# Patient Record
Sex: MALE | Race: WHITE | NOT HISPANIC OR LATINO | Employment: OTHER | URBAN - METROPOLITAN AREA
[De-identification: names, ages, dates, MRNs, and addresses within clinical notes are randomized per-mention and may not be internally consistent; named-entity substitution may affect disease eponyms.]

---

## 2018-04-21 ENCOUNTER — APPOINTMENT (OUTPATIENT)
Dept: CT IMAGING | Facility: HOSPITAL | Age: 43
End: 2018-04-21

## 2018-04-21 ENCOUNTER — HOSPITAL ENCOUNTER (EMERGENCY)
Facility: HOSPITAL | Age: 43
Discharge: HOME | End: 2018-04-21
Attending: EMERGENCY MEDICINE | Admitting: EMERGENCY MEDICINE

## 2018-04-21 VITALS
BODY MASS INDEX: 42.95 KG/M2 | HEIGHT: 69 IN | SYSTOLIC BLOOD PRESSURE: 134 MMHG | HEART RATE: 93 BPM | WEIGHT: 290 LBS | DIASTOLIC BLOOD PRESSURE: 86 MMHG | OXYGEN SATURATION: 92 % | TEMPERATURE: 98.2 F | RESPIRATION RATE: 14 BRPM

## 2018-04-21 DIAGNOSIS — N23 RENAL COLIC ON RIGHT SIDE: Primary | ICD-10-CM

## 2018-04-21 DIAGNOSIS — N20.1 URETEROLITHIASIS: ICD-10-CM

## 2018-04-21 LAB
ALBUMIN SERPL-MCNC: 4.2 G/DL (ref 3.2–4.8)
ALBUMIN/GLOB SERPL: 1.3 G/DL (ref 1.5–2.5)
ALP SERPL-CCNC: 80 U/L (ref 25–100)
ALT SERPL W P-5'-P-CCNC: 58 U/L (ref 7–40)
ANION GAP SERPL CALCULATED.3IONS-SCNC: 7 MMOL/L (ref 3–11)
AST SERPL-CCNC: 37 U/L (ref 0–33)
BACTERIA UR QL AUTO: ABNORMAL /HPF
BASOPHILS # BLD AUTO: 0.05 10*3/MM3 (ref 0–0.2)
BASOPHILS NFR BLD AUTO: 0.4 % (ref 0–1)
BILIRUB SERPL-MCNC: 0.5 MG/DL (ref 0.3–1.2)
BILIRUB UR QL STRIP: NEGATIVE
BUN BLD-MCNC: 11 MG/DL (ref 9–23)
BUN/CREAT SERPL: 12.2 (ref 7–25)
CALCIUM SPEC-SCNC: 9.2 MG/DL (ref 8.7–10.4)
CHLORIDE SERPL-SCNC: 103 MMOL/L (ref 99–109)
CLARITY UR: ABNORMAL
CO2 SERPL-SCNC: 29 MMOL/L (ref 20–31)
COLOR UR: YELLOW
CREAT BLD-MCNC: 0.9 MG/DL (ref 0.6–1.3)
DEPRECATED RDW RBC AUTO: 42.9 FL (ref 37–54)
EOSINOPHIL # BLD AUTO: 0.24 10*3/MM3 (ref 0–0.3)
EOSINOPHIL NFR BLD AUTO: 1.8 % (ref 0–3)
ERYTHROCYTE [DISTWIDTH] IN BLOOD BY AUTOMATED COUNT: 13.9 % (ref 11.3–14.5)
GFR SERPL CREATININE-BSD FRML MDRD: 91 ML/MIN/1.73
GLOBULIN UR ELPH-MCNC: 3.3 GM/DL
GLUCOSE BLD-MCNC: 123 MG/DL (ref 70–100)
GLUCOSE UR STRIP-MCNC: NEGATIVE MG/DL
HCT VFR BLD AUTO: 43.8 % (ref 38.9–50.9)
HGB BLD-MCNC: 14.2 G/DL (ref 13.1–17.5)
HGB UR QL STRIP.AUTO: ABNORMAL
HOLD SPECIMEN: NORMAL
HOLD SPECIMEN: NORMAL
HYALINE CASTS UR QL AUTO: ABNORMAL /LPF
IMM GRANULOCYTES # BLD: 0.04 10*3/MM3 (ref 0–0.03)
IMM GRANULOCYTES NFR BLD: 0.3 % (ref 0–0.6)
KETONES UR QL STRIP: NEGATIVE
LEUKOCYTE ESTERASE UR QL STRIP.AUTO: NEGATIVE
LIPASE SERPL-CCNC: 35 U/L (ref 6–51)
LYMPHOCYTES # BLD AUTO: 2.3 10*3/MM3 (ref 0.6–4.8)
LYMPHOCYTES NFR BLD AUTO: 17.5 % (ref 24–44)
MCH RBC QN AUTO: 27.7 PG (ref 27–31)
MCHC RBC AUTO-ENTMCNC: 32.4 G/DL (ref 32–36)
MCV RBC AUTO: 85.5 FL (ref 80–99)
MONOCYTES # BLD AUTO: 0.59 10*3/MM3 (ref 0–1)
MONOCYTES NFR BLD AUTO: 4.5 % (ref 0–12)
MUCOUS THREADS URNS QL MICRO: ABNORMAL /HPF
NEUTROPHILS # BLD AUTO: 9.93 10*3/MM3 (ref 1.5–8.3)
NEUTROPHILS NFR BLD AUTO: 75.8 % (ref 41–71)
NITRITE UR QL STRIP: NEGATIVE
PH UR STRIP.AUTO: <=5 [PH] (ref 5–8)
PLATELET # BLD AUTO: 273 10*3/MM3 (ref 150–450)
PMV BLD AUTO: 9.3 FL (ref 6–12)
POTASSIUM BLD-SCNC: 4 MMOL/L (ref 3.5–5.5)
PROT SERPL-MCNC: 7.5 G/DL (ref 5.7–8.2)
PROT UR QL STRIP: NEGATIVE
RBC # BLD AUTO: 5.12 10*6/MM3 (ref 4.2–5.76)
RBC # UR: ABNORMAL /HPF
REF LAB TEST METHOD: ABNORMAL
SODIUM BLD-SCNC: 139 MMOL/L (ref 132–146)
SP GR UR STRIP: 1.02 (ref 1–1.03)
SQUAMOUS #/AREA URNS HPF: ABNORMAL /HPF
UROBILINOGEN UR QL STRIP: ABNORMAL
WBC NRBC COR # BLD: 13.11 10*3/MM3 (ref 3.5–10.8)
WBC UR QL AUTO: ABNORMAL /HPF
WHOLE BLOOD HOLD SPECIMEN: NORMAL
WHOLE BLOOD HOLD SPECIMEN: NORMAL

## 2018-04-21 PROCEDURE — 80053 COMPREHEN METABOLIC PANEL: CPT | Performed by: EMERGENCY MEDICINE

## 2018-04-21 PROCEDURE — 36415 COLL VENOUS BLD VENIPUNCTURE: CPT

## 2018-04-21 PROCEDURE — 96374 THER/PROPH/DIAG INJ IV PUSH: CPT

## 2018-04-21 PROCEDURE — 74176 CT ABD & PELVIS W/O CONTRAST: CPT

## 2018-04-21 PROCEDURE — 99284 EMERGENCY DEPT VISIT MOD MDM: CPT

## 2018-04-21 PROCEDURE — 81001 URINALYSIS AUTO W/SCOPE: CPT | Performed by: EMERGENCY MEDICINE

## 2018-04-21 PROCEDURE — 25010000002 KETOROLAC TROMETHAMINE PER 15 MG: Performed by: EMERGENCY MEDICINE

## 2018-04-21 PROCEDURE — 85025 COMPLETE CBC W/AUTO DIFF WBC: CPT | Performed by: EMERGENCY MEDICINE

## 2018-04-21 PROCEDURE — 83690 ASSAY OF LIPASE: CPT | Performed by: EMERGENCY MEDICINE

## 2018-04-21 RX ORDER — KETOROLAC TROMETHAMINE 10 MG/1
10 TABLET, FILM COATED ORAL EVERY 6 HOURS PRN
Qty: 16 TABLET | Refills: 0 | OUTPATIENT
Start: 2018-04-21 | End: 2019-11-03

## 2018-04-21 RX ORDER — LISINOPRIL 20 MG/1
10 TABLET ORAL DAILY
COMMUNITY
End: 2019-03-23 | Stop reason: SDUPTHER

## 2018-04-21 RX ORDER — SODIUM CHLORIDE 0.9 % (FLUSH) 0.9 %
10 SYRINGE (ML) INJECTION AS NEEDED
Status: DISCONTINUED | OUTPATIENT
Start: 2018-04-21 | End: 2018-04-21 | Stop reason: HOSPADM

## 2018-04-21 RX ORDER — KETOROLAC TROMETHAMINE 15 MG/ML
15 INJECTION, SOLUTION INTRAMUSCULAR; INTRAVENOUS ONCE
Status: COMPLETED | OUTPATIENT
Start: 2018-04-21 | End: 2018-04-21

## 2018-04-21 RX ORDER — TAMSULOSIN HYDROCHLORIDE 0.4 MG/1
1 CAPSULE ORAL NIGHTLY
Qty: 15 CAPSULE | Refills: 0 | Status: SHIPPED | OUTPATIENT
Start: 2018-04-21 | End: 2019-03-23 | Stop reason: SDUPTHER

## 2018-04-21 RX ORDER — OXYCODONE AND ACETAMINOPHEN 7.5; 325 MG/1; MG/1
1 TABLET ORAL EVERY 4 HOURS PRN
Qty: 12 TABLET | Refills: 0 | OUTPATIENT
Start: 2018-04-21 | End: 2019-11-03

## 2018-04-21 RX ORDER — OMEPRAZOLE 20 MG/1
20 CAPSULE, DELAYED RELEASE ORAL DAILY
COMMUNITY
End: 2019-04-11 | Stop reason: SDUPTHER

## 2018-04-21 RX ADMIN — KETOROLAC TROMETHAMINE 15 MG: 15 INJECTION, SOLUTION INTRAMUSCULAR; INTRAVENOUS at 14:06

## 2018-04-21 RX ADMIN — SODIUM CHLORIDE 1000 ML: 9 INJECTION, SOLUTION INTRAVENOUS at 14:07

## 2018-04-21 NOTE — ED PROVIDER NOTES
Subjective   Mr. Sumanth Alfaro is a 43 y.o. male who presents to the ED by EMS with c/o abdominal pain onset approximately 45 minutes ago. Pt reports at initial onset he had sudden R flank pain that quickly radiates to suprapubic region. He states he has hx of kidney stones and experienced similar sx at last kidney stone episode, however current sx are more severe. He also complains of nausea but denies vomiting, testicular pain, penile pain, and any other acute sx at this time. He was given 4 mg of Zofran en route, which did not provide relief. Pt has surgical hx of Cholecystomy.                   History provided by:  Patient  Abdominal Pain   Pain location:  R flank  Pain radiates to:  Suprapubic region  Pain severity:  Severe (9/10)  Onset quality:  Sudden  Duration:  1 hour  Timing:  Constant  Progression:  Unchanged  Chronicity:  New  Relieved by:  Nothing  Worsened by:  Nothing  Ineffective treatments: Zofran.  Associated symptoms: nausea    Associated symptoms: no vomiting        Review of Systems   Gastrointestinal: Positive for abdominal pain (suprapubic region) and nausea. Negative for vomiting.   Genitourinary: Positive for flank pain (R flank). Negative for penile pain and testicular pain.   All other systems reviewed and are negative.      Past Medical History:   Diagnosis Date   • GERD (gastroesophageal reflux disease)    • Hypertension    • Kidney stone        Allergies   Allergen Reactions   • Sulfa Antibiotics Rash       Past Surgical History:   Procedure Laterality Date   • CARPAL TUNNEL RELEASE     • CHOLECYSTECTOMY         History reviewed. No pertinent family history.    Social History     Social History   • Marital status:      Social History Main Topics   • Smoking status: Never Smoker   • Alcohol use No   • Drug use: No   • Sexual activity: Defer     Other Topics Concern   • Not on file         Objective   Physical Exam   Constitutional: He is oriented to person, place, and time.  He appears well-developed and well-nourished. He appears distressed.   Pt appears in moderate distress secondary to abd pain.     No cva tenderness      HENT:   Head: Normocephalic and atraumatic.   Right Ear: External ear normal.   Left Ear: External ear normal.   Eyes: Conjunctivae are normal.   Neck: Normal range of motion.   Cardiovascular: Normal rate, regular rhythm and normal heart sounds.  Exam reveals no gallop and no friction rub.    No murmur heard.  Pulmonary/Chest: Effort normal and breath sounds normal. He has no wheezes. He has no rhonchi. He has no rales.   Abdominal: Soft. Bowel sounds are normal. There is no tenderness. There is no CVA tenderness.   Protuberantly adipose.    Musculoskeletal: Normal range of motion.   Neurological: He is alert and oriented to person, place, and time.   Skin: Skin is warm and dry. He is not diaphoretic.   Psychiatric: He has a normal mood and affect. His behavior is normal. Judgment and thought content normal.   Nursing note and vitals reviewed.      Procedures         ED Course  ED Course   Comment By Time   Patient much improved after Toradol and IV fluids.  Awaiting CT scan. Jose Fraire MD 04/21 4388       No results found for this or any previous visit (from the past 24 hour(s)).  Note: In addition to lab results from this visit, the labs listed above may include labs taken at another facility or during a different encounter within the last 24 hours. Please correlate lab times with ED admission and discharge times for further clarification of the services performed during this visit.    CT Abdomen Pelvis Without Contrast   Final Result   1. There is some stranding identified surrounding the right ureter which   is mildly dilated. Question of a 1 to 2 mm right distal ureteral stone.   Mild dilatation identified of the right renal collecting system with no   perinephric stranding.   2. Ventral abdominal wall hernias are identified: one is  umbilical and   the  other is periumbilical with both containing mesenteric fat.       DICTATED:     04/21/2018   EDITED/ls :     04/21/2018            This report was finalized on 4/21/2018 6:38 PM by Dr. Nani Mario MD.            Vitals:    04/21/18 1400 04/21/18 1438 04/21/18 1443 04/21/18 1653   BP:   138/80 134/86   BP Location:       Patient Position:       Pulse:    93   Resp:    14   Temp: 97.9 °F (36.6 °C)   98.2 °F (36.8 °C)   TempSrc: Oral   Oral   SpO2:  98% 92%    Weight:       Height:         Medications   sodium chloride 0.9 % bolus 1,000 mL (0 mL Intravenous Stopped 4/21/18 1654)   ketorolac (TORADOL) injection 15 mg (15 mg Intravenous Given 4/21/18 1406)     ECG/EMG Results (last 24 hours)     ** No results found for the last 24 hours. **                      [facility]    Final diagnoses:   Renal colic on right side   Ureterolithiasis       Documentation assistance provided by santos Joshi.  Information recorded by the scribe was done at my direction and has been verified and validated by me.     Finesse Joshi  04/21/18 5161       Jose Fraire MD  04/23/18 2407

## 2018-04-21 NOTE — DISCHARGE INSTRUCTIONS
If unable to fill the prescription for Toradol, please utilize ibuprofen 3-4 over-the-counter tablets every 8 hours as needed for discomfort.    For worsened pain you may use the oxycodone.  No driving within 12 hours of taking oxycodone.    Push fluids.  Strain all urine.    If the stone does not pass I suggest following up with the urologist.  You may follow-up with one per Dr. Carl's preference or with our on-call urologist, Dr. Kamara.    Return if worse or if fevers develop.    If you become quite lightheaded in the upright position please discontinue Flomax.    Please review the medications you are supposed to be taking according to prior physician recommendations. I have not changed your home medications during this visit. If your discharge instructions indicate that I have changed your home medications, this is not the case, and you should disregard. If you have any questions about the medication you should be taking at home, please call your physician.

## 2019-01-31 ENCOUNTER — OFFICE (OUTPATIENT)
Dept: URBAN - METROPOLITAN AREA PATHOLOGY 4 | Facility: PATHOLOGY | Age: 44
End: 2019-01-31

## 2019-01-31 ENCOUNTER — AMBULATORY SURGICAL CENTER (OUTPATIENT)
Dept: URBAN - METROPOLITAN AREA SURGERY 10 | Facility: SURGERY | Age: 44
End: 2019-01-31

## 2019-01-31 DIAGNOSIS — K64.1 SECOND DEGREE HEMORRHOIDS: ICD-10-CM

## 2019-01-31 DIAGNOSIS — D12.2 BENIGN NEOPLASM OF ASCENDING COLON: ICD-10-CM

## 2019-01-31 DIAGNOSIS — R10.30 LOWER ABDOMINAL PAIN, UNSPECIFIED: ICD-10-CM

## 2019-01-31 DIAGNOSIS — K62.5 HEMORRHAGE OF ANUS AND RECTUM: ICD-10-CM

## 2019-01-31 PROCEDURE — 45385 COLONOSCOPY W/LESION REMOVAL: CPT | Performed by: INTERNAL MEDICINE

## 2019-01-31 PROCEDURE — 45398 COLONOSCOPY W/BAND LIGATION: CPT | Performed by: INTERNAL MEDICINE

## 2019-01-31 PROCEDURE — 88305 TISSUE EXAM BY PATHOLOGIST: CPT | Performed by: INTERNAL MEDICINE

## 2019-03-07 RX ORDER — VENLAFAXINE HYDROCHLORIDE 75 MG/1
CAPSULE, EXTENDED RELEASE ORAL
Qty: 90 CAPSULE | Refills: 3 | Status: SHIPPED | OUTPATIENT
Start: 2019-03-07 | End: 2020-03-05

## 2019-03-25 RX ORDER — LISINOPRIL 20 MG/1
TABLET ORAL
Qty: 90 TABLET | Refills: 1 | Status: SHIPPED | OUTPATIENT
Start: 2019-03-25 | End: 2019-09-24 | Stop reason: SDUPTHER

## 2019-03-25 RX ORDER — TAMSULOSIN HYDROCHLORIDE 0.4 MG/1
CAPSULE ORAL
Qty: 90 CAPSULE | Refills: 1 | Status: SHIPPED | OUTPATIENT
Start: 2019-03-25 | End: 2019-09-24 | Stop reason: SDUPTHER

## 2019-04-12 RX ORDER — OMEPRAZOLE 20 MG/1
CAPSULE, DELAYED RELEASE ORAL
Qty: 90 CAPSULE | Refills: 1 | Status: SHIPPED | OUTPATIENT
Start: 2019-04-12 | End: 2019-10-14 | Stop reason: SDUPTHER

## 2019-04-12 NOTE — TELEPHONE ENCOUNTER
IN [facility] WE HAVE THIS MEDICATION OMEPRAZOLE 20 1 QD INACTIVE AS OF 10/26/18  AND HAVE [id number] MG 1 BID   LISTED IN PT'S ACTIVE MEDS ON 11/30/18    IN [facility] OMEPRAZOLE  WAS PRESCRIBED BY YANNI VENTURA ON 4/21/18        PLEASE ADVISE

## 2019-09-24 RX ORDER — LISINOPRIL 20 MG/1
TABLET ORAL
Qty: 30 TABLET | Refills: 1 | Status: SHIPPED | OUTPATIENT
Start: 2019-09-24 | End: 2019-11-22 | Stop reason: SDUPTHER

## 2019-09-24 RX ORDER — TAMSULOSIN HYDROCHLORIDE 0.4 MG/1
CAPSULE ORAL
Qty: 30 CAPSULE | Refills: 1 | Status: SHIPPED | OUTPATIENT
Start: 2019-09-24 | End: 2019-11-22 | Stop reason: SDUPTHER

## 2019-09-26 ENCOUNTER — TELEPHONE (OUTPATIENT)
Dept: INTERNAL MEDICINE | Facility: CLINIC | Age: 44
End: 2019-09-26

## 2019-09-26 NOTE — TELEPHONE ENCOUNTER
GLORIA FROM [facility]  CALLED STATING SHE NEEDS LAST OV NOTE AND LAST EKG IF WE HAVE IT FAXED TO HER AT  [phone]

## 2019-10-14 RX ORDER — OMEPRAZOLE 20 MG/1
CAPSULE, DELAYED RELEASE ORAL
Qty: 90 CAPSULE | Refills: 0 | Status: SHIPPED | OUTPATIENT
Start: 2019-10-14 | End: 2019-11-22 | Stop reason: SDUPTHER

## 2019-10-25 ENCOUNTER — TELEPHONE (OUTPATIENT)
Dept: FAMILY MEDICINE CLINIC | Facility: CLINIC | Age: 44
End: 2019-10-25

## 2019-10-25 RX ORDER — TAMSULOSIN HYDROCHLORIDE 0.4 MG/1
1 CAPSULE ORAL DAILY
Qty: 30 CAPSULE | Refills: 1 | Status: CANCELLED | OUTPATIENT
Start: 2019-10-25

## 2019-10-25 RX ORDER — LISINOPRIL 20 MG/1
20 TABLET ORAL DAILY
Qty: 30 TABLET | Refills: 1 | Status: CANCELLED | OUTPATIENT
Start: 2019-10-25

## 2019-11-03 ENCOUNTER — APPOINTMENT (OUTPATIENT)
Dept: CT IMAGING | Facility: HOSPITAL | Age: 44
End: 2019-11-03

## 2019-11-03 ENCOUNTER — HOSPITAL ENCOUNTER (EMERGENCY)
Facility: HOSPITAL | Age: 44
Discharge: HOME | End: 2019-11-03
Attending: EMERGENCY MEDICINE | Admitting: EMERGENCY MEDICINE

## 2019-11-03 VITALS
HEART RATE: 75 BPM | WEIGHT: 280 LBS | RESPIRATION RATE: 18 BRPM | SYSTOLIC BLOOD PRESSURE: 120 MMHG | BODY MASS INDEX: 41.47 KG/M2 | TEMPERATURE: 98.5 F | HEIGHT: 69 IN | OXYGEN SATURATION: 88 % | DIASTOLIC BLOOD PRESSURE: 84 MMHG

## 2019-11-03 DIAGNOSIS — N20.1 RIGHT URETERAL STONE: Primary | ICD-10-CM

## 2019-11-03 DIAGNOSIS — R10.9 ACUTE ABDOMINAL PAIN: ICD-10-CM

## 2019-11-03 DIAGNOSIS — N20.0 LEFT RENAL STONE: ICD-10-CM

## 2019-11-03 LAB
ALBUMIN SERPL-MCNC: 3.7 G/DL (ref 3.5–5.2)
ALBUMIN/GLOB SERPL: 1 G/DL
ALP SERPL-CCNC: 68 U/L (ref 39–117)
ALT SERPL W P-5'-P-CCNC: 48 U/L (ref 1–41)
ANION GAP SERPL CALCULATED.3IONS-SCNC: 9 MMOL/L (ref 5–15)
AST SERPL-CCNC: 32 U/L (ref 1–40)
BASOPHILS # BLD AUTO: 0.09 10*3/MM3 (ref 0–0.2)
BASOPHILS NFR BLD AUTO: 1 % (ref 0–1.5)
BILIRUB SERPL-MCNC: 0.4 MG/DL (ref 0.2–1.2)
BILIRUB UR QL STRIP: NEGATIVE
BUN BLD-MCNC: 11 MG/DL (ref 6–20)
BUN/CREAT SERPL: 11.1 (ref 7–25)
CALCIUM SPEC-SCNC: 9.2 MG/DL (ref 8.6–10.5)
CHLORIDE SERPL-SCNC: 102 MMOL/L (ref 98–107)
CLARITY UR: CLEAR
CO2 SERPL-SCNC: 28 MMOL/L (ref 22–29)
COLOR UR: YELLOW
CREAT BLD-MCNC: 0.99 MG/DL (ref 0.76–1.27)
DEPRECATED RDW RBC AUTO: 44.7 FL (ref 37–54)
EOSINOPHIL # BLD AUTO: 0.32 10*3/MM3 (ref 0–0.4)
EOSINOPHIL NFR BLD AUTO: 3.5 % (ref 0.3–6.2)
ERYTHROCYTE [DISTWIDTH] IN BLOOD BY AUTOMATED COUNT: 14 % (ref 12.3–15.4)
GFR SERPL CREATININE-BSD FRML MDRD: 81 ML/MIN/1.73
GLOBULIN UR ELPH-MCNC: 3.6 GM/DL
GLUCOSE BLD-MCNC: 96 MG/DL (ref 65–99)
GLUCOSE UR STRIP-MCNC: NEGATIVE MG/DL
HCT VFR BLD AUTO: 42.4 % (ref 37.5–51)
HGB BLD-MCNC: 13.3 G/DL (ref 13–17.7)
HGB UR QL STRIP.AUTO: NEGATIVE
HOLD SPECIMEN: NORMAL
HOLD SPECIMEN: NORMAL
IMM GRANULOCYTES # BLD AUTO: 0.03 10*3/MM3 (ref 0–0.05)
IMM GRANULOCYTES NFR BLD AUTO: 0.3 % (ref 0–0.5)
KETONES UR QL STRIP: NEGATIVE
LEUKOCYTE ESTERASE UR QL STRIP.AUTO: NEGATIVE
LIPASE SERPL-CCNC: 67 U/L (ref 13–60)
LYMPHOCYTES # BLD AUTO: 3.76 10*3/MM3 (ref 0.7–3.1)
LYMPHOCYTES NFR BLD AUTO: 41.6 % (ref 19.6–45.3)
MCH RBC QN AUTO: 27.3 PG (ref 26.6–33)
MCHC RBC AUTO-ENTMCNC: 31.4 G/DL (ref 31.5–35.7)
MCV RBC AUTO: 86.9 FL (ref 79–97)
MONOCYTES # BLD AUTO: 0.68 10*3/MM3 (ref 0.1–0.9)
MONOCYTES NFR BLD AUTO: 7.5 % (ref 5–12)
NEUTROPHILS # BLD AUTO: 4.16 10*3/MM3 (ref 1.7–7)
NEUTROPHILS NFR BLD AUTO: 46.1 % (ref 42.7–76)
NITRITE UR QL STRIP: NEGATIVE
NRBC BLD AUTO-RTO: 0 /100 WBC (ref 0–0.2)
PH UR STRIP.AUTO: <=5 [PH] (ref 5–8)
PLATELET # BLD AUTO: 310 10*3/MM3 (ref 140–450)
PMV BLD AUTO: 8.8 FL (ref 6–12)
POTASSIUM BLD-SCNC: 4.1 MMOL/L (ref 3.5–5.2)
PROT SERPL-MCNC: 7.3 G/DL (ref 6–8.5)
PROT UR QL STRIP: NEGATIVE
RBC # BLD AUTO: 4.88 10*6/MM3 (ref 4.14–5.8)
SODIUM BLD-SCNC: 139 MMOL/L (ref 136–145)
SP GR UR STRIP: 1.03 (ref 1–1.03)
UROBILINOGEN UR QL STRIP: NORMAL
WBC NRBC COR # BLD: 9.04 10*3/MM3 (ref 3.4–10.8)
WHOLE BLOOD HOLD SPECIMEN: NORMAL
WHOLE BLOOD HOLD SPECIMEN: NORMAL

## 2019-11-03 PROCEDURE — 25010000002 KETOROLAC TROMETHAMINE PER 15 MG: Performed by: EMERGENCY MEDICINE

## 2019-11-03 PROCEDURE — 80053 COMPREHEN METABOLIC PANEL: CPT | Performed by: EMERGENCY MEDICINE

## 2019-11-03 PROCEDURE — 25010000002 HYDROMORPHONE 1 MG/ML SOLUTION: Performed by: EMERGENCY MEDICINE

## 2019-11-03 PROCEDURE — 96375 TX/PRO/DX INJ NEW DRUG ADDON: CPT

## 2019-11-03 PROCEDURE — 74176 CT ABD & PELVIS W/O CONTRAST: CPT

## 2019-11-03 PROCEDURE — 99283 EMERGENCY DEPT VISIT LOW MDM: CPT

## 2019-11-03 PROCEDURE — 85025 COMPLETE CBC W/AUTO DIFF WBC: CPT | Performed by: EMERGENCY MEDICINE

## 2019-11-03 PROCEDURE — 96374 THER/PROPH/DIAG INJ IV PUSH: CPT

## 2019-11-03 PROCEDURE — 81003 URINALYSIS AUTO W/O SCOPE: CPT | Performed by: EMERGENCY MEDICINE

## 2019-11-03 PROCEDURE — 25010000002 ONDANSETRON PER 1 MG: Performed by: EMERGENCY MEDICINE

## 2019-11-03 PROCEDURE — 83690 ASSAY OF LIPASE: CPT | Performed by: EMERGENCY MEDICINE

## 2019-11-03 RX ORDER — KETOROLAC TROMETHAMINE 15 MG/ML
15 INJECTION, SOLUTION INTRAMUSCULAR; INTRAVENOUS ONCE
Status: COMPLETED | OUTPATIENT
Start: 2019-11-03 | End: 2019-11-03

## 2019-11-03 RX ORDER — ONDANSETRON 2 MG/ML
4 INJECTION INTRAMUSCULAR; INTRAVENOUS ONCE
Status: COMPLETED | OUTPATIENT
Start: 2019-11-03 | End: 2019-11-03

## 2019-11-03 RX ORDER — SODIUM CHLORIDE 0.9 % (FLUSH) 0.9 %
10 SYRINGE (ML) INJECTION AS NEEDED
Status: DISCONTINUED | OUTPATIENT
Start: 2019-11-03 | End: 2019-11-03 | Stop reason: HOSPADM

## 2019-11-03 RX ORDER — ONDANSETRON 4 MG/1
4 TABLET, ORALLY DISINTEGRATING ORAL EVERY 8 HOURS PRN
Qty: 6 TABLET | Refills: 0 | Status: SHIPPED | OUTPATIENT
Start: 2019-11-03 | End: 2019-11-11

## 2019-11-03 RX ORDER — HYDROMORPHONE HYDROCHLORIDE 2 MG/1
2 TABLET ORAL EVERY 4 HOURS PRN
Qty: 12 TABLET | Refills: 0 | Status: SHIPPED | OUTPATIENT
Start: 2019-11-03 | End: 2019-11-11

## 2019-11-03 RX ADMIN — HYDROMORPHONE HYDROCHLORIDE 1 MG: 1 INJECTION, SOLUTION INTRAMUSCULAR; INTRAVENOUS; SUBCUTANEOUS at 09:55

## 2019-11-03 RX ADMIN — ONDANSETRON 4 MG: 2 INJECTION INTRAMUSCULAR; INTRAVENOUS at 09:54

## 2019-11-03 RX ADMIN — SODIUM CHLORIDE 1000 ML: 9 INJECTION, SOLUTION INTRAVENOUS at 09:53

## 2019-11-03 RX ADMIN — KETOROLAC TROMETHAMINE 15 MG: 15 INJECTION, SOLUTION INTRAMUSCULAR; INTRAVENOUS at 11:17

## 2019-11-03 NOTE — ED PROVIDER NOTES
Subjective   Sumanth Alfaro is a 44 y.o. male who presents to the ED with c/o abdominal pain. Mr. Alfaro states his sharp, intermittent right lower quadrant abdominal pain began 2 days ago and it radiates into his right groin. When the pain comes on it normally lasts for a few hours and there is nothing that exacerbates or alleviates the pain. Mr. Alfaro reports he has a history of kidney stones and this current episodes feels similar to the past one. He complains of nausea, decreased appetite during the onset of his pain, and difficulty urinating yesterday but denies vomiting, fever, dysuria, urgency, and hematuria. Mr. Alfaro reports he has not had a bowel movement within the past few days. He has a past medical history of GERD and his surgical history includes cholecystectomy. There are no other acute complaints at this time.        History provided by:  Patient  Abdominal Pain   Pain location:  RLQ  Pain quality: sharp    Pain radiates to:  Groin  Pain severity:  Moderate  Onset quality:  Sudden  Duration:  2 days  Timing:  Intermittent  Progression:  Worsening  Chronicity:  Recurrent  Relieved by:  None tried  Worsened by:  Nothing  Ineffective treatments:  None tried  Associated symptoms: constipation (no bowel movement over the past few days) and nausea    Associated symptoms: no anorexia, no chills, no diarrhea, no dysuria, no fever, no hematuria, no vaginal bleeding, no vaginal discharge and no vomiting    Risk factors: no alcohol abuse, not elderly and not pregnant        Review of Systems   Constitutional: Positive for appetite change (decreased when pain comes on). Negative for chills and fever.   Gastrointestinal: Positive for abdominal pain, constipation (no bowel movement over the past few days) and nausea. Negative for anorexia, blood in stool, diarrhea and vomiting.   Genitourinary: Positive for difficulty urinating (yesterday). Negative for decreased urine volume, dysuria,  frequency, hematuria, urgency, vaginal bleeding and vaginal discharge.   Musculoskeletal:        Groin pain that radiates from the right lower quadrant.   All other systems reviewed and are negative.      Past Medical History:   Diagnosis Date   • GERD (gastroesophageal reflux disease)    • Hypertension    • Kidney stone        Allergies   Allergen Reactions   • Sulfa Antibiotics Rash       Past Surgical History:   Procedure Laterality Date   • CARPAL TUNNEL RELEASE     • CHOLECYSTECTOMY         No family history on file.    Social History     Socioeconomic History   • Marital status:      Spouse name: Not on file   • Number of children: Not on file   • Years of education: Not on file   • Highest education level: Not on file   Tobacco Use   • Smoking status: Never Smoker   Substance and Sexual Activity   • Alcohol use: No   • Drug use: No   • Sexual activity: Defer         Objective   Physical Exam   Constitutional: He is oriented to person, place, and time. He appears well-developed and well-nourished. No distress.   HENT:   Head: Normocephalic and atraumatic.   Eyes: Conjunctivae are normal. No scleral icterus.   Neck: Normal range of motion. Neck supple.   Cardiovascular: Normal rate, regular rhythm and normal heart sounds.   No murmur heard.  Pulmonary/Chest: Effort normal and breath sounds normal. No respiratory distress. He has no wheezes. He has no rhonchi. He has no rales.   Abdominal: Soft. There is tenderness in the right lower quadrant.   Musculoskeletal: Normal range of motion. He exhibits no edema.   Neurological: He is alert and oriented to person, place, and time.   Skin: Skin is warm and dry.   Psychiatric: He has a normal mood and affect. His behavior is normal.   Nursing note and vitals reviewed.      Procedures         ED Course  ED Course as of Nov 03 1332   Sun Nov 03, 2019   1114 Dr. Sims is bedside re-evaluating Mr. Alfaro and updating him on the results of the studies.  [BS]    1122 I spoke with Mr. Alfaro and his wife about CT findings.  He tells me he feels better.  I discussed indicators for return and use of narcotic pain medicines, I gave him a strainer.  [DT]   1130 Dr. Sims is bedside discussing the plan for discharge with Mr. Alfaro.  [BS]      ED Course User Index  [BS] Sammy Erickson  [DT] Anatoly Sims MD                     MDM  Number of Diagnoses or Management Options  Acute abdominal pain: new and requires workup  Left renal stone:   Right ureteral stone: new and requires workup     Amount and/or Complexity of Data Reviewed  Clinical lab tests: reviewed and ordered  Tests in the radiology section of CPT®: ordered and reviewed  Review and summarize past medical records: yes    Patient Progress  Patient progress: improved      Final diagnoses:   Right ureteral stone   Left renal stone   Acute abdominal pain       Documentation assistance provided by santos Erickson.  Information recorded by the scribe was done at my direction and has been verified and validated by me.     Sammy Erickson  11/03/19 0951       Anatoly Sims MD  11/03/19 4520

## 2019-11-03 NOTE — DISCHARGE INSTRUCTIONS
Drink plenty of fluids.  Return if your pain is not adequately controlled, if you have fever, if you are vomiting and cannot hold down your medications, or if you have any other concerns.  Do not drive while taking the pain medicine I have prescribed.

## 2019-11-08 PROBLEM — F32.A DEPRESSION: Status: ACTIVE | Noted: 2019-11-08

## 2019-11-08 PROBLEM — N40.1 BENIGN PROSTATIC HYPERPLASIA (BPH) WITH URINARY URGENCY: Status: ACTIVE | Noted: 2019-11-08

## 2019-11-08 PROBLEM — J30.9 ALLERGIC RHINITIS: Status: ACTIVE | Noted: 2019-11-08

## 2019-11-08 PROBLEM — N20.0 RENAL STONE: Status: ACTIVE | Noted: 2019-11-08

## 2019-11-08 PROBLEM — J01.90 ACUTE BACTERIAL RHINOSINUSITIS: Status: ACTIVE | Noted: 2019-11-08

## 2019-11-08 PROBLEM — E78.5 HYPERLIPIDEMIA LDL GOAL <100: Status: ACTIVE | Noted: 2019-11-08

## 2019-11-08 PROBLEM — K21.00 ESOPHAGITIS, REFLUX: Status: ACTIVE | Noted: 2019-11-08

## 2019-11-08 PROBLEM — B96.89 ACUTE BACTERIAL RHINOSINUSITIS: Status: ACTIVE | Noted: 2019-11-08

## 2019-11-08 PROBLEM — I10 HYPERTENSION, BENIGN: Status: ACTIVE | Noted: 2019-11-08

## 2019-11-08 PROBLEM — R39.15 BENIGN PROSTATIC HYPERPLASIA (BPH) WITH URINARY URGENCY: Status: ACTIVE | Noted: 2019-11-08

## 2019-11-08 PROBLEM — R73.01 IMPAIRED FASTING GLUCOSE: Status: ACTIVE | Noted: 2019-11-08

## 2019-11-08 PROBLEM — R30.0 DYSURIA: Status: ACTIVE | Noted: 2019-11-08

## 2019-11-08 PROBLEM — E66.01 MORBID OBESITY DUE TO EXCESS CALORIES (HCC): Status: ACTIVE | Noted: 2019-11-08

## 2019-11-08 PROBLEM — M54.50 PAIN IN LOWER BACK: Status: ACTIVE | Noted: 2019-11-08

## 2019-11-11 ENCOUNTER — OFFICE VISIT (OUTPATIENT)
Dept: INTERNAL MEDICINE | Facility: CLINIC | Age: 44
End: 2019-11-11

## 2019-11-11 VITALS
TEMPERATURE: 98.2 F | HEIGHT: 69 IN | HEART RATE: 100 BPM | BODY MASS INDEX: 43.84 KG/M2 | DIASTOLIC BLOOD PRESSURE: 64 MMHG | WEIGHT: 296 LBS | SYSTOLIC BLOOD PRESSURE: 118 MMHG

## 2019-11-11 DIAGNOSIS — N20.0 KIDNEY STONES: ICD-10-CM

## 2019-11-11 DIAGNOSIS — R35.0 URINARY FREQUENCY: Primary | ICD-10-CM

## 2019-11-11 LAB
BILIRUB BLD-MCNC: NEGATIVE MG/DL
CLARITY, POC: CLEAR
COLOR UR: ABNORMAL
GLUCOSE UR STRIP-MCNC: NEGATIVE MG/DL
KETONES UR QL: NEGATIVE
LEUKOCYTE EST, POC: NEGATIVE
NITRITE UR-MCNC: NEGATIVE MG/ML
PH UR: 6 [PH] (ref 5–8)
PROT UR STRIP-MCNC: NEGATIVE MG/DL
RBC # UR STRIP: NEGATIVE /UL
SP GR UR: 1.02 (ref 1–1.03)
UROBILINOGEN UR QL: NORMAL

## 2019-11-11 PROCEDURE — 99213 OFFICE O/P EST LOW 20 MIN: CPT | Performed by: NURSE PRACTITIONER

## 2019-11-11 PROCEDURE — 81003 URINALYSIS AUTO W/O SCOPE: CPT | Performed by: NURSE PRACTITIONER

## 2019-11-11 RX ORDER — LEVETIRACETAM 500 MG/1
500 TABLET ORAL DAILY
COMMUNITY
End: 2022-04-15

## 2019-11-11 NOTE — PROGRESS NOTES
Sumanth Alfaro  1975  [id number]  Patient Care Team:  Austyn Carl MD as PCP - General (Internal Medicine)    Sumanth Alfaro is a pleasant 44 y.o. male who presents for evaluation of Flank Pain (x 2 weeks )    Chief Complaint   Patient presents with   • Flank Pain     x 2 weeks        HPI:   Having to need urinary frequency, recent ED visit with 3 stones identified, does not feel complete emptying after urination, some dysuria.  No nausea, vomting fever.  No flank pain, has used strainer but not seen any stones  On flomax BPH since he ED visit.  He is not needing anything for pain at the moment.  Seeing wendy but appointment not until December.  Past Medical History:   Diagnosis Date   • Abdominal pain    • Acute cholecystitis    • Carpal tunnel syndrome    • GERD (gastroesophageal reflux disease)    • Headache     Dr Clement for headaches-referred to ENT   • Hypertension    • Kidney stone    • Left shoulder pain    • Nephrolithiasis    • Sphincter of Oddi dysfunction      Past Surgical History:   Procedure Laterality Date   • CARPAL TUNNEL RELEASE Bilateral 2003   • CHOLECYSTECTOMY  2010    laparoscopic-per DR Adams    • COLONOSCOPY  11/2009    mild ulcer DR Hale   • SHOULDER ARTHROSCOPY  2009    DR Montana@   • SINUS SURGERY  2018    Dr Salmon did sinus procedure cleaning out sinuses     Family History   Problem Relation Age of Onset   • Obesity Father    • Coronary artery disease Father    • Hypertension Father    • Diabetes Father    • Hyperlipidemia Father    • Cancer Other         barthrolin gland   • Obesity Other      Social History     Tobacco Use   Smoking Status Never Smoker   Smokeless Tobacco Never Used     Allergies   Allergen Reactions   • Sulfa Antibiotics Rash       Current Outpatient Medications:   •  levETIRAcetam (KEPPRA) 500 MG tablet, Take 500 mg by mouth Daily., Disp: , Rfl:   •  lisinopril (PRINIVIL,ZESTRIL) 20 MG tablet, TAKE 1 TABLET BY MOUTH ONCE DAILY,  "Disp: 30 tablet, Rfl: 1  •  omeprazole (priLOSEC) 20 MG capsule, TAKE 1 CAPSULE BY MOUTH ONCE DAILY, Disp: 90 capsule, Rfl: 0  •  tamsulosin (FLOMAX) 0.4 MG capsule 24 hr capsule, TAKE 1 CAPSULE BY MOUTH ONCE DAILY, 1/2 HOUR FOLLOWING THE SAME MEAL EACH DAY, Disp: 30 capsule, Rfl: 1  •  venlafaxine XR (EFFEXOR-XR) 75 MG 24 hr capsule, TAKE 1 CAPSULE BY MOUTH ONCE DAILY WITH FOOD, Disp: 90 capsule, Rfl: 3    Review of Systems   Constitutional: Negative for chills, fatigue and fever.   HENT: Negative for congestion, ear pain and sinus pressure.    Respiratory: Negative for cough, chest tightness, shortness of breath and wheezing.    Cardiovascular: Negative for chest pain and palpitations.   Gastrointestinal: Positive for abdominal pain. Negative for blood in stool and constipation.   Skin: Negative for color change.   Allergic/Immunologic: Negative for environmental allergies.   Neurological: Positive for headache. Negative for dizziness and speech difficulty.   Psychiatric/Behavioral: Negative for decreased concentration. The patient is not nervous/anxious.      /64 (BP Location: Left arm, Patient Position: Sitting, Cuff Size: Large Adult)   Pulse 100   Temp 98.2 °F (36.8 °C) (Temporal)   Ht 175.3 cm (69.02\")   Wt 134 kg (296 lb)   BMI 43.69 kg/m²     Physical Exam   Constitutional: He appears well-developed and well-nourished. He is morbidly obese.  HENT:   Head: Normocephalic and atraumatic.   Right Ear: External ear normal.   Left Ear: External ear normal.   Mouth/Throat: Oropharynx is clear and moist.   Eyes: Conjunctivae and EOM are normal.   Neck: Normal range of motion. Neck supple.   Cardiovascular: Normal rate, regular rhythm and normal heart sounds.   Pulmonary/Chest: Effort normal and breath sounds normal.   Abdominal: Soft. Bowel sounds are normal.   Musculoskeletal: Normal range of motion.   Neurological: He is alert.   Skin: Skin is warm and dry.   Psychiatric: He has a normal mood and " affect. His behavior is normal. Thought content normal.       Results Review:  I reviewed the patient's new clinical results.    Assessment/Plan:  Sumanth was seen today for flank pain.    Diagnoses and all orders for this visit:    Urinary frequency  -     POC Urinalysis Dipstick, Automated    Kidney stones       Patient Instructions   Urine looks normal in office today.  Return if you develop severe abdominal pain, fever, nausea, vomiting.  I would suggest calling Dr. Lai's office to see if they can put you on a cancellation list or have an earlier appointment with a different provider.  Continue Flomax.    Plan of care reviewed with patient at the conclusion of today's visit. Education was provided regarding diagnosis, management and any prescribed or recommended OTC medications.  Patient verbalizes understanding of and agreement with management plan.    Return if symptoms worsen or fail to improve.    *Note that portions of this note were completed with a voice recognition program.  Efforts were made to edit the dictation but occasionally words are transcribed.    CARMEL Jama

## 2019-11-13 NOTE — PATIENT INSTRUCTIONS
Urine looks normal in office today.  Return if you develop severe abdominal pain, fever, nausea, vomiting.  I would suggest calling Dr. Lai's office to see if they can put you on a cancellation list or have an earlier appointment with a different provider.  Continue Flomax.

## 2019-11-22 ENCOUNTER — OFFICE VISIT (OUTPATIENT)
Dept: INTERNAL MEDICINE | Facility: CLINIC | Age: 44
End: 2019-11-22

## 2019-11-22 ENCOUNTER — LAB (OUTPATIENT)
Dept: LAB | Facility: HOSPITAL | Age: 44
End: 2019-11-22

## 2019-11-22 VITALS
HEART RATE: 74 BPM | WEIGHT: 297 LBS | HEIGHT: 69 IN | DIASTOLIC BLOOD PRESSURE: 82 MMHG | TEMPERATURE: 97.1 F | SYSTOLIC BLOOD PRESSURE: 114 MMHG | BODY MASS INDEX: 43.99 KG/M2

## 2019-11-22 DIAGNOSIS — N20.0 RENAL STONE: ICD-10-CM

## 2019-11-22 DIAGNOSIS — I10 HYPERTENSION, BENIGN: ICD-10-CM

## 2019-11-22 DIAGNOSIS — R73.01 IMPAIRED FASTING GLUCOSE: ICD-10-CM

## 2019-11-22 DIAGNOSIS — Z12.5 SCREENING PSA (PROSTATE SPECIFIC ANTIGEN): ICD-10-CM

## 2019-11-22 DIAGNOSIS — E78.5 HYPERLIPIDEMIA LDL GOAL <100: ICD-10-CM

## 2019-11-22 DIAGNOSIS — Z00.00 PREVENTATIVE HEALTH CARE: Primary | ICD-10-CM

## 2019-11-22 DIAGNOSIS — R21 RASH: ICD-10-CM

## 2019-11-22 DIAGNOSIS — E66.01 MORBID OBESITY DUE TO EXCESS CALORIES (HCC): ICD-10-CM

## 2019-11-22 LAB
ALBUMIN SERPL-MCNC: 4.3 G/DL (ref 3.5–5.2)
ALBUMIN/GLOB SERPL: 1.2 G/DL
ALP SERPL-CCNC: 68 U/L (ref 39–117)
ALT SERPL W P-5'-P-CCNC: 53 U/L (ref 1–41)
ANION GAP SERPL CALCULATED.3IONS-SCNC: 11.7 MMOL/L (ref 5–15)
AST SERPL-CCNC: 33 U/L (ref 1–40)
BASOPHILS # BLD AUTO: 0.12 10*3/MM3 (ref 0–0.2)
BASOPHILS NFR BLD AUTO: 1.1 % (ref 0–1.5)
BILIRUB SERPL-MCNC: 0.5 MG/DL (ref 0.2–1.2)
BUN BLD-MCNC: 11 MG/DL (ref 6–20)
BUN/CREAT SERPL: 12.4 (ref 7–25)
CALCIUM SPEC-SCNC: 9.6 MG/DL (ref 8.6–10.5)
CHLORIDE SERPL-SCNC: 103 MMOL/L (ref 98–107)
CHOLEST SERPL-MCNC: 157 MG/DL (ref 0–200)
CO2 SERPL-SCNC: 25.3 MMOL/L (ref 22–29)
CREAT BLD-MCNC: 0.89 MG/DL (ref 0.76–1.27)
DEPRECATED RDW RBC AUTO: 41.9 FL (ref 37–54)
EOSINOPHIL # BLD AUTO: 0.27 10*3/MM3 (ref 0–0.4)
EOSINOPHIL NFR BLD AUTO: 2.6 % (ref 0.3–6.2)
ERYTHROCYTE [DISTWIDTH] IN BLOOD BY AUTOMATED COUNT: 13.9 % (ref 12.3–15.4)
GFR SERPL CREATININE-BSD FRML MDRD: 92 ML/MIN/1.73
GLOBULIN UR ELPH-MCNC: 3.6 GM/DL
GLUCOSE BLD-MCNC: 98 MG/DL (ref 65–99)
HBA1C MFR BLD: 6.3 % (ref 4.8–5.6)
HCT VFR BLD AUTO: 41.1 % (ref 37.5–51)
HDLC SERPL-MCNC: 38 MG/DL (ref 40–60)
HGB BLD-MCNC: 13.8 G/DL (ref 13–17.7)
IMM GRANULOCYTES # BLD AUTO: 0.04 10*3/MM3 (ref 0–0.05)
IMM GRANULOCYTES NFR BLD AUTO: 0.4 % (ref 0–0.5)
LDLC SERPL CALC-MCNC: 94 MG/DL (ref 0–100)
LDLC/HDLC SERPL: 2.47 {RATIO}
LYMPHOCYTES # BLD AUTO: 4.08 10*3/MM3 (ref 0.7–3.1)
LYMPHOCYTES NFR BLD AUTO: 39 % (ref 19.6–45.3)
MCH RBC QN AUTO: 28 PG (ref 26.6–33)
MCHC RBC AUTO-ENTMCNC: 33.6 G/DL (ref 31.5–35.7)
MCV RBC AUTO: 83.4 FL (ref 79–97)
MONOCYTES # BLD AUTO: 0.69 10*3/MM3 (ref 0.1–0.9)
MONOCYTES NFR BLD AUTO: 6.6 % (ref 5–12)
NEUTROPHILS # BLD AUTO: 5.25 10*3/MM3 (ref 1.7–7)
NEUTROPHILS NFR BLD AUTO: 50.3 % (ref 42.7–76)
NRBC BLD AUTO-RTO: 0 /100 WBC (ref 0–0.2)
PLATELET # BLD AUTO: 372 10*3/MM3 (ref 140–450)
PMV BLD AUTO: 9.4 FL (ref 6–12)
POTASSIUM BLD-SCNC: 4.9 MMOL/L (ref 3.5–5.2)
PROT SERPL-MCNC: 7.9 G/DL (ref 6–8.5)
PSA SERPL-MCNC: 0.62 NG/ML (ref 0–4)
RBC # BLD AUTO: 4.93 10*6/MM3 (ref 4.14–5.8)
SODIUM BLD-SCNC: 140 MMOL/L (ref 136–145)
TRIGL SERPL-MCNC: 125 MG/DL (ref 0–150)
TSH SERPL DL<=0.05 MIU/L-ACNC: 2.42 UIU/ML (ref 0.27–4.2)
VLDLC SERPL-MCNC: 25 MG/DL (ref 5–40)
WBC NRBC COR # BLD: 10.45 10*3/MM3 (ref 3.4–10.8)

## 2019-11-22 PROCEDURE — 84443 ASSAY THYROID STIM HORMONE: CPT

## 2019-11-22 PROCEDURE — 82570 ASSAY OF URINE CREATININE: CPT

## 2019-11-22 PROCEDURE — 82043 UR ALBUMIN QUANTITATIVE: CPT

## 2019-11-22 PROCEDURE — 80061 LIPID PANEL: CPT

## 2019-11-22 PROCEDURE — 99396 PREV VISIT EST AGE 40-64: CPT | Performed by: INTERNAL MEDICINE

## 2019-11-22 PROCEDURE — G0103 PSA SCREENING: HCPCS

## 2019-11-22 PROCEDURE — 83036 HEMOGLOBIN GLYCOSYLATED A1C: CPT

## 2019-11-22 PROCEDURE — 80053 COMPREHEN METABOLIC PANEL: CPT

## 2019-11-22 PROCEDURE — 93000 ELECTROCARDIOGRAM COMPLETE: CPT | Performed by: INTERNAL MEDICINE

## 2019-11-22 PROCEDURE — 85025 COMPLETE CBC W/AUTO DIFF WBC: CPT

## 2019-11-22 RX ORDER — OMEPRAZOLE 20 MG/1
20 CAPSULE, DELAYED RELEASE ORAL DAILY
Qty: 90 CAPSULE | Refills: 3 | Status: SHIPPED | OUTPATIENT
Start: 2019-11-22 | End: 2020-01-14 | Stop reason: SDUPTHER

## 2019-11-22 RX ORDER — POTASSIUM CITRATE 15 MEQ/1
TABLET, EXTENDED RELEASE ORAL EVERY 12 HOURS SCHEDULED
COMMUNITY
End: 2019-11-22 | Stop reason: SDUPTHER

## 2019-11-22 RX ORDER — POTASSIUM CITRATE 15 MEQ/1
15 TABLET, EXTENDED RELEASE ORAL EVERY 12 HOURS SCHEDULED
Qty: 90 TABLET | Refills: 3 | Status: SHIPPED | OUTPATIENT
Start: 2019-11-22 | End: 2022-02-25 | Stop reason: SDUPTHER

## 2019-11-22 RX ORDER — TRIAMCINOLONE ACETONIDE 1 MG/G
CREAM TOPICAL 2 TIMES DAILY
Qty: 15 G | Refills: 0 | Status: SHIPPED | OUTPATIENT
Start: 2019-11-22 | End: 2020-07-28

## 2019-11-22 RX ORDER — LISINOPRIL 20 MG/1
20 TABLET ORAL DAILY
Qty: 90 TABLET | Refills: 3 | Status: SHIPPED | OUTPATIENT
Start: 2019-11-22 | End: 2020-11-16

## 2019-11-22 RX ORDER — TAMSULOSIN HYDROCHLORIDE 0.4 MG/1
1 CAPSULE ORAL DAILY
Qty: 90 CAPSULE | Refills: 3 | Status: SHIPPED | OUTPATIENT
Start: 2019-11-22 | End: 2020-11-16

## 2019-11-22 NOTE — PROGRESS NOTES
Chief Complaint   Patient presents with   • Annual Exam     fasting for labs       History of Present Illness  44 y.o. white male presents for wellness exam. He has carpal tunnel surgery. He denies chest pain. He does get yearly eye exam with Dr Pollock. His mood is overall ok. Dr Clement is following his keppra    Review of Systems   Constitutional: Negative for chills, diaphoresis, fatigue and fever.   HENT: Positive for postnasal drip.    Eyes: Negative for visual disturbance.   Respiratory: Negative for cough, shortness of breath and wheezing.    Cardiovascular: Negative for chest pain and palpitations.   Gastrointestinal: Negative for abdominal pain.   Genitourinary: Negative for dysuria.   Musculoskeletal: Negative for back pain.   Skin: Positive for rash.   Allergic/Immunologic: Negative for food allergies.   Neurological: Negative for dizziness.   Hematological: Negative for adenopathy.   Psychiatric/Behavioral: Negative for dysphoric mood and sleep disturbance. The patient is not nervous/anxious.       All other ROS reviewed and negative.    PMSFH:  The following portions of the patient's history were reviewed and updated as appropriate: allergies, current medications, past family history, past medical history, past social history, past surgical history and problem list.      Current Outpatient Medications:   •  levETIRAcetam (KEPPRA) 500 MG tablet, Take 500 mg by mouth Daily., Disp: , Rfl:   •  lisinopril (PRINIVIL,ZESTRIL) 20 MG tablet, TAKE 1 TABLET BY MOUTH ONCE DAILY, Disp: 30 tablet, Rfl: 1  •  omeprazole (priLOSEC) 20 MG capsule, TAKE 1 CAPSULE BY MOUTH ONCE DAILY, Disp: 90 capsule, Rfl: 0  •  Potassium Citrate ER (UROCIT-K 15) 15 MEQ (1620 MG) tablet controlled-release, Every 12 (Twelve) Hours., Disp: , Rfl:   •  tamsulosin (FLOMAX) 0.4 MG capsule 24 hr capsule, TAKE 1 CAPSULE BY MOUTH ONCE DAILY, 1/2 HOUR FOLLOWING THE SAME MEAL EACH DAY, Disp: 30 capsule, Rfl: 1  •  venlafaxine XR (EFFEXOR-XR) 75 MG  "24 hr capsule, TAKE 1 CAPSULE BY MOUTH ONCE DAILY WITH FOOD, Disp: 90 capsule, Rfl: 3  •  triamcinolone (KENALOG) 0.1 % cream, Apply  topically to the appropriate area as directed 2 (Two) Times a Day., Disp: 15 g, Rfl: 0    VITALS:  /82   Pulse 74   Temp 97.1 °F (36.2 °C)   Ht 175.3 cm (69.02\")   Wt 135 kg (297 lb)   BMI 43.84 kg/m²     Physical Exam   Constitutional: He is oriented to person, place, and time. He appears well-developed and well-nourished.   HENT:   Head: Normocephalic.   Right Ear: External ear normal.   Left Ear: External ear normal.   Mouth/Throat: Oropharynx is clear and moist.   Eyes: Conjunctivae and EOM are normal.   Neck: No JVD present.   Cardiovascular: Normal rate, regular rhythm and normal heart sounds.   Pulmonary/Chest: Effort normal and breath sounds normal. No respiratory distress.   Abdominal: Soft. Bowel sounds are normal. There is no tenderness.   Obese    Musculoskeletal: He exhibits no edema.   Lymphadenopathy:     He has no cervical adenopathy.   Neurological: He is alert and oriented to person, place, and time.   Skin: Skin is warm and dry.   Red right elbow redness   Psychiatric: He has a normal mood and affect. His behavior is normal.   Nursing note and vitals reviewed.      LABS:  Results for orders placed or performed in visit on 11/11/19   POC Urinalysis Dipstick, Automated   Result Value Ref Range    Color Orange (A) Yellow, Straw, Dark Yellow, Sarina    Clarity, UA Clear Clear    Specific Gravity  1.025 1.005 - 1.030    pH, Urine 6.0 5.0 - 8.0    Leukocytes Negative Negative    Nitrite, UA Negative Negative    Protein, POC Negative Negative mg/dL    Glucose, UA Negative Negative, 1000 mg/dL (3+) mg/dL    Ketones, UA Negative Negative    Urobilinogen, UA Normal Normal    Bilirubin Negative Negative    Blood, UA Negative Negative         ECG 12 Lead  Date/Time: 11/22/2019 9:47 AM  Performed by: Austyn Carl MD  Authorized by: Austyn Carl MD "   Comparison: compared with previous ECG from 2/3/2012  Similar to previous ECG  Rhythm: sinus rhythm  Rate: normal  QRS axis: normal    Clinical impression: normal ECG                 ASSESSMENT/PLAN:  Problem List Items Addressed This Visit        Cardiovascular and Mediastinum    Hyperlipidemia LDL goal <100     Lipid abnormalities are unchanged.  Nutritional counseling was provided.  Lipids will be reassessed in 6 months.         Relevant Orders    TSH Rfx On Abnormal To Free T4    Lipid Panel    CBC & Differential    Hypertension, benign     Hypertension is improving with treatment.  Continue current treatment regimen.  Dietary sodium restriction.  Weight loss.  Regular aerobic exercise.  Blood pressure will be reassessed at the next regular appointment.         Relevant Orders    Microalbumin / Creatinine Urine Ratio - Urine, Clean Catch    Comprehensive Metabolic Panel       Digestive    Morbid obesity due to excess calories (CMS/HCC)     Obesity is worsening.  Discussed the patient's BMI.  The BMI is above average; BMI management plan is completed.  General weight loss/lifestyle modification strategies discussed (elicit support from others; identify saboteurs; non-food rewards, etc).  Regular aerobic exercise program discussed.            Endocrine    Impaired fasting glucose     Discussed decreasing bad carbohydrates, specifically sweets, breads, potatoes, corn and high caloric drinks (juices, sodas, sweet tea).  Also recommend increasing physical activity, ideally 150 minutes aerobic exercise weekly and resistance exercises 2-3x/week.         Relevant Orders    Hemoglobin A1c       Genitourinary    Renal stone     Renal condition is improving with treatment.  Continue current treatment regimen.  Weight loss.  Monitor daily weight.  Regular aerobic exercise.  Renal condition will be reassessed in 6 months Increase fluids and follow with Dr Navas.         Relevant Medications    Potassium Citrate ER  (UROCIT-K 15) 15 MEQ (1620 MG) tablet controlled-release       Other    Preventative health care - Primary     He does get yearly eye exam with Dr Pollock. His mood is overall ok. Dr Clement is following his keppra. Age-appropriate Counseling:  Discussed preventative medicine issues with patient including regular exercise, healthy diet, stress reduction, adequate sleep and recommended age-appropriate screening studies.  Immunizations reviewed.                Other Visit Diagnoses     Rash        Relevant Medications    triamcinolone (KENALOG) 0.1 % cream    Screening PSA (prostate specific antigen)        Relevant Orders    PSA Screen          FOLLOW-UP:  No Follow-up on file.      Electronically signed by:    Austyn Carl MD

## 2019-11-22 NOTE — ASSESSMENT & PLAN NOTE
He does get yearly eye exam with Dr Pollcok. His mood is overall ok. Dr Clement is following his keppra. Age-appropriate Counseling:  Discussed preventative medicine issues with patient including regular exercise, healthy diet, stress reduction, adequate sleep and recommended age-appropriate screening studies.  Immunizations reviewed.

## 2019-11-22 NOTE — ASSESSMENT & PLAN NOTE
Obesity is worsening.  Discussed the patient's BMI.  The BMI is above average; BMI management plan is completed.  General weight loss/lifestyle modification strategies discussed (elicit support from others; identify saboteurs; non-food rewards, etc).  Regular aerobic exercise program discussed.

## 2019-11-22 NOTE — ASSESSMENT & PLAN NOTE
Renal condition is improving with treatment.  Continue current treatment regimen.  Weight loss.  Monitor daily weight.  Regular aerobic exercise.  Renal condition will be reassessed in 6 months Increase fluids and follow with Dr Navas.

## 2019-11-23 LAB
ALBUMIN UR-MCNC: <1.2 MG/DL
CREAT UR-MCNC: 148.4 MG/DL
MICROALBUMIN/CREAT UR: NORMAL MG/G{CREAT}

## 2020-01-14 RX ORDER — OMEPRAZOLE 20 MG/1
20 CAPSULE, DELAYED RELEASE ORAL DAILY
Qty: 90 CAPSULE | Refills: 3 | Status: SHIPPED | OUTPATIENT
Start: 2020-01-14 | End: 2021-03-30

## 2020-01-14 NOTE — TELEPHONE ENCOUNTER
PT WALKED IN SAYING THAT HE LOST HIS OMEPRAZOLE AND WOULD LIKE FOR US TO CALL IN A NEW ORDER FOR HIM.

## 2020-01-17 ENCOUNTER — HOSPITAL ENCOUNTER (EMERGENCY)
Facility: HOSPITAL | Age: 45
Discharge: HOME | End: 2020-01-18
Attending: EMERGENCY MEDICINE | Admitting: EMERGENCY MEDICINE

## 2020-01-17 DIAGNOSIS — T81.49XA POST-OPERATIVE WOUND ABSCESS: ICD-10-CM

## 2020-01-17 DIAGNOSIS — R03.0 ELEVATED BLOOD PRESSURE READING: ICD-10-CM

## 2020-01-17 DIAGNOSIS — T81.41XA INFECTION OF SUPERFICIAL INCISIONAL SURGICAL SITE AFTER PROCEDURE, INITIAL ENCOUNTER: Primary | ICD-10-CM

## 2020-01-17 DIAGNOSIS — L03.113 CELLULITIS OF RIGHT WRIST: ICD-10-CM

## 2020-01-17 LAB
ALBUMIN SERPL-MCNC: 4.1 G/DL (ref 3.5–5.2)
ALBUMIN/GLOB SERPL: 1.1 G/DL
ALP SERPL-CCNC: 73 U/L (ref 39–117)
ALT SERPL W P-5'-P-CCNC: 68 U/L (ref 1–41)
ANION GAP SERPL CALCULATED.3IONS-SCNC: 11 MMOL/L (ref 5–15)
AST SERPL-CCNC: 39 U/L (ref 1–40)
BASOPHILS # BLD AUTO: 0.09 10*3/MM3 (ref 0–0.2)
BASOPHILS NFR BLD AUTO: 0.6 % (ref 0–1.5)
BILIRUB SERPL-MCNC: 0.3 MG/DL (ref 0.2–1.2)
BUN BLD-MCNC: 9 MG/DL (ref 6–20)
BUN/CREAT SERPL: 10.8 (ref 7–25)
CALCIUM SPEC-SCNC: 9.6 MG/DL (ref 8.6–10.5)
CHLORIDE SERPL-SCNC: 101 MMOL/L (ref 98–107)
CO2 SERPL-SCNC: 26 MMOL/L (ref 22–29)
CREAT BLD-MCNC: 0.83 MG/DL (ref 0.76–1.27)
D-LACTATE SERPL-SCNC: 1.6 MMOL/L (ref 0.5–2)
DEPRECATED RDW RBC AUTO: 41.7 FL (ref 37–54)
EOSINOPHIL # BLD AUTO: 0.29 10*3/MM3 (ref 0–0.4)
EOSINOPHIL NFR BLD AUTO: 1.9 % (ref 0.3–6.2)
ERYTHROCYTE [DISTWIDTH] IN BLOOD BY AUTOMATED COUNT: 13.3 % (ref 12.3–15.4)
GFR SERPL CREATININE-BSD FRML MDRD: 100 ML/MIN/1.73
GLOBULIN UR ELPH-MCNC: 3.9 GM/DL
GLUCOSE BLD-MCNC: 131 MG/DL (ref 65–99)
HCT VFR BLD AUTO: 43.6 % (ref 37.5–51)
HGB BLD-MCNC: 13.8 G/DL (ref 13–17.7)
HOLD SPECIMEN: NORMAL
HOLD SPECIMEN: NORMAL
IMM GRANULOCYTES # BLD AUTO: 0.06 10*3/MM3 (ref 0–0.05)
IMM GRANULOCYTES NFR BLD AUTO: 0.4 % (ref 0–0.5)
LYMPHOCYTES # BLD AUTO: 4.76 10*3/MM3 (ref 0.7–3.1)
LYMPHOCYTES NFR BLD AUTO: 30.9 % (ref 19.6–45.3)
MCH RBC QN AUTO: 27.5 PG (ref 26.6–33)
MCHC RBC AUTO-ENTMCNC: 31.7 G/DL (ref 31.5–35.7)
MCV RBC AUTO: 86.9 FL (ref 79–97)
MONOCYTES # BLD AUTO: 1.32 10*3/MM3 (ref 0.1–0.9)
MONOCYTES NFR BLD AUTO: 8.6 % (ref 5–12)
NEUTROPHILS # BLD AUTO: 8.86 10*3/MM3 (ref 1.7–7)
NEUTROPHILS NFR BLD AUTO: 57.6 % (ref 42.7–76)
NRBC BLD AUTO-RTO: 0 /100 WBC (ref 0–0.2)
PLATELET # BLD AUTO: 387 10*3/MM3 (ref 140–450)
PMV BLD AUTO: 8.9 FL (ref 6–12)
POTASSIUM BLD-SCNC: 4 MMOL/L (ref 3.5–5.2)
PROT SERPL-MCNC: 8 G/DL (ref 6–8.5)
RBC # BLD AUTO: 5.02 10*6/MM3 (ref 4.14–5.8)
SODIUM BLD-SCNC: 138 MMOL/L (ref 136–145)
WBC NRBC COR # BLD: 15.38 10*3/MM3 (ref 3.4–10.8)
WHOLE BLOOD HOLD SPECIMEN: NORMAL
WHOLE BLOOD HOLD SPECIMEN: NORMAL

## 2020-01-17 PROCEDURE — 96366 THER/PROPH/DIAG IV INF ADDON: CPT

## 2020-01-17 PROCEDURE — 99283 EMERGENCY DEPT VISIT LOW MDM: CPT

## 2020-01-17 PROCEDURE — 87186 SC STD MICRODIL/AGAR DIL: CPT | Performed by: EMERGENCY MEDICINE

## 2020-01-17 PROCEDURE — 85025 COMPLETE CBC W/AUTO DIFF WBC: CPT

## 2020-01-17 PROCEDURE — 87040 BLOOD CULTURE FOR BACTERIA: CPT

## 2020-01-17 PROCEDURE — 80053 COMPREHEN METABOLIC PANEL: CPT

## 2020-01-17 PROCEDURE — 96375 TX/PRO/DX INJ NEW DRUG ADDON: CPT

## 2020-01-17 PROCEDURE — 83605 ASSAY OF LACTIC ACID: CPT

## 2020-01-17 PROCEDURE — 25010000002 VANCOMYCIN 10 G RECONSTITUTED SOLUTION: Performed by: EMERGENCY MEDICINE

## 2020-01-17 PROCEDURE — 87070 CULTURE OTHR SPECIMN AEROBIC: CPT | Performed by: EMERGENCY MEDICINE

## 2020-01-17 PROCEDURE — 25010000002 KETOROLAC TROMETHAMINE PER 15 MG: Performed by: EMERGENCY MEDICINE

## 2020-01-17 PROCEDURE — 96365 THER/PROPH/DIAG IV INF INIT: CPT

## 2020-01-17 PROCEDURE — 87147 CULTURE TYPE IMMUNOLOGIC: CPT | Performed by: EMERGENCY MEDICINE

## 2020-01-17 PROCEDURE — 87205 SMEAR GRAM STAIN: CPT | Performed by: EMERGENCY MEDICINE

## 2020-01-17 RX ORDER — HYDROCODONE BITARTRATE AND ACETAMINOPHEN 5; 325 MG/1; MG/1
1 TABLET ORAL EVERY 6 HOURS PRN
COMMUNITY
End: 2020-07-28

## 2020-01-17 RX ORDER — ACETAMINOPHEN 500 MG
1000 TABLET ORAL ONCE
Status: COMPLETED | OUTPATIENT
Start: 2020-01-17 | End: 2020-01-17

## 2020-01-17 RX ORDER — SODIUM CHLORIDE 0.9 % (FLUSH) 0.9 %
10 SYRINGE (ML) INJECTION AS NEEDED
Status: DISCONTINUED | OUTPATIENT
Start: 2020-01-17 | End: 2020-01-18 | Stop reason: HOSPADM

## 2020-01-17 RX ORDER — KETOROLAC TROMETHAMINE 15 MG/ML
15 INJECTION, SOLUTION INTRAMUSCULAR; INTRAVENOUS ONCE
Status: COMPLETED | OUTPATIENT
Start: 2020-01-17 | End: 2020-01-17

## 2020-01-17 RX ADMIN — ACETAMINOPHEN 1000 MG: 500 TABLET, FILM COATED ORAL at 23:19

## 2020-01-17 RX ADMIN — Medication 3 ML: at 22:22

## 2020-01-17 RX ADMIN — VANCOMYCIN HYDROCHLORIDE 2500 MG: 10 INJECTION, POWDER, LYOPHILIZED, FOR SOLUTION INTRAVENOUS at 22:25

## 2020-01-17 RX ADMIN — KETOROLAC TROMETHAMINE 15 MG: 15 INJECTION, SOLUTION INTRAMUSCULAR; INTRAVENOUS at 23:19

## 2020-01-18 VITALS
HEART RATE: 105 BPM | OXYGEN SATURATION: 99 % | HEIGHT: 69 IN | RESPIRATION RATE: 18 BRPM | TEMPERATURE: 99 F | DIASTOLIC BLOOD PRESSURE: 72 MMHG | WEIGHT: 280 LBS | SYSTOLIC BLOOD PRESSURE: 139 MMHG | BODY MASS INDEX: 41.47 KG/M2

## 2020-01-18 PROCEDURE — 96366 THER/PROPH/DIAG IV INF ADDON: CPT

## 2020-01-18 RX ORDER — SACCHAROMYCES BOULARDII 250 MG
250 CAPSULE ORAL 2 TIMES DAILY
Qty: 20 CAPSULE | Refills: 0 | Status: SHIPPED | OUTPATIENT
Start: 2020-01-18 | End: 2020-01-28

## 2020-01-18 RX ORDER — CLINDAMYCIN HYDROCHLORIDE 300 MG/1
300 CAPSULE ORAL 4 TIMES DAILY
Qty: 28 CAPSULE | Refills: 0 | Status: SHIPPED | OUTPATIENT
Start: 2020-01-17 | End: 2020-07-28

## 2020-01-18 NOTE — DISCHARGE INSTRUCTIONS
Return tomorrow for recheck if you have any concerns about any worsening for recheck by orthopedics.  Return earlier for any significant concerns.  Otherwise, if symptoms are improving or overall unchanged, follow-up Monday for wound recheck with the hand surgeon.

## 2020-01-18 NOTE — ED PROVIDER NOTES
Subjective   Sumanth Alfaro is a 44 y.o. male who presents to the ED with c/o post-operative problem. The patient reportedly received carpal tunnel surgery on his right hand by Dr. Chery, hand surgeon, on 1/7/20. He reportedly started noticing erythema around his surgical wound yesterday morning, with worsening pain ever since. He reported to Dr. Chery today, where he reportedly drained purulent material and blood. He was also prescribed Augmentin at his visit today, but he reports worsening pain tonight. He complains of a fever but denies numbness. He also states that he received occupational therapy four days ago and one day ago. There are no other acute complaints at this time.      History provided by:  Patient  Wound Infection   Location:  Right hand  Severity:  Moderate  Onset quality:  Sudden  Duration:  1 day  Timing:  Constant  Progression:  Worsening  Chronicity:  New  Associated symptoms: fever        Review of Systems   Constitutional: Positive for fever.   Skin: Positive for color change and wound.   Neurological: Negative for numbness.   All other systems reviewed and are negative.      Past Medical History:   Diagnosis Date   • Abdominal pain    • Acute cholecystitis    • Carpal tunnel syndrome    • GERD (gastroesophageal reflux disease)    • Headache     Dr Clement for headaches-referred to ENT   • Hypertension    • Kidney stone     per Navas   • Left shoulder pain    • Nephrolithiasis    • Sphincter of Oddi dysfunction        Allergies   Allergen Reactions   • Sulfa Antibiotics Rash       Past Surgical History:   Procedure Laterality Date   • CARPAL TUNNEL RELEASE Bilateral 2003   • CARPAL TUNNEL RELEASE Right 01/2020   • CARPAL TUNNEL RELEASE Left 11/2019   • CHOLECYSTECTOMY  2010    laparoscopic-per DR Adams    • COLONOSCOPY  11/2009    mild ulcer DR Hale   • SHOULDER ARTHROSCOPY  2009    DR [email or web address]   • SINUS SURGERY  2018    Dr Salmon did sinus procedure cleaning out sinuses        Family History   Problem Relation Age of Onset   • Obesity Father    • Coronary artery disease Father    • Hypertension Father    • Diabetes Father    • Hyperlipidemia Father    • Cancer Other         barthrolin gland   • Obesity Other        Social History     Socioeconomic History   • Marital status:      Spouse name: Not on file   • Number of children: Not on file   • Years of education: Not on file   • Highest education level: Not on file   Tobacco Use   • Smoking status: Never Smoker   • Smokeless tobacco: Never Used   Substance and Sexual Activity   • Alcohol use: No   • Drug use: No   • Sexual activity: Defer         Objective   Physical Exam   Constitutional: He is oriented to person, place, and time. He appears well-developed and well-nourished. No distress.   Neck: Normal range of motion.   Cardiovascular: Normal rate, regular rhythm, normal heart sounds and intact distal pulses.   Pulmonary/Chest: Effort normal and breath sounds normal. No respiratory distress.   Musculoskeletal: Normal range of motion.        Arms:  Neurological: He is alert and oriented to person, place, and time.   Skin: Skin is warm. There is erythema.   Erythema over the carpal tunnel. No drainage noted. Suture margins mildly expanded. Neurovascularly intact distally.    Psychiatric: He has a normal mood and affect. His behavior is normal.   Nursing note and vitals reviewed.      Incision & Drainage  Date/Time: 1/17/2020 11:57 PM  Performed by: Richard Goldstein MD  Authorized by: Richard Goldstein MD     Consent:     Consent obtained:  Verbal    Consent given by:  Patient    Risks discussed:  Pain  Location:     Type:  Surgical wound infection    Size:  1 cm    Location:  Upper extremity    Upper extremity location:  Hand    Hand location:  R hand  Pre-procedure details:     Skin preparation:  Chloraprep  Anesthesia (see MAR for exact dosages):     Anesthesia method:  Local infiltration    Local anesthetic:   Lidocaine 1% w/o epi  Procedure type:     Complexity:  Simple  Procedure details:     Surgical wound: surgical wound reopened      Incision types:  Single straight    Incision depth:  Subcutaneous    Scalpel blade:  11    Wound management:  Extensive cleaning    Drainage:  Purulent    Drainage amount:  Moderate    Wound treatment:  Wound left open    Packing materials:  1/4 in iodoform gauze  Post-procedure details:     Patient tolerance of procedure:  Tolerated well, no immediate complications             ED Course  ED Course as of Jan 18 0018 Fri Jan 17, 2020 2136 Temp: 100.3 °F (37.9 °C) [RS]   2136 Heart Rate: 114 [RS]   2151 Dr. Goldstein is at bedside reevaluating the patient.    [SK]   2157 WBC(!): 15.38 [RS]   2236 I discussed the case with Dr. Amaya with [facility] orthopedics who is on for Dr. Pond.  He agrees with the plan for edition and transition to clindamycin after the evaluation here tonight.  He advised the patient is okay to go home after this is done and that he is willing to see the patient here in the emergency department tomorrow if he needs another wound recheck or has any worsening.  He advises that he does have privileges here in the ER and is on call on the weekend and so can come see him if necessary.    [RS]   2242 I updated the patient on the plan.  He voiced understanding and agrees.    [RS]   2357 I&D performed of the right wrist secondary to developing cellulitis with superficial abscess.  Please see the procedure note for further details.  Patient is to check the wound daily with return for any concerns or worsening.  We reviewed the recommendations from the orthopedist who will see the patient tomorrow if necessary otherwise the patient is to follow-up Monday at 11 AM as scheduled. I had a discussion with the patient/family regarding diagnosis, diagnostic results, treatment plan, and medications.  The patient/family indicated understanding of these instructions.  I spent  adequate time at the bedside proceeding discharge necessary to personally discuss the aftercare instructions, giving patient education, providing explanations of the results of our evaluations/findings, and my decision making to assure that the patient/family understand the plan of care.  Time was allotted to answer questions at that time and throughout the ED course.  Emphasis was placed on timely follow-up after discharge.  I also discussed the potential for the development of an acute emergent condition requiring further evaluation, admission, or even surgical intervention. I discussed that we found nothing during the visit today indicating the need for further workup, admission, or the presence of an unstable medical condition.  I encouraged the patient to return to the emergency department immediately for ANY concerns, worsening, new complaints, or if symptoms persist and unable to seek follow-up in a timely fashion.  The patient/family expressed understanding and agreement with this plan.     [RS]      ED Course User Index  [RS] Richard Goldstein MD  [SK] Radha Pa     Recent Results (from the past 24 hour(s))   Comprehensive Metabolic Panel    Collection Time: 01/17/20  9:38 PM   Result Value Ref Range    Glucose 131 (H) 65 - 99 mg/dL    BUN 9 6 - 20 mg/dL    Creatinine 0.83 0.76 - 1.27 mg/dL    Sodium 138 136 - 145 mmol/L    Potassium 4.0 3.5 - 5.2 mmol/L    Chloride 101 98 - 107 mmol/L    CO2 26.0 22.0 - 29.0 mmol/L    Calcium 9.6 8.6 - 10.5 mg/dL    Total Protein 8.0 6.0 - 8.5 g/dL    Albumin 4.10 3.50 - 5.20 g/dL    ALT (SGPT) 68 (H) 1 - 41 U/L    AST (SGOT) 39 1 - 40 U/L    Alkaline Phosphatase 73 39 - 117 U/L    Total Bilirubin 0.3 0.2 - 1.2 mg/dL    eGFR Non African Amer 100 >60 mL/min/1.73    Globulin 3.9 gm/dL    A/G Ratio 1.1 g/dL    BUN/Creatinine Ratio 10.8 7.0 - 25.0    Anion Gap 11.0 5.0 - 15.0 mmol/L   Lactic Acid, Plasma    Collection Time: 01/17/20  9:38 PM   Result Value Ref Range     Lactate 1.6 0.5 - 2.0 mmol/L   CBC Auto Differential    Collection Time: 01/17/20  9:38 PM   Result Value Ref Range    WBC 15.38 (H) 3.40 - 10.80 10*3/mm3    RBC 5.02 4.14 - 5.80 10*6/mm3    Hemoglobin 13.8 13.0 - 17.7 g/dL    Hematocrit 43.6 37.5 - 51.0 %    MCV 86.9 79.0 - 97.0 fL    MCH 27.5 26.6 - 33.0 pg    MCHC 31.7 31.5 - 35.7 g/dL    RDW 13.3 12.3 - 15.4 %    RDW-SD 41.7 37.0 - 54.0 fl    MPV 8.9 6.0 - 12.0 fL    Platelets 387 140 - 450 10*3/mm3    Neutrophil % 57.6 42.7 - 76.0 %    Lymphocyte % 30.9 19.6 - 45.3 %    Monocyte % 8.6 5.0 - 12.0 %    Eosinophil % 1.9 0.3 - 6.2 %    Basophil % 0.6 0.0 - 1.5 %    Immature Grans % 0.4 0.0 - 0.5 %    Neutrophils, Absolute 8.86 (H) 1.70 - 7.00 10*3/mm3    Lymphocytes, Absolute 4.76 (H) 0.70 - 3.10 10*3/mm3    Monocytes, Absolute 1.32 (H) 0.10 - 0.90 10*3/mm3    Eosinophils, Absolute 0.29 0.00 - 0.40 10*3/mm3    Basophils, Absolute 0.09 0.00 - 0.20 10*3/mm3    Immature Grans, Absolute 0.06 (H) 0.00 - 0.05 10*3/mm3    nRBC 0.0 0.0 - 0.2 /100 WBC   Light Blue Top    Collection Time: 01/17/20  9:38 PM   Result Value Ref Range    Extra Tube hold for add-on    Green Top (Gel)    Collection Time: 01/17/20  9:38 PM   Result Value Ref Range    Extra Tube Hold for add-ons.    Lavender Top    Collection Time: 01/17/20  9:38 PM   Result Value Ref Range    Extra Tube hold for add-on    Gold Top - SST    Collection Time: 01/17/20  9:38 PM   Result Value Ref Range    Extra Tube Hold for add-ons.      Note: In addition to lab results from this visit, the labs listed above may include labs taken at another facility or during a different encounter within the last 24 hours. Please correlate lab times with ED admission and discharge times for further clarification of the services performed during this visit.    No orders to display     Vitals:    01/17/20 2121   BP: 164/79   BP Location: Left arm   Patient Position: Sitting   Pulse: 114   Resp: 18   Temp: 100.3 °F (37.9 °C)  "  TempSrc: Oral   SpO2: 98%   Weight: 127 kg (280 lb)   Height: 175.3 cm (69\")     Medications   sodium chloride 0.9 % flush 10 mL (has no administration in time range)   vancomycin 2500 mg/500 mL 0.9% NS IVPB ([facility]) (2,500 mg Intravenous New Bag 1/17/20 2225)   lidocaine-epinephrine-tetracaine (LET) topical gel 3 mL (3 mL Topical Given 1/17/20 2222)   ketorolac (TORADOL) injection 15 mg (15 mg Intravenous Given 1/17/20 2319)   acetaminophen (TYLENOL) tablet 1,000 mg (1,000 mg Oral Given 1/17/20 2319)     ECG/EMG Results (last 24 hours)     ** No results found for the last 24 hours. **        No orders to display                                                  MDM  Number of Diagnoses or Management Options  Cellulitis of right wrist:   Elevated blood pressure reading:   Infection of superficial incisional surgical site after procedure, initial encounter:   Post-operative wound abscess:      Amount and/or Complexity of Data Reviewed  Clinical lab tests: reviewed  Discuss the patient with other providers: yes        Final diagnoses:   Infection of superficial incisional surgical site after procedure, initial encounter   Cellulitis of right wrist   Post-operative wound abscess   Elevated blood pressure reading       Documentation assistance provided by santos Pa.  Information recorded by the scribe was done at my direction and has been verified and validated by me.     Radha Pa  01/17/20 2200       Radha Pa  01/17/20 2208       Radha Pa  01/18/20 0002       Richard Goldstein MD  01/18/20 0018    "

## 2020-01-20 LAB
BACTERIA SPEC AEROBE CULT: ABNORMAL
GRAM STN SPEC: ABNORMAL
GRAM STN SPEC: ABNORMAL

## 2020-01-22 LAB
BACTERIA SPEC AEROBE CULT: NORMAL
BACTERIA SPEC AEROBE CULT: NORMAL

## 2020-01-27 ENCOUNTER — TELEPHONE (OUTPATIENT)
Dept: INTERNAL MEDICINE | Facility: CLINIC | Age: 45
End: 2020-01-27

## 2020-01-27 RX ORDER — OSELTAMIVIR PHOSPHATE 75 MG/1
75 CAPSULE ORAL DAILY
Qty: 10 CAPSULE | Refills: 0 | Status: SHIPPED | OUTPATIENT
Start: 2020-01-27 | End: 2020-02-06

## 2020-01-27 NOTE — TELEPHONE ENCOUNTER
Pt called because he was recently exposed to the flu and would like to know if an order for Tamiflu could be sent to his Pharmacy at [facility] [place]       [phone]

## 2020-01-28 ENCOUNTER — OFFICE (OUTPATIENT)
Dept: URBAN - METROPOLITAN AREA CLINIC 4 | Facility: CLINIC | Age: 45
End: 2020-01-28

## 2020-01-28 VITALS — DIASTOLIC BLOOD PRESSURE: 68 MMHG | SYSTOLIC BLOOD PRESSURE: 131 MMHG | HEIGHT: 69 IN | WEIGHT: 298 LBS

## 2020-01-28 DIAGNOSIS — K64.4 RESIDUAL HEMORRHOIDAL SKIN TAGS: ICD-10-CM

## 2020-01-28 DIAGNOSIS — I10 ESSENTIAL (PRIMARY) HYPERTENSION: ICD-10-CM

## 2020-01-28 DIAGNOSIS — Z86.010 PERSONAL HISTORY OF COLONIC POLYPS: ICD-10-CM

## 2020-01-28 PROCEDURE — 99213 OFFICE O/P EST LOW 20 MIN: CPT | Performed by: INTERNAL MEDICINE

## 2020-01-28 RX ORDER — HYDROCORTISONE 25 MG/G
CREAM TOPICAL
Qty: 1 | Refills: 11 | Status: ACTIVE
Start: 2020-01-28

## 2020-03-02 ENCOUNTER — OFFICE VISIT (OUTPATIENT)
Dept: INTERNAL MEDICINE | Facility: CLINIC | Age: 45
End: 2020-03-02

## 2020-03-02 ENCOUNTER — LAB (OUTPATIENT)
Dept: LAB | Facility: HOSPITAL | Age: 45
End: 2020-03-02

## 2020-03-02 VITALS
WEIGHT: 300 LBS | SYSTOLIC BLOOD PRESSURE: 118 MMHG | HEIGHT: 69 IN | BODY MASS INDEX: 44.43 KG/M2 | HEART RATE: 89 BPM | DIASTOLIC BLOOD PRESSURE: 68 MMHG | TEMPERATURE: 97.5 F

## 2020-03-02 DIAGNOSIS — E78.5 HYPERLIPIDEMIA LDL GOAL <100: ICD-10-CM

## 2020-03-02 DIAGNOSIS — H53.8 BLURRY VISION: ICD-10-CM

## 2020-03-02 DIAGNOSIS — I10 HYPERTENSION, BENIGN: ICD-10-CM

## 2020-03-02 DIAGNOSIS — R00.2 PALPITATIONS: ICD-10-CM

## 2020-03-02 DIAGNOSIS — R73.01 IMPAIRED FASTING GLUCOSE: ICD-10-CM

## 2020-03-02 DIAGNOSIS — R73.01 IMPAIRED FASTING GLUCOSE: Primary | ICD-10-CM

## 2020-03-02 LAB — HBA1C MFR BLD: 6.46 % (ref 4.8–5.6)

## 2020-03-02 PROCEDURE — 99214 OFFICE O/P EST MOD 30 MIN: CPT | Performed by: INTERNAL MEDICINE

## 2020-03-02 PROCEDURE — 85025 COMPLETE CBC W/AUTO DIFF WBC: CPT

## 2020-03-02 PROCEDURE — 80048 BASIC METABOLIC PNL TOTAL CA: CPT

## 2020-03-02 PROCEDURE — 83036 HEMOGLOBIN GLYCOSYLATED A1C: CPT

## 2020-03-02 PROCEDURE — 83735 ASSAY OF MAGNESIUM: CPT

## 2020-03-02 NOTE — ASSESSMENT & PLAN NOTE
Worsened and Discussed decreasing bad carbohydrates, specifically sweets, breads, potatoes, corn and high caloric drinks (juices, sodas, sweet tea).  Also recommend increasing physical activity, ideally 150 minutes aerobic exercise weekly and resistance exercises 2-3x/week.

## 2020-03-02 NOTE — PROGRESS NOTES
Chief Complaint   Patient presents with   • Palpitations   • Blurred Vision     worried he has crossed over into diabetes       History of Present Illness  44 y.o. white male presents for some episodes of palpitations for a few minutes. It goes away with deep breathing. He denies lightheaded feeling but feels that his chest is light. He denies associated chest pain or shortness of breath. The symptoms have been every other day for 2 weeks and stable.   He has had some blurry vision.     Review of Systems   Constitutional: Negative for chills, diaphoresis and fatigue.   HENT: Positive for postnasal drip.    Eyes: Positive for visual disturbance (blurred vision).   Respiratory: Negative for cough, shortness of breath and wheezing.    Cardiovascular: Positive for palpitations. Negative for chest pain.   Gastrointestinal: Negative for abdominal pain.   Genitourinary: Negative for dysuria.   Skin: Negative for rash.   Allergic/Immunologic: Positive for environmental allergies.   Neurological: Negative for dizziness.   Hematological: Negative for adenopathy.   Psychiatric/Behavioral: Positive for sleep disturbance. The patient is nervous/anxious.      All other ROS reviewed and negative.    PMSFH:  The following portions of the patient's history were reviewed and updated as appropriate: allergies, current medications, past family history, past medical history, past social history, past surgical history and problem list.      Current Outpatient Medications:   •  clindamycin (CLEOCIN) 300 MG capsule, Take 1 capsule by mouth 4 (Four) Times a Day., Disp: 28 capsule, Rfl: 0  •  HYDROcodone-acetaminophen (NORCO) 5-325 MG per tablet, Take 1 tablet by mouth Every 6 (Six) Hours As Needed., Disp: , Rfl:   •  levETIRAcetam (KEPPRA) 500 MG tablet, Take 500 mg by mouth Daily., Disp: , Rfl:   •  lisinopril (PRINIVIL,ZESTRIL) 20 MG tablet, Take 1 tablet by mouth Daily., Disp: 90 tablet, Rfl: 3  •  omeprazole (priLOSEC) 20 MG capsule,  "Take 1 capsule by mouth Daily., Disp: 90 capsule, Rfl: 3  •  Potassium Citrate ER (UROCIT-K 15) 15 MEQ (1620 MG) tablet controlled-release, Take 15 mEq by mouth Every 12 (Twelve) Hours., Disp: 90 tablet, Rfl: 3  •  tamsulosin (FLOMAX) 0.4 MG capsule 24 hr capsule, Take 1 capsule by mouth Daily. following the same meal each day, Disp: 90 capsule, Rfl: 3  •  triamcinolone (KENALOG) 0.1 % cream, Apply  topically to the appropriate area as directed 2 (Two) Times a Day., Disp: 15 g, Rfl: 0  •  venlafaxine XR (EFFEXOR-XR) 75 MG 24 hr capsule, TAKE 1 CAPSULE BY MOUTH ONCE DAILY WITH FOOD, Disp: 90 capsule, Rfl: 3    VITALS:  /68   Pulse 89   Temp 97.5 °F (36.4 °C)   Ht 175.3 cm (69.02\")   Wt 136 kg (300 lb)   BMI 44.28 kg/m²     Physical Exam   Constitutional: He is oriented to person, place, and time. He appears well-developed and well-nourished.   HENT:   Head: Normocephalic.   Right Ear: External ear normal.   Left Ear: External ear normal.   Mouth/Throat: Oropharynx is clear and moist.   Eyes: Conjunctivae and EOM are normal.   Neck: No JVD present.   Cardiovascular: Normal rate, regular rhythm and normal heart sounds.   No murmur heard.  Pulmonary/Chest: Effort normal and breath sounds normal. No respiratory distress. He has no wheezes.   Abdominal: Soft. Bowel sounds are normal. There is no tenderness.   Obese    Musculoskeletal: He exhibits no edema.   Lymphadenopathy:     He has no cervical adenopathy.   Neurological: He is alert and oriented to person, place, and time.   Skin: Skin is warm and dry.   Psychiatric: He has a normal mood and affect. His behavior is normal.   Nursing note and vitals reviewed.      LABS:  Results for orders placed or performed during the hospital encounter of 01/17/20   Blood Culture - Blood, Arm, Right   Result Value Ref Range    Blood Culture No growth at 5 days    Blood Culture - Blood, Arm, Left   Result Value Ref Range    Blood Culture No growth at 5 days    Wound " Culture - Surgical Site, Hand   Result Value Ref Range    Wound Culture Scant growth (1+) Staphylococcus aureus, MRSA (A)     Gram Stain Rare (1+) WBCs per low power field     Gram Stain No organisms seen        Susceptibility    Staphylococcus aureus, MRSA - CHARIS*     Clindamycin 0.25 Susceptible ug/ml     Inducible Clindamycin Resistance NEG Negative ug/ml     Erythromycin >=8 Resistant ug/ml     Oxacillin >=4 Resistant ug/ml     Penicillin G >=0.5 Resistant ug/ml     Rifampin <=0.5 Susceptible ug/ml     Tetracycline <=1 Susceptible ug/ml     Trimethoprim + Sulfamethoxazole <=10 Susceptible ug/ml     Vancomycin 1 Susceptible ug/ml     * This isolate does not demonstrate inducible clindamycin resistance in vitro.     Comprehensive Metabolic Panel   Result Value Ref Range    Glucose 131 (H) 65 - 99 mg/dL    BUN 9 6 - 20 mg/dL    Creatinine 0.83 0.76 - 1.27 mg/dL    Sodium 138 136 - 145 mmol/L    Potassium 4.0 3.5 - 5.2 mmol/L    Chloride 101 98 - 107 mmol/L    CO2 26.0 22.0 - 29.0 mmol/L    Calcium 9.6 8.6 - 10.5 mg/dL    Total Protein 8.0 6.0 - 8.5 g/dL    Albumin 4.10 3.50 - 5.20 g/dL    ALT (SGPT) 68 (H) 1 - 41 U/L    AST (SGOT) 39 1 - 40 U/L    Alkaline Phosphatase 73 39 - 117 U/L    Total Bilirubin 0.3 0.2 - 1.2 mg/dL    eGFR Non African Amer 100 >60 mL/min/1.73    Globulin 3.9 gm/dL    A/G Ratio 1.1 g/dL    BUN/Creatinine Ratio 10.8 7.0 - 25.0    Anion Gap 11.0 5.0 - 15.0 mmol/L   Lactic Acid, Plasma   Result Value Ref Range    Lactate 1.6 0.5 - 2.0 mmol/L   CBC Auto Differential   Result Value Ref Range    WBC 15.38 (H) 3.40 - 10.80 10*3/mm3    RBC 5.02 4.14 - 5.80 10*6/mm3    Hemoglobin 13.8 13.0 - 17.7 g/dL    Hematocrit 43.6 37.5 - 51.0 %    MCV 86.9 79.0 - 97.0 fL    MCH 27.5 26.6 - 33.0 pg    MCHC 31.7 31.5 - 35.7 g/dL    RDW 13.3 12.3 - 15.4 %    RDW-SD 41.7 37.0 - 54.0 fl    MPV 8.9 6.0 - 12.0 fL    Platelets 387 140 - 450 10*3/mm3    Neutrophil % 57.6 42.7 - 76.0 %    Lymphocyte % 30.9 19.6 - 45.3 %     Monocyte % 8.6 5.0 - 12.0 %    Eosinophil % 1.9 0.3 - 6.2 %    Basophil % 0.6 0.0 - 1.5 %    Immature Grans % 0.4 0.0 - 0.5 %    Neutrophils, Absolute 8.86 (H) 1.70 - 7.00 10*3/mm3    Lymphocytes, Absolute 4.76 (H) 0.70 - 3.10 10*3/mm3    Monocytes, Absolute 1.32 (H) 0.10 - 0.90 10*3/mm3    Eosinophils, Absolute 0.29 0.00 - 0.40 10*3/mm3    Basophils, Absolute 0.09 0.00 - 0.20 10*3/mm3    Immature Grans, Absolute 0.06 (H) 0.00 - 0.05 10*3/mm3    nRBC 0.0 0.0 - 0.2 /100 WBC   Light Blue Top   Result Value Ref Range    Extra Tube hold for add-on    Green Top (Gel)   Result Value Ref Range    Extra Tube Hold for add-ons.    Lavender Top   Result Value Ref Range    Extra Tube hold for add-on    Gold Top - SST   Result Value Ref Range    Extra Tube Hold for add-ons.        Procedures         ASSESSMENT/PLAN:  Problem List Items Addressed This Visit        Cardiovascular and Mediastinum    Hyperlipidemia LDL goal <100     Lipid abnormalities are unchanged.  Nutritional counseling was provided.  Lipids will be reassessed in 3 months.         Relevant Orders    CBC & Differential    Hypertension, benign     Hypertension is unchanged.  Continue current treatment regimen.  Dietary sodium restriction.  Weight loss.  Regular aerobic exercise.  Blood pressure will be reassessed at the next regular appointment.            Endocrine    Impaired fasting glucose - Primary     Worsened and Discussed decreasing bad carbohydrates, specifically sweets, breads, potatoes, corn and high caloric drinks (juices, sodas, sweet tea).  Also recommend increasing physical activity, ideally 150 minutes aerobic exercise weekly and resistance exercises 2-3x/week.         Relevant Orders    Hemoglobin A1c      Other Visit Diagnoses     Palpitations        EKG normal 11/2019 and exam ok now. Get to cardiology and check labs.     Relevant Orders    Basic Metabolic Panel    Magnesium    Ambulatory Referral to Cardiology    Blurry vision        Mild at  times and will check labs and will see Dr [facility] tomorrow.           FOLLOW-UP:  No follow-ups on file.      Electronically signed by:    Austyn Carl MD

## 2020-03-03 ENCOUNTER — TELEPHONE (OUTPATIENT)
Dept: INTERNAL MEDICINE | Facility: CLINIC | Age: 45
End: 2020-03-03

## 2020-03-03 LAB
ANION GAP SERPL CALCULATED.3IONS-SCNC: 15 MMOL/L (ref 5–15)
BASOPHILS # BLD AUTO: 0.12 10*3/MM3 (ref 0–0.2)
BASOPHILS NFR BLD AUTO: 1 % (ref 0–1.5)
BUN BLD-MCNC: 9 MG/DL (ref 6–20)
BUN/CREAT SERPL: 10.1 (ref 7–25)
CALCIUM SPEC-SCNC: 9.4 MG/DL (ref 8.6–10.5)
CHLORIDE SERPL-SCNC: 100 MMOL/L (ref 98–107)
CO2 SERPL-SCNC: 24 MMOL/L (ref 22–29)
CREAT BLD-MCNC: 0.89 MG/DL (ref 0.76–1.27)
DEPRECATED RDW RBC AUTO: 41.6 FL (ref 37–54)
EOSINOPHIL # BLD AUTO: 0.37 10*3/MM3 (ref 0–0.4)
EOSINOPHIL NFR BLD AUTO: 3 % (ref 0.3–6.2)
ERYTHROCYTE [DISTWIDTH] IN BLOOD BY AUTOMATED COUNT: 14.2 % (ref 12.3–15.4)
GFR SERPL CREATININE-BSD FRML MDRD: 92 ML/MIN/1.73
GLUCOSE BLD-MCNC: 67 MG/DL (ref 65–99)
HCT VFR BLD AUTO: 41.1 % (ref 37.5–51)
HGB BLD-MCNC: 13.6 G/DL (ref 13–17.7)
IMM GRANULOCYTES # BLD AUTO: 0.05 10*3/MM3 (ref 0–0.05)
IMM GRANULOCYTES NFR BLD AUTO: 0.4 % (ref 0–0.5)
LYMPHOCYTES # BLD AUTO: 4.42 10*3/MM3 (ref 0.7–3.1)
LYMPHOCYTES NFR BLD AUTO: 35.3 % (ref 19.6–45.3)
MAGNESIUM SERPL-MCNC: 2.3 MG/DL (ref 1.6–2.6)
MCH RBC QN AUTO: 27.3 PG (ref 26.6–33)
MCHC RBC AUTO-ENTMCNC: 33.1 G/DL (ref 31.5–35.7)
MCV RBC AUTO: 82.4 FL (ref 79–97)
MONOCYTES # BLD AUTO: 0.81 10*3/MM3 (ref 0.1–0.9)
MONOCYTES NFR BLD AUTO: 6.5 % (ref 5–12)
NEUTROPHILS # BLD AUTO: 6.74 10*3/MM3 (ref 1.7–7)
NEUTROPHILS NFR BLD AUTO: 53.8 % (ref 42.7–76)
NRBC BLD AUTO-RTO: 0 /100 WBC (ref 0–0.2)
PLATELET # BLD AUTO: 385 10*3/MM3 (ref 140–450)
PMV BLD AUTO: 9.5 FL (ref 6–12)
POTASSIUM BLD-SCNC: 4.1 MMOL/L (ref 3.5–5.2)
RBC # BLD AUTO: 4.99 10*6/MM3 (ref 4.14–5.8)
SODIUM BLD-SCNC: 139 MMOL/L (ref 136–145)
WBC NRBC COR # BLD: 12.51 10*3/MM3 (ref 3.4–10.8)

## 2020-03-03 NOTE — TELEPHONE ENCOUNTER
[facility] called and wanted pt's most recent labs faxed over. I faxed over all labs from 3/2/2020 to [phone].

## 2020-03-05 RX ORDER — VENLAFAXINE HYDROCHLORIDE 75 MG/1
CAPSULE, EXTENDED RELEASE ORAL
Qty: 90 CAPSULE | Refills: 1 | Status: SHIPPED | OUTPATIENT
Start: 2020-03-05 | End: 2020-08-31

## 2020-04-09 NOTE — PROGRESS NOTES
[facility] at [facility]  TeleHealth Consult  Sumanth Alfaro  1975    VISIT DATE:  04/13/20    PCP:   Austyn Carl MD  [address] [id number]    This patient has consented to a telehealth visit via phone The visit was scheduled as a telephonevisit to comply with patient safety concerns in accordance with [facility] recommendations.  All vitals recorded within this visit are reported by the patient.  I spent  25 minutes in total including but not limited to the 15 minutes spent in direct conversation with this patient.      CC:  Hypertension      Problem List:  1.  Obesity  2.  BPH  3.  HTN  4.  GERD  5.  Lipids: Total cholesterol 157, LDL 94, triglycerides 125, HDL 38.  A1c borderline 6.46  7.  Migraines  8.  ?  YANET, remote normal sleep study  9.  Carpal tunnel    History of Present Illness:  Sumanth Alfaro  Is a 44 y.o. male with pertinent cardiac history detailed above.  Patient is referred by Dr. Carl for evaluation of palpitations.  C/o sensation of heart fluttering and skipped beats. Episodes started Mid February. Initially daily.  Now once per week.   Patient states they can last hours at a time.  States the episodes typically occur at rest, does not notice worsening with activity.  He denies associated chest tightness, dyspnea, diaphoresis, lightheadedness, no history of syncope.  No recent change in medications.  Patient's TSH was normal in November.  Most recent EKG from November reviewed and showed normal sinus rhythm, normal EKG.  The patient states he does snore at night does not have diagnosed sleep apnea.  He had a sleep test many years ago that was negative at that time      Patient Active Problem List    Diagnosis Date Noted   • Preventative health care 11/22/2019   • Esophagitis, reflux 11/08/2019   • Pain in lower back 11/08/2019   • Hyperlipidemia LDL goal <100 11/08/2019   • Dysuria 11/08/2019   • Morbid obesity due to  excess calories (CMS/[facility]) 11/08/2019   • Acute bacterial rhinosinusitis 11/08/2019   • Renal stone 11/08/2019   • Benign prostatic hyperplasia (BPH) with urinary urgency 11/08/2019   • Impaired fasting glucose 11/08/2019   • Hypertension, benign 11/08/2019   • Depression 11/08/2019   • Allergic rhinitis 11/08/2019       Allergies   Allergen Reactions   • Sulfa Antibiotics Rash       Social History     Socioeconomic History   • Marital status:      Spouse name: Not on file   • Number of children: Not on file   • Years of education: Not on file   • Highest education level: Not on file   Tobacco Use   • Smoking status: Never Smoker   • Smokeless tobacco: Never Used   Substance and Sexual Activity   • Alcohol use: No   • Drug use: No   • Sexual activity: Defer       Family History   Problem Relation Age of Onset   • Obesity Father    • Coronary artery disease Father    • Hypertension Father    • Diabetes Father    • Hyperlipidemia Father    • Cancer Other         barthrolin gland   • Obesity Other        Current Medications:    Current Outpatient Medications:   •  clindamycin (CLEOCIN) 300 MG capsule, Take 1 capsule by mouth 4 (Four) Times a Day., Disp: 28 capsule, Rfl: 0  •  HYDROcodone-acetaminophen (NORCO) 5-325 MG per tablet, Take 1 tablet by mouth Every 6 (Six) Hours As Needed., Disp: , Rfl:   •  levETIRAcetam (KEPPRA) 500 MG tablet, Take 500 mg by mouth Daily., Disp: , Rfl:   •  lisinopril (PRINIVIL,ZESTRIL) 20 MG tablet, Take 1 tablet by mouth Daily., Disp: 90 tablet, Rfl: 3  •  omeprazole (priLOSEC) 20 MG capsule, Take 1 capsule by mouth Daily., Disp: 90 capsule, Rfl: 3  •  Potassium Citrate ER (UROCIT-K 15) 15 MEQ (1620 MG) tablet controlled-release, Take 15 mEq by mouth Every 12 (Twelve) Hours., Disp: 90 tablet, Rfl: 3  •  tamsulosin (FLOMAX) 0.4 MG capsule 24 hr capsule, Take 1 capsule by mouth Daily. following the same meal each day, Disp: 90 capsule, Rfl: 3  •  triamcinolone (KENALOG) 0.1 % cream,  "Apply  topically to the appropriate area as directed 2 (Two) Times a Day., Disp: 15 g, Rfl: 0  •  venlafaxine XR (EFFEXOR-XR) 75 MG 24 hr capsule, TAKE 1 CAPSULE BY MOUTH ONCE DAILY WITH FOOD, Disp: 90 capsule, Rfl: 1     Review of Systems   Cardiovascular: Positive for irregular heartbeat. Negative for chest pain, dyspnea on exertion, near-syncope, orthopnea, palpitations and syncope.   Respiratory: Negative for cough and shortness of breath.    Gastrointestinal: Negative for abdominal pain.   Psychiatric/Behavioral: The patient is nervous/anxious (Sensation with palpitation).    All other systems reviewed and are negative.      Vitals:    04/13/20 0937   Weight: 132 kg (290 lb)   Height: 175.3 cm (69\")       Physical Exam   Constitutional: No distress.   Psychiatric: He has a normal mood and affect.       Diagnostic Data:  Procedures  Lab Results   Component Value Date    TRIG 125 11/22/2019    HDL 38 (L) 11/22/2019     Lab Results   Component Value Date    GLUCOSE 67 03/02/2020    BUN 9 03/02/2020    CREATININE 0.89 03/02/2020     03/02/2020    K 4.1 03/02/2020     03/02/2020    CO2 24.0 03/02/2020     Lab Results   Component Value Date    HGBA1C 6.46 (H) 03/02/2020     Lab Results   Component Value Date    WBC 12.51 (H) 03/02/2020    HGB 13.6 03/02/2020    HCT 41.1 03/02/2020     03/02/2020       Assessment:   Diagnosis Plan   1. Palpitations  Holter Monitor - 72 Hour Up To 21 Days    Adult Transthoracic Echo Complete W/ Cont if Necessary Per Protocol       Plan:      1.  Palpitations  -Normal labs in March 2020  -TSH within normal limits  -Send ZIO Holter, 14 days  -Nonurgent echo to be scheduled within 1-2 months  -No significant associated symptoms, no chest pain.  -EKG from November within normal limits    2.  Hypertension  -He is on lisinopril, does not have blood pressure cuff at home will monitor at next availability to have an office visit  -Lipids reviewed, ASCVD risk 2.2%, does not " meet statin criteria at present    3.  Borderline DM  -Encouraged exercise, diet modifications, and weight loss  -Continue to follow with Dr. Carl    Schedule follow-up for July, tentatively when we can have an in person visit            Alan Woodson MD

## 2020-04-13 ENCOUNTER — OFFICE VISIT (OUTPATIENT)
Dept: CARDIOLOGY | Facility: CLINIC | Age: 45
End: 2020-04-13

## 2020-04-13 VITALS — BODY MASS INDEX: 42.95 KG/M2 | WEIGHT: 290 LBS | HEIGHT: 69 IN

## 2020-04-13 DIAGNOSIS — R00.2 PALPITATIONS: Primary | ICD-10-CM

## 2020-04-13 PROCEDURE — 99243 OFF/OP CNSLTJ NEW/EST LOW 30: CPT | Performed by: INTERNAL MEDICINE

## 2020-05-07 ENCOUNTER — TELEPHONE (OUTPATIENT)
Dept: CARDIOLOGY | Facility: CLINIC | Age: 45
End: 2020-05-07

## 2020-05-07 NOTE — TELEPHONE ENCOUNTER
Patient to have right should SLAP repair with tenodesis surgery on 5/23/20 with Dr. Ayoub. They are requesting a pre-operative cardiac risk assessment.     Thanks!

## 2020-05-07 NOTE — TELEPHONE ENCOUNTER
So far I have only seen this patient as a telehealth visit.  He does have an echo ordered which is pending.  If we could have him get that completed and also get an office visit to get an updated EKG, would like to have those completed before commenting on his risk.     If no significant abnormality seen on either test I would say he can proceed at low risk for cardiac complications

## 2020-05-13 ENCOUNTER — HOSPITAL ENCOUNTER (OUTPATIENT)
Dept: CARDIOLOGY | Facility: HOSPITAL | Age: 45
Discharge: HOME | End: 2020-05-13
Admitting: INTERNAL MEDICINE

## 2020-05-13 ENCOUNTER — TELEPHONE (OUTPATIENT)
Dept: CARDIOLOGY | Facility: CLINIC | Age: 45
End: 2020-05-13

## 2020-05-13 VITALS
BODY MASS INDEX: 42.95 KG/M2 | SYSTOLIC BLOOD PRESSURE: 129 MMHG | WEIGHT: 290 LBS | HEIGHT: 69 IN | DIASTOLIC BLOOD PRESSURE: 82 MMHG

## 2020-05-13 DIAGNOSIS — R00.2 PALPITATIONS: ICD-10-CM

## 2020-05-13 DIAGNOSIS — G47.33 OSA (OBSTRUCTIVE SLEEP APNEA): Primary | ICD-10-CM

## 2020-05-13 LAB
BH CV ECHO MEAS - AO MAX PG (FULL): 1.5 MMHG
BH CV ECHO MEAS - AO MAX PG: 5.9 MMHG
BH CV ECHO MEAS - AO MEAN PG (FULL): 0.83 MMHG
BH CV ECHO MEAS - AO MEAN PG: 2.9 MMHG
BH CV ECHO MEAS - AO ROOT AREA (BSA CORRECTED): 1.3
BH CV ECHO MEAS - AO ROOT AREA: 8.3 CM^2
BH CV ECHO MEAS - AO ROOT DIAM: 3.2 CM
BH CV ECHO MEAS - AO V2 MAX: 121.6 CM/SEC
BH CV ECHO MEAS - AO V2 MEAN: 78.7 CM/SEC
BH CV ECHO MEAS - AO V2 VTI: 26.9 CM
BH CV ECHO MEAS - AVA(I,A): 2.4 CM^2
BH CV ECHO MEAS - AVA(I,D): 2.4 CM^2
BH CV ECHO MEAS - AVA(V,A): 2.7 CM^2
BH CV ECHO MEAS - AVA(V,D): 2.7 CM^2
BH CV ECHO MEAS - BSA(HAYCOCK): 2.6 M^2
BH CV ECHO MEAS - BSA: 2.4 M^2
BH CV ECHO MEAS - BZI_BMI: 42.8 KILOGRAMS/M^2
BH CV ECHO MEAS - BZI_METRIC_HEIGHT: 175.3 CM
BH CV ECHO MEAS - BZI_METRIC_WEIGHT: 131.5 KG
BH CV ECHO MEAS - EDV(CUBED): 86.8 ML
BH CV ECHO MEAS - EDV(MOD-SP2): 85 ML
BH CV ECHO MEAS - EDV(MOD-SP4): 112 ML
BH CV ECHO MEAS - EDV(TEICH): 89 ML
BH CV ECHO MEAS - EF(CUBED): 66.7 %
BH CV ECHO MEAS - EF(MOD-BP): 57 %
BH CV ECHO MEAS - EF(MOD-SP2): 69.4 %
BH CV ECHO MEAS - EF(MOD-SP4): 61.6 %
BH CV ECHO MEAS - EF(TEICH): 58.4 %
BH CV ECHO MEAS - ESV(CUBED): 28.9 ML
BH CV ECHO MEAS - ESV(MOD-SP2): 26 ML
BH CV ECHO MEAS - ESV(MOD-SP4): 43 ML
BH CV ECHO MEAS - ESV(TEICH): 37 ML
BH CV ECHO MEAS - FS: 30.7 %
BH CV ECHO MEAS - IVS/LVPW: 1.1
BH CV ECHO MEAS - IVSD: 1 CM
BH CV ECHO MEAS - LA DIMENSION: 3.4 CM
BH CV ECHO MEAS - LA/AO: 1
BH CV ECHO MEAS - LAD MAJOR: 5.3 CM
BH CV ECHO MEAS - LAT PEAK E' VEL: 12 CM/SEC
BH CV ECHO MEAS - LATERAL E/E' RATIO: 6
BH CV ECHO MEAS - LV DIASTOLIC VOL/BSA (35-75): 46.3 ML/M^2
BH CV ECHO MEAS - LV MASS(C)D: 140.4 GRAMS
BH CV ECHO MEAS - LV MASS(C)DI: 58 GRAMS/M^2
BH CV ECHO MEAS - LV MAX PG: 4.5 MMHG
BH CV ECHO MEAS - LV MEAN PG: 2.1 MMHG
BH CV ECHO MEAS - LV SYSTOLIC VOL/BSA (12-30): 17.8 ML/M^2
BH CV ECHO MEAS - LV V1 MAX: 105.6 CM/SEC
BH CV ECHO MEAS - LV V1 MEAN: 65.8 CM/SEC
BH CV ECHO MEAS - LV V1 VTI: 20.9 CM
BH CV ECHO MEAS - LVIDD: 4.4 CM
BH CV ECHO MEAS - LVIDS: 3.1 CM
BH CV ECHO MEAS - LVLD AP2: 8.1 CM
BH CV ECHO MEAS - LVLD AP4: 8.4 CM
BH CV ECHO MEAS - LVLS AP2: 6 CM
BH CV ECHO MEAS - LVLS AP4: 7.8 CM
BH CV ECHO MEAS - LVOT AREA (M): 3.1 CM^2
BH CV ECHO MEAS - LVOT AREA: 3.1 CM^2
BH CV ECHO MEAS - LVOT DIAM: 2 CM
BH CV ECHO MEAS - LVPWD: 0.9 CM
BH CV ECHO MEAS - MED PEAK E' VEL: 9.2 CM/SEC
BH CV ECHO MEAS - MEDIAL E/E' RATIO: 7.8
BH CV ECHO MEAS - MV A MAX VEL: 54.8 CM/SEC
BH CV ECHO MEAS - MV DEC TIME: 0.21 SEC
BH CV ECHO MEAS - MV E MAX VEL: 73.1 CM/SEC
BH CV ECHO MEAS - MV E/A: 1.3
BH CV ECHO MEAS - PA ACC SLOPE: 310.7 CM/SEC^2
BH CV ECHO MEAS - PA ACC TIME: 0.21 SEC
BH CV ECHO MEAS - PA MAX PG: 3.7 MMHG
BH CV ECHO MEAS - PA PR(ACCEL): -16.1 MMHG
BH CV ECHO MEAS - PA V2 MAX: 96.6 CM/SEC
BH CV ECHO MEAS - SI(AO): 91.8 ML/M^2
BH CV ECHO MEAS - SI(CUBED): 23.9 ML/M^2
BH CV ECHO MEAS - SI(LVOT): 26.8 ML/M^2
BH CV ECHO MEAS - SI(MOD-SP2): 24.4 ML/M^2
BH CV ECHO MEAS - SI(MOD-SP4): 28.5 ML/M^2
BH CV ECHO MEAS - SI(TEICH): 21.5 ML/M^2
BH CV ECHO MEAS - SV(AO): 222.1 ML
BH CV ECHO MEAS - SV(CUBED): 57.9 ML
BH CV ECHO MEAS - SV(LVOT): 64.8 ML
BH CV ECHO MEAS - SV(MOD-SP2): 59 ML
BH CV ECHO MEAS - SV(MOD-SP4): 69 ML
BH CV ECHO MEAS - SV(TEICH): 52 ML
BH CV ECHO MEAS - TAPSE (>1.6): 2.6 CM2
BH CV ECHO MEASUREMENTS AVERAGE E/E' RATIO: 6.9
BH CV VAS BP RIGHT ARM: NORMAL MMHG
BH CV XLRA - RV BASE: 4.9 CM
BH CV XLRA - RV LENGTH: 4.4 CM
BH CV XLRA - RV MID: 1.7 CM
BH CV XLRA - TDI S': 11.5 CM/SEC
LEFT ATRIUM VOLUME INDEX: 22.7 ML/M^2
LEFT ATRIUM VOLUME: 55 ML
MAXIMAL PREDICTED HEART RATE: 173 BPM
STRESS TARGET HR: 147 BPM

## 2020-05-13 PROCEDURE — 93306 TTE W/DOPPLER COMPLETE: CPT

## 2020-05-13 PROCEDURE — 93306 TTE W/DOPPLER COMPLETE: CPT | Performed by: INTERNAL MEDICINE

## 2020-05-13 NOTE — TELEPHONE ENCOUNTER
----- Message from Alan Woodson MD sent at 5/13/2020  1:13 PM EDT -----  I was able to review this patient's echo and Holter monitor.  No major abnormalities on these.  I discussed the findings with him.    Could we send a letter to his orthopedist saying that it is okay to proceed with his surgery at a low risk of cardiac complications.    I also told the patient he did not have to keep his appointment with me on this Friday that I was okay sending clearance recommendations to the surgeon.  Thanks

## 2020-07-23 ENCOUNTER — TELEPHONE (OUTPATIENT)
Dept: INTERNAL MEDICINE | Facility: CLINIC | Age: 45
End: 2020-07-23

## 2020-07-28 ENCOUNTER — OFFICE VISIT (OUTPATIENT)
Dept: INTERNAL MEDICINE | Facility: CLINIC | Age: 45
End: 2020-07-28

## 2020-07-28 VITALS
HEART RATE: 96 BPM | WEIGHT: 308 LBS | DIASTOLIC BLOOD PRESSURE: 72 MMHG | BODY MASS INDEX: 45.62 KG/M2 | SYSTOLIC BLOOD PRESSURE: 120 MMHG | TEMPERATURE: 97.1 F | HEIGHT: 69 IN

## 2020-07-28 DIAGNOSIS — J30.9 ALLERGIC RHINITIS, UNSPECIFIED SEASONALITY, UNSPECIFIED TRIGGER: Primary | ICD-10-CM

## 2020-07-28 PROCEDURE — 99213 OFFICE O/P EST LOW 20 MIN: CPT | Performed by: NURSE PRACTITIONER

## 2020-07-28 RX ORDER — AMOXICILLIN AND CLAVULANATE POTASSIUM 875; 125 MG/1; MG/1
1 TABLET, FILM COATED ORAL 2 TIMES DAILY
Qty: 14 TABLET | Refills: 0 | Status: SHIPPED | OUTPATIENT
Start: 2020-07-28 | End: 2021-02-24

## 2020-07-28 RX ORDER — FLUTICASONE PROPIONATE 50 MCG
2 SPRAY, SUSPENSION (ML) NASAL DAILY
Qty: 1 BOTTLE | Refills: 2 | Status: SHIPPED | OUTPATIENT
Start: 2020-07-28 | End: 2022-04-15

## 2020-07-28 NOTE — PATIENT INSTRUCTIONS
Likely allergy.  Start Flonase as directed.  Okay to continue Claritin.  If worse symptoms or no improvement as we discussed start antibiotic.  Make sure you are getting plenty of water

## 2020-07-28 NOTE — PROGRESS NOTES
Sumanth Alfaro  1975  [id number]  Patient Care Team:  Austyn Carl MD as PCP - General (Internal Medicine)    Sumanth Alfaro is a pleasant 45 y.o. male who presents for evaluation of Sinus Problem (sneezing, congestion )    Chief Complaint   Patient presents with   • Sinus Problem     sneezing, congestion        HPI:   Nasal congestion and thick drainage, no cough or sore throat, sneezing.  Tried claritin which helps some. No fever.  Has been outdoors more during pandemic.  Past Medical History:   Diagnosis Date   • Abdominal pain    • Acute cholecystitis    • Carpal tunnel syndrome     1/2020 had staph infection of right wrist after surgery   • GERD (gastroesophageal reflux disease)    • Headache     Dr Clement for headaches-referred to ENT   • Hypertension    • Kidney stone     per Navas   • Left shoulder pain    • Nephrolithiasis    • Sphincter of Oddi dysfunction      Past Surgical History:   Procedure Laterality Date   • CARPAL TUNNEL RELEASE Bilateral 2003   • CARPAL TUNNEL RELEASE Right 01/2020   • CARPAL TUNNEL RELEASE Left 11/2019   • CHOLECYSTECTOMY  2010    laparoscopic-per DR Adams    • COLONOSCOPY  11/2009    mild ulcer DR Hale   • SHOULDER ARTHROSCOPY  2009    DR Montana@   • SINUS SURGERY  2018    Dr Salmon did sinus procedure cleaning out sinuses     Family History   Problem Relation Age of Onset   • Obesity Father    • Coronary artery disease Father    • Hypertension Father    • Diabetes Father    • Hyperlipidemia Father    • Cancer Other         barthrolin gland   • Obesity Other      Social History     Tobacco Use   Smoking Status Never Smoker   Smokeless Tobacco Never Used     Allergies   Allergen Reactions   • Sulfa Antibiotics Rash       Current Outpatient Medications:   •  levETIRAcetam (KEPPRA) 500 MG tablet, Take 500 mg by mouth Daily., Disp: , Rfl:   •  lisinopril (PRINIVIL,ZESTRIL) 20 MG tablet, Take 1 tablet by mouth Daily., Disp: 90 tablet, Rfl: 3  •   "omeprazole (priLOSEC) 20 MG capsule, Take 1 capsule by mouth Daily., Disp: 90 capsule, Rfl: 3  •  Potassium Citrate ER (UROCIT-K 15) 15 MEQ (1620 MG) tablet controlled-release, Take 15 mEq by mouth Every 12 (Twelve) Hours., Disp: 90 tablet, Rfl: 3  •  tamsulosin (FLOMAX) 0.4 MG capsule 24 hr capsule, Take 1 capsule by mouth Daily. following the same meal each day, Disp: 90 capsule, Rfl: 3  •  venlafaxine XR (EFFEXOR-XR) 75 MG 24 hr capsule, TAKE 1 CAPSULE BY MOUTH ONCE DAILY WITH FOOD, Disp: 90 capsule, Rfl: 1  •  amoxicillin-clavulanate (Augmentin) 875-125 MG per tablet, Take 1 tablet by mouth 2 (Two) Times a Day., Disp: 14 tablet, Rfl: 0  •  fluticasone (Flonase) 50 MCG/ACT nasal spray, 2 sprays into the nostril(s) as directed by provider Daily., Disp: 1 bottle, Rfl: 2    Review of Systems   Constitutional: Negative for chills, fatigue and fever.   HENT: Positive for congestion and sinus pressure. Negative for ear pain.    Respiratory: Negative for cough, chest tightness, shortness of breath and wheezing.    Cardiovascular: Negative for chest pain and palpitations.   Gastrointestinal: Negative for abdominal pain, blood in stool and constipation.   Skin: Negative for color change.   Allergic/Immunologic: Positive for environmental allergies.   Neurological: Negative for dizziness, speech difficulty and headache.   Psychiatric/Behavioral: Negative for decreased concentration. The patient is not nervous/anxious.      /72 (BP Location: Left arm, Patient Position: Sitting, Cuff Size: Large Adult)   Pulse 96   Temp 97.1 °F (36.2 °C) (Temporal)   Ht 175.3 cm (69.02\")   Wt (!) 140 kg (308 lb)   BMI 45.46 kg/m²     Physical Exam   Constitutional: He appears well-developed and well-nourished.   HENT:   Head: Normocephalic and atraumatic.   Right Ear: External ear normal.   Left Ear: External ear normal.   **wearing mask   Eyes: Conjunctivae and EOM are normal.   Neck: Normal range of motion. Neck supple. "   Cardiovascular: Normal rate, regular rhythm and normal heart sounds.   Pulmonary/Chest: Effort normal and breath sounds normal.   Abdominal: Soft. Bowel sounds are normal.   Musculoskeletal: Normal range of motion.   Neurological: He is alert.   Skin: Skin is warm and dry.   Psychiatric: He has a normal mood and affect. His behavior is normal. Thought content normal.       Results Review:  None    Assessment/Plan:  Sumanth was seen today for sinus problem.    Diagnoses and all orders for this visit:    Allergic rhinitis, unspecified seasonality, unspecified trigger    Other orders  -     amoxicillin-clavulanate (Augmentin) 875-125 MG per tablet; Take 1 tablet by mouth 2 (Two) Times a Day.  -     fluticasone (Flonase) 50 MCG/ACT nasal spray; 2 sprays into the nostril(s) as directed by provider Daily.       Patient Instructions   Likely allergy.  Start Flonase as directed.  Okay to continue Claritin.  If worse symptoms or no improvement as we discussed start antibiotic.  Make sure you are getting plenty of water    Plan of care reviewed with patient at the conclusion of today's visit. Education was provided regarding diagnosis, management and any prescribed or recommended OTC medications.  Patient verbalizes understanding of and agreement with management plan.    No follow-ups on file.    *Note that portions of this note were completed with a voice recognition program.  Efforts were made to edit the dictation but occasionally words are transcribed.    CARMEL Jama

## 2020-08-31 RX ORDER — VENLAFAXINE HYDROCHLORIDE 75 MG/1
CAPSULE, EXTENDED RELEASE ORAL
Qty: 90 CAPSULE | Refills: 1 | Status: SHIPPED | OUTPATIENT
Start: 2020-08-31 | End: 2021-02-24 | Stop reason: SDUPTHER

## 2020-11-16 RX ORDER — LISINOPRIL 20 MG/1
TABLET ORAL
Qty: 90 TABLET | Refills: 0 | Status: SHIPPED | OUTPATIENT
Start: 2020-11-16 | End: 2021-02-17

## 2020-11-16 RX ORDER — TAMSULOSIN HYDROCHLORIDE 0.4 MG/1
CAPSULE ORAL
Qty: 90 CAPSULE | Refills: 0 | Status: SHIPPED | OUTPATIENT
Start: 2020-11-16 | End: 2021-02-17

## 2021-02-17 RX ORDER — LISINOPRIL 20 MG/1
TABLET ORAL
Qty: 30 TABLET | Refills: 0 | Status: SHIPPED | OUTPATIENT
Start: 2021-02-17 | End: 2021-02-24 | Stop reason: SDUPTHER

## 2021-02-17 RX ORDER — TAMSULOSIN HYDROCHLORIDE 0.4 MG/1
CAPSULE ORAL
Qty: 90 CAPSULE | Refills: 0 | Status: SHIPPED | OUTPATIENT
Start: 2021-02-17 | End: 2021-05-20

## 2021-02-24 ENCOUNTER — OFFICE VISIT (OUTPATIENT)
Dept: INTERNAL MEDICINE | Facility: CLINIC | Age: 46
End: 2021-02-24

## 2021-02-24 ENCOUNTER — LAB (OUTPATIENT)
Dept: LAB | Facility: HOSPITAL | Age: 46
End: 2021-02-24

## 2021-02-24 VITALS
SYSTOLIC BLOOD PRESSURE: 118 MMHG | DIASTOLIC BLOOD PRESSURE: 78 MMHG | WEIGHT: 300 LBS | BODY MASS INDEX: 44.43 KG/M2 | HEIGHT: 69 IN | TEMPERATURE: 96.8 F | HEART RATE: 81 BPM

## 2021-02-24 DIAGNOSIS — R06.83 SNORES: ICD-10-CM

## 2021-02-24 DIAGNOSIS — I10 HYPERTENSION, BENIGN: ICD-10-CM

## 2021-02-24 DIAGNOSIS — Z13.21 ENCOUNTER FOR VITAMIN DEFICIENCY SCREENING: ICD-10-CM

## 2021-02-24 DIAGNOSIS — Z12.5 SCREENING PSA (PROSTATE SPECIFIC ANTIGEN): ICD-10-CM

## 2021-02-24 DIAGNOSIS — I51.7: ICD-10-CM

## 2021-02-24 DIAGNOSIS — E78.5 HYPERLIPIDEMIA LDL GOAL <100: ICD-10-CM

## 2021-02-24 DIAGNOSIS — E66.01 MORBID OBESITY DUE TO EXCESS CALORIES (HCC): ICD-10-CM

## 2021-02-24 DIAGNOSIS — N20.0 RENAL STONE: ICD-10-CM

## 2021-02-24 DIAGNOSIS — Z11.59 ENCOUNTER FOR HEPATITIS C SCREENING TEST FOR LOW RISK PATIENT: ICD-10-CM

## 2021-02-24 DIAGNOSIS — R39.15 BENIGN PROSTATIC HYPERPLASIA (BPH) WITH URINARY URGENCY: ICD-10-CM

## 2021-02-24 DIAGNOSIS — F33.0 MILD EPISODE OF RECURRENT MAJOR DEPRESSIVE DISORDER (HCC): ICD-10-CM

## 2021-02-24 DIAGNOSIS — R73.01 IMPAIRED FASTING GLUCOSE: ICD-10-CM

## 2021-02-24 DIAGNOSIS — Z53.20 SCREENING FOR HEPATITIS C DECLINED: ICD-10-CM

## 2021-02-24 DIAGNOSIS — N40.1 BENIGN PROSTATIC HYPERPLASIA (BPH) WITH URINARY URGENCY: ICD-10-CM

## 2021-02-24 DIAGNOSIS — Z00.00 PREVENTATIVE HEALTH CARE: Primary | ICD-10-CM

## 2021-02-24 PROBLEM — E55.9 VITAMIN D DEFICIENCY: Status: ACTIVE | Noted: 2021-02-24

## 2021-02-24 PROBLEM — E11.69 TYPE 2 DIABETES MELLITUS WITH MORBID OBESITY (HCC): Status: ACTIVE | Noted: 2021-02-24

## 2021-02-24 LAB
25(OH)D3 SERPL-MCNC: 22.6 NG/ML (ref 30–100)
ALBUMIN SERPL-MCNC: 4.1 G/DL (ref 3.5–5.2)
ALBUMIN UR-MCNC: <1.2 MG/DL
ALBUMIN/GLOB SERPL: 1.1 G/DL
ALP SERPL-CCNC: 74 U/L (ref 39–117)
ALT SERPL W P-5'-P-CCNC: 67 U/L (ref 1–41)
ANION GAP SERPL CALCULATED.3IONS-SCNC: 8.4 MMOL/L (ref 5–15)
AST SERPL-CCNC: 47 U/L (ref 1–40)
BASOPHILS # BLD AUTO: 0.07 10*3/MM3 (ref 0–0.2)
BASOPHILS NFR BLD AUTO: 0.7 % (ref 0–1.5)
BILIRUB SERPL-MCNC: 0.5 MG/DL (ref 0–1.2)
BUN SERPL-MCNC: 11 MG/DL (ref 6–20)
BUN/CREAT SERPL: 12.5 (ref 7–25)
CALCIUM SPEC-SCNC: 9.2 MG/DL (ref 8.6–10.5)
CHLORIDE SERPL-SCNC: 101 MMOL/L (ref 98–107)
CHOLEST SERPL-MCNC: 151 MG/DL (ref 0–200)
CO2 SERPL-SCNC: 28.6 MMOL/L (ref 22–29)
CREAT SERPL-MCNC: 0.88 MG/DL (ref 0.76–1.27)
CREAT UR-MCNC: 166.9 MG/DL
DEPRECATED RDW RBC AUTO: 42.7 FL (ref 37–54)
EOSINOPHIL # BLD AUTO: 0.32 10*3/MM3 (ref 0–0.4)
EOSINOPHIL NFR BLD AUTO: 3.3 % (ref 0.3–6.2)
ERYTHROCYTE [DISTWIDTH] IN BLOOD BY AUTOMATED COUNT: 14.4 % (ref 12.3–15.4)
GFR SERPL CREATININE-BSD FRML MDRD: 93 ML/MIN/1.73
GLOBULIN UR ELPH-MCNC: 3.7 GM/DL
GLUCOSE SERPL-MCNC: 83 MG/DL (ref 65–99)
HBA1C MFR BLD: 6.63 % (ref 4.8–5.6)
HCT VFR BLD AUTO: 41.2 % (ref 37.5–51)
HCV AB SER DONR QL: NORMAL
HDLC SERPL-MCNC: 40 MG/DL (ref 40–60)
HGB BLD-MCNC: 13.7 G/DL (ref 13–17.7)
IMM GRANULOCYTES # BLD AUTO: 0.03 10*3/MM3 (ref 0–0.05)
IMM GRANULOCYTES NFR BLD AUTO: 0.3 % (ref 0–0.5)
LDLC SERPL CALC-MCNC: 93 MG/DL (ref 0–100)
LDLC/HDLC SERPL: 2.28 {RATIO}
LYMPHOCYTES # BLD AUTO: 3.62 10*3/MM3 (ref 0.7–3.1)
LYMPHOCYTES NFR BLD AUTO: 36.9 % (ref 19.6–45.3)
MCH RBC QN AUTO: 27.2 PG (ref 26.6–33)
MCHC RBC AUTO-ENTMCNC: 33.3 G/DL (ref 31.5–35.7)
MCV RBC AUTO: 81.9 FL (ref 79–97)
MICROALBUMIN/CREAT UR: NORMAL MG/G{CREAT}
MONOCYTES # BLD AUTO: 0.59 10*3/MM3 (ref 0.1–0.9)
MONOCYTES NFR BLD AUTO: 6 % (ref 5–12)
NEUTROPHILS NFR BLD AUTO: 5.19 10*3/MM3 (ref 1.7–7)
NEUTROPHILS NFR BLD AUTO: 52.8 % (ref 42.7–76)
NRBC BLD AUTO-RTO: 0 /100 WBC (ref 0–0.2)
PLATELET # BLD AUTO: 348 10*3/MM3 (ref 140–450)
PMV BLD AUTO: 9 FL (ref 6–12)
POTASSIUM SERPL-SCNC: 4.4 MMOL/L (ref 3.5–5.2)
PROT SERPL-MCNC: 7.8 G/DL (ref 6–8.5)
PSA SERPL-MCNC: 0.62 NG/ML (ref 0–4)
RBC # BLD AUTO: 5.03 10*6/MM3 (ref 4.14–5.8)
SODIUM SERPL-SCNC: 138 MMOL/L (ref 136–145)
TRIGL SERPL-MCNC: 99 MG/DL (ref 0–150)
TSH SERPL DL<=0.05 MIU/L-ACNC: 2.08 UIU/ML (ref 0.27–4.2)
VIT B12 BLD-MCNC: 493 PG/ML (ref 211–946)
VLDLC SERPL-MCNC: 18 MG/DL (ref 5–40)
WBC # BLD AUTO: 9.82 10*3/MM3 (ref 3.4–10.8)

## 2021-02-24 PROCEDURE — 86803 HEPATITIS C AB TEST: CPT

## 2021-02-24 PROCEDURE — 82306 VITAMIN D 25 HYDROXY: CPT

## 2021-02-24 PROCEDURE — 99396 PREV VISIT EST AGE 40-64: CPT | Performed by: INTERNAL MEDICINE

## 2021-02-24 PROCEDURE — G0103 PSA SCREENING: HCPCS

## 2021-02-24 PROCEDURE — 83036 HEMOGLOBIN GLYCOSYLATED A1C: CPT

## 2021-02-24 PROCEDURE — 84443 ASSAY THYROID STIM HORMONE: CPT

## 2021-02-24 PROCEDURE — 80061 LIPID PANEL: CPT

## 2021-02-24 PROCEDURE — 85025 COMPLETE CBC W/AUTO DIFF WBC: CPT

## 2021-02-24 PROCEDURE — 80053 COMPREHEN METABOLIC PANEL: CPT

## 2021-02-24 PROCEDURE — 82043 UR ALBUMIN QUANTITATIVE: CPT

## 2021-02-24 PROCEDURE — 82570 ASSAY OF URINE CREATININE: CPT

## 2021-02-24 PROCEDURE — 82607 VITAMIN B-12: CPT

## 2021-02-24 RX ORDER — VENLAFAXINE HYDROCHLORIDE 75 MG/1
75 CAPSULE, EXTENDED RELEASE ORAL DAILY
Qty: 90 CAPSULE | Refills: 1 | Status: SHIPPED | OUTPATIENT
Start: 2021-02-24 | End: 2021-08-23

## 2021-02-24 RX ORDER — ACETAMINOPHEN 160 MG
2000 TABLET,DISINTEGRATING ORAL DAILY
Qty: 30 CAPSULE | Refills: 11
Start: 2021-02-24

## 2021-02-24 RX ORDER — LISINOPRIL 20 MG/1
20 TABLET ORAL DAILY
Qty: 90 TABLET | Refills: 3 | Status: SHIPPED | OUTPATIENT
Start: 2021-02-24 | End: 2022-02-25 | Stop reason: SDUPTHER

## 2021-02-24 NOTE — ASSESSMENT & PLAN NOTE
Proceed with labs. His mood is good overall.He states he has normal pleasure and denies depressive symptoms on depressive screening.  Proceed with labs and follow with Urology. He had good skin survey 12/2020 with JORY Alfonso. Age-appropriate Counseling:  Discussed preventative medicine issues with patient including regular exercise, healthy diet, stress reduction, adequate sleep and recommended age-appropriate screening studies.  Immunizations reviewed.

## 2021-02-24 NOTE — ASSESSMENT & PLAN NOTE
Patient's depression is recurrent and is mild without psychosis. Their depression is currently active and the condition is improving with treatment. This will be reassessed at the next regular appointment. F/U as described:patient was prescribed an antidepressant medicine.

## 2021-02-24 NOTE — ASSESSMENT & PLAN NOTE
Patient's (Body mass index is 44.28 kg/m².) indicates that they are morbidly obese (BMI > 40 or > 35 with obesity - related health condition) with obesity-related health conditions that include obstructive sleep apnea and hypertension . Obesity is improving with lifestyle modifications. BMI is is above average; BMI management plan is completed. We discussed portion control and increasing exercise.

## 2021-02-24 NOTE — PROGRESS NOTES
Chief Complaint   Patient presents with   • Annual Exam     fasting for labs   • Med Refill       History of Present Illness  45 y.o. white male presents for wellness exam.     Review of Systems   Constitutional: Negative for chills, diaphoresis and fever.   HENT: Negative for sore throat.    Eyes: Negative for visual disturbance.   Respiratory: Negative for cough and shortness of breath.    Cardiovascular: Negative for chest pain and palpitations.   Gastrointestinal: Negative for abdominal pain and constipation.   Musculoskeletal: Negative for gait problem.   Skin: Negative for rash.   Neurological: Negative for dizziness and headaches.   Hematological: Negative for adenopathy.   Psychiatric/Behavioral: Positive for sleep disturbance. Negative for dysphoric mood. The patient is not nervous/anxious.      .    PMSFH:  The following portions of the patient's history were reviewed and updated as appropriate: allergies, current medications, past family history, past medical history, past social history, past surgical history and problem list.      Current Outpatient Medications:   •  fluticasone (Flonase) 50 MCG/ACT nasal spray, 2 sprays into the nostril(s) as directed by provider Daily., Disp: 1 bottle, Rfl: 2  •  levETIRAcetam (KEPPRA) 500 MG tablet, Take 500 mg by mouth Daily., Disp: , Rfl:   •  lisinopril (PRINIVIL,ZESTRIL) 20 MG tablet, Take 1 tablet by mouth Daily., Disp: 90 tablet, Rfl: 3  •  omeprazole (priLOSEC) 20 MG capsule, Take 1 capsule by mouth Daily., Disp: 90 capsule, Rfl: 3  •  Potassium Citrate ER (UROCIT-K 15) 15 MEQ (1620 MG) tablet controlled-release, Take 15 mEq by mouth Every 12 (Twelve) Hours., Disp: 90 tablet, Rfl: 3  •  tamsulosin (FLOMAX) 0.4 MG capsule 24 hr capsule, TAKE 1 CAPSULE BY MOUTH ONCE DAILY FOLOWING SAME MEAL DAILY, Disp: 90 capsule, Rfl: 0  •  venlafaxine XR (EFFEXOR-XR) 75 MG 24 hr capsule, Take 1 capsule by mouth Daily. with food, Disp: 90 capsule, Rfl: 1    VITALS:  /78   " Pulse 81   Temp 96.8 °F (36 °C)   Ht 175.3 cm (69.02\")   Wt 136 kg (300 lb)   BMI 44.28 kg/m²     Physical Exam  Vitals signs and nursing note reviewed.   Constitutional:       Appearance: Normal appearance. He is well-developed.   HENT:      Head: Normocephalic.      Right Ear: Tympanic membrane normal.      Left Ear: Tympanic membrane normal.   Eyes:      Extraocular Movements: Extraocular movements intact.      Conjunctiva/sclera: Conjunctivae normal.   Cardiovascular:      Rate and Rhythm: Normal rate and regular rhythm.      Pulses:           Dorsalis pedis pulses are 2+ on the right side and 2+ on the left side.      Heart sounds: Normal heart sounds.   Pulmonary:      Effort: Pulmonary effort is normal. No respiratory distress.      Breath sounds: Normal breath sounds.   Abdominal:      General: Bowel sounds are normal.      Palpations: Abdomen is soft.      Tenderness: There is no abdominal tenderness.      Comments: Obese    Musculoskeletal:         General: No tenderness.   Feet:      Right foot:      Skin integrity: Skin integrity normal. No ulcer or blister.      Left foot:      Skin integrity: Skin integrity normal. No ulcer or blister.      Comments: Diabetic Foot Exam Performed  Skin:     General: Skin is warm and dry.   Neurological:      General: No focal deficit present.      Mental Status: He is alert and oriented to person, place, and time.   Psychiatric:         Mood and Affect: Mood normal.         Behavior: Behavior normal.         LABS:  Results for orders placed or performed during the hospital encounter of 05/13/20   Adult Transthoracic Echo Complete W/ Cont if Necessary Per Protocol   Result Value Ref Range    BSA 2.4 m^2    IVSd 1.0 cm    LVIDd 4.4 cm    LVIDs 3.1 cm    LVPWd 0.9 cm    IVS/LVPW 1.1     FS 30.7 %    EDV(Teich) 89.0 ml    ESV(Teich) 37.0 ml    EF(Teich) 58.4 %    EDV(cubed) 86.8 ml    ESV(cubed) 28.9 ml    EF(cubed) 66.7 %    LV mass(C)d 140.4 grams    LV mass(C)dI " 58.0 grams/m^2    SV(Teich) 52.0 ml    SI(Teich) 21.5 ml/m^2    SV(cubed) 57.9 ml    SI(cubed) 23.9 ml/m^2    Ao root diam 3.2 cm    Ao root area 8.3 cm^2    LA dimension 3.4 cm    LA/Ao 1.0     LVOT diam 2.0 cm    LVOT area 3.1 cm^2    LVOT area(traced) 3.1 cm^2    LAd major 5.3 cm    LVLd ap4 8.4 cm    EDV(MOD-sp4) 112.0 ml    LVLs ap4 7.8 cm    ESV(MOD-sp4) 43.0 ml    EF(MOD-sp4) 61.6 %    LVLd ap2 8.1 cm    EDV(MOD-sp2) 85.0 ml    LVLs ap2 6.0 cm    ESV(MOD-sp2) 26.0 ml    EF(MOD-sp2) 69.4 %    LA volume 55.0 ml    SV(MOD-sp4) 69.0 ml    SI(MOD-sp4) 28.5 ml/m^2    SV(MOD-sp2) 59.0 ml    SI(MOD-sp2) 24.4 ml/m^2    Ao root area (BSA corrected) 1.3     LV Mcgraw Vol (BSA corrected) 46.3 ml/m^2    LV Sys Vol (BSA corrected) 17.8 ml/m^2    LA Volume Index 22.7 ml/m^2    MV E max marvin 73.1 cm/sec    MV A max marvin 54.8 cm/sec    MV E/A 1.3     MV dec time 0.21 sec    Ao pk marvin 121.6 cm/sec    Ao max PG 5.9 mmHg    Ao max PG (full) 1.5 mmHg    Ao V2 mean 78.7 cm/sec    Ao mean PG 2.9 mmHg    Ao mean PG (full) 0.83 mmHg    Ao V2 VTI 26.9 cm    LEANDRO(I,A) 2.4 cm^2    LEANDRO(I,D) 2.4 cm^2    LEANDRO(V,A) 2.7 cm^2    LEANDRO(V,D) 2.7 cm^2    LV V1 max PG 4.5 mmHg    LV V1 mean PG 2.1 mmHg    LV V1 max 105.6 cm/sec    LV V1 mean 65.8 cm/sec    LV V1 VTI 20.9 cm    SV(Ao) 222.1 ml    SI(Ao) 91.8 ml/m^2    SV(LVOT) 64.8 ml    SI(LVOT) 26.8 ml/m^2    PA V2 max 96.6 cm/sec    PA max PG 3.7 mmHg    PA acc slope 310.7 cm/sec^2    PA acc time 0.21 sec    PA pr(Accel) -16.1 mmHg    Lat E/e'  6.0     Med E/e' 7.8     Lat Peak E' Marvin 12.0 cm/sec    Med Peak E' Marvin 9.2 cm/sec    BH CV ECHO JACKY - BZI_BMI 42.8 kilograms/m^2     CV ECHO JACKY - BSA(HAYCOCK) 2.6 m^2     CV ECHO JACKY - BZI_METRIC_WEIGHT 131.5 kg     CV ECHO JACKY - BZI_METRIC_HEIGHT 175.3 cm    Avg E/e' ratio 6.90      CV VAS BP RIGHT /82 mmHg    RV S' 11.50 cm/sec    RV Base 4.90 cm    RV Length 4.40 cm    RV Mid 1.70 cm    TAPSE (>1.6) 2.60 cm2    Target HR (85%) 147 bpm     Max. Pred. HR (100%) 173 bpm    EF(MOD-bp) 57 %       Procedures         ASSESSMENT/PLAN:  Problems Addressed this Visit        Cardiac and Vasculature    Hyperlipidemia LDL goal <100     Lipid abnormalities are unchanged.  Nutritional counseling was provided. and Pharmacotherapy as ordered.  Lipids will be reassessed in 6 months.         Relevant Orders    CBC & Differential    Comprehensive Metabolic Panel    TSH Rfx On Abnormal To Free T4    Hypertension, benign     Hypertension is improving with treatment.  Continue current treatment regimen.  Dietary sodium restriction.  Weight loss.  Regular aerobic exercise.  Blood pressure will be reassessed at the next regular appointment.         Relevant Medications    lisinopril (PRINIVIL,ZESTRIL) 20 MG tablet    Other Relevant Orders    Lipid Panel    Microalbumin / Creatinine Urine Ratio - Urine, Clean Catch       Endocrine and Metabolic    Impaired fasting glucose     Discussed decreasing bad carbohydrates, specifically sweets, breads, potatoes, corn and high caloric drinks (juices, sodas, sweet tea).  Also recommend increasing physical activity, ideally 150 minutes aerobic exercise weekly and resistance exercises 2-3x/week.         Relevant Orders    Hemoglobin A1c    Morbid obesity due to excess calories (CMS/[facility])     Patient's (Body mass index is 44.28 kg/m².) indicates that they are morbidly obese (BMI > 40 or > 35 with obesity - related health condition) with obesity-related health conditions that include obstructive sleep apnea and hypertension . Obesity is improving with lifestyle modifications. BMI is is above average; BMI management plan is completed. We discussed portion control and increasing exercise.             Genitourinary and Reproductive     Benign prostatic hyperplasia (BPH) with urinary urgency    Relevant Orders    Ambulatory Referral to Urology (Completed)    Renal stone    Relevant Orders    Ambulatory Referral to Urology (Completed)        Health Encounters    Preventative health care - Primary     Proceed with labs. His mood is good overall.He states he has normal pleasure and denies depressive symptoms on depressive screening.  Proceed with labs and follow with Urology. He had good skin survey 12/2020 with JORY Alfonso. Age-appropriate Counseling:  Discussed preventative medicine issues with patient including regular exercise, healthy diet, stress reduction, adequate sleep and recommended age-appropriate screening studies.  Immunizations reviewed.                 Mental Health    Mild episode of recurrent major depressive disorder (CMS/HCC)     Patient's depression is recurrent and is mild without psychosis. Their depression is currently active and the condition is improving with treatment. This will be reassessed at the next regular appointment. F/U as described:patient was prescribed an antidepressant medicine.         Relevant Medications    venlafaxine XR (EFFEXOR-XR) 75 MG 24 hr capsule      Other Visit Diagnoses     Snores        Relevant Orders    Ambulatory Referral to Sleep Medicine    Segmental dilation of right ventricle determined by ventriculography        Relevant Orders    Ambulatory Referral to Sleep Medicine    Screening for hepatitis C declined        Relevant Orders    Hepatitis C Antibody    Encounter for vitamin deficiency screening        Relevant Orders    Vitamin D 25 Hydroxy    Vitamin B12    Encounter for hepatitis C screening test for low risk patient        Screening PSA (prostate specific antigen)        Relevant Orders    PSA Screen      Diagnoses       Codes Comments    Preventative health care    -  Primary ICD-10-CM: Z00.00  ICD-9-CM: V70.0     Hypertension, benign     ICD-10-CM: I10  ICD-9-CM: 401.1     Hyperlipidemia LDL goal <100     ICD-10-CM: E78.5  ICD-9-CM: 272.4     Morbid obesity due to excess calories (CMS/HCC)     ICD-10-CM: E66.01  ICD-9-CM: 278.01     Impaired fasting glucose     ICD-10-CM:  R73.01  ICD-9-CM: 790.21     Mild episode of recurrent major depressive disorder (CMS/HCC)     ICD-10-CM: F33.0  ICD-9-CM: 296.31     Snores     ICD-10-CM: R06.83  ICD-9-CM: 786.09     Segmental dilation of right ventricle determined by ventriculography     ICD-10-CM: I51.7  ICD-9-CM: 429.3     Screening for hepatitis C declined     ICD-10-CM: Z53.20  ICD-9-CM: V64.2     Encounter for vitamin deficiency screening     ICD-10-CM: Z13.21  ICD-9-CM: V77.99     Encounter for hepatitis C screening test for low risk patient     ICD-10-CM: Z11.59  ICD-9-CM: V73.89     Screening PSA (prostate specific antigen)     ICD-10-CM: Z12.5  ICD-9-CM: V76.44     Benign prostatic hyperplasia (BPH) with urinary urgency     ICD-10-CM: N40.1, R39.15  ICD-9-CM: 600.01, 788.63     Renal stone     ICD-10-CM: N20.0  ICD-9-CM: 592.0           FOLLOW-UP:  Return in about 6 months (around 8/24/2021) for Recheck, Labs this visit.      Electronically signed by:    Austyn Carl MD

## 2021-02-25 ENCOUNTER — TELEPHONE (OUTPATIENT)
Dept: INTERNAL MEDICINE | Facility: CLINIC | Age: 46
End: 2021-02-25

## 2021-02-25 DIAGNOSIS — E11.69 TYPE 2 DIABETES MELLITUS WITH MORBID OBESITY (HCC): Primary | ICD-10-CM

## 2021-02-25 DIAGNOSIS — E66.01 TYPE 2 DIABETES MELLITUS WITH MORBID OBESITY (HCC): Primary | ICD-10-CM

## 2021-02-25 RX ORDER — SEMAGLUTIDE 1.34 MG/ML
INJECTION, SOLUTION SUBCUTANEOUS
Qty: 1 PEN | Refills: 0 | Status: SHIPPED | OUTPATIENT
Start: 2021-02-25 | End: 2021-03-01

## 2021-02-25 RX ORDER — SEMAGLUTIDE 1.34 MG/ML
1 INJECTION, SOLUTION SUBCUTANEOUS WEEKLY
Qty: 3 PEN | Refills: 11 | Status: SHIPPED | OUTPATIENT
Start: 2021-02-25 | End: 2021-08-24

## 2021-02-25 NOTE — TELEPHONE ENCOUNTER
----- Message from Austyn Carl MD sent at 2/24/2021 11:21 PM EST -----  Please read lab letter about the diabetes and ozempic and see if he would like to to start it

## 2021-03-01 ENCOUNTER — TELEPHONE (OUTPATIENT)
Dept: INTERNAL MEDICINE | Facility: CLINIC | Age: 46
End: 2021-03-01

## 2021-03-01 DIAGNOSIS — E11.69 TYPE 2 DIABETES MELLITUS WITH MORBID OBESITY (HCC): Primary | ICD-10-CM

## 2021-03-01 DIAGNOSIS — E66.01 TYPE 2 DIABETES MELLITUS WITH MORBID OBESITY (HCC): Primary | ICD-10-CM

## 2021-03-01 PROBLEM — R73.01 IMPAIRED FASTING GLUCOSE: Status: RESOLVED | Noted: 2019-11-08 | Resolved: 2021-03-01

## 2021-03-01 RX ORDER — DAPAGLIFLOZIN 10 MG/1
10 TABLET, FILM COATED ORAL DAILY
Qty: 30 TABLET | Refills: 11 | Status: SHIPPED | OUTPATIENT
Start: 2021-03-01 | End: 2021-04-29 | Stop reason: SDUPTHER

## 2021-03-01 NOTE — TELEPHONE ENCOUNTER
Caller: Sumanth Alfarolas    Relationship: Self    Best call back number: [phone]    What medications are you currently taking:   Current Outpatient Medications on File Prior to Visit   Medication Sig Dispense Refill   • Cholecalciferol (Vitamin D3) 50 MCG (2000 UT) capsule Take 1 capsule by mouth Daily. 30 capsule 11   • fluticasone (Flonase) 50 MCG/ACT nasal spray 2 sprays into the nostril(s) as directed by provider Daily. 1 bottle 2   • levETIRAcetam (KEPPRA) 500 MG tablet Take 500 mg by mouth Daily.     • lisinopril (PRINIVIL,ZESTRIL) 20 MG tablet Take 1 tablet by mouth Daily. 90 tablet 3   • omeprazole (priLOSEC) 20 MG capsule Take 1 capsule by mouth Daily. 90 capsule 3   • Potassium Citrate ER (UROCIT-K 15) 15 MEQ (1620 MG) tablet controlled-release Take 15 mEq by mouth Every 12 (Twelve) Hours. 90 tablet 3   • Semaglutide, 1 MG/DOSE, (Ozempic, 1 MG/DOSE,) 2 MG/1.5ML solution pen-injector Inject 1 mg under the skin into the appropriate area as directed 1 (One) Time Per Week. 3 pen 11   • Semaglutide,0.25 or 0.5MG/DOS, (Ozempic, 0.25 or 0.5 MG/DOSE,) 2 MG/1.5ML solution pen-injector Please use 0.25 weekly for 2weeks&then0.5 weekly for 2weeks 1 pen 0   • tamsulosin (FLOMAX) 0.4 MG capsule 24 hr capsule TAKE 1 CAPSULE BY MOUTH ONCE DAILY FOLOWING SAME MEAL DAILY 90 capsule 0   • venlafaxine XR (EFFEXOR-XR) 75 MG 24 hr capsule Take 1 capsule by mouth Daily. with food 90 capsule 1     No current facility-administered medications on file prior to visit.         When did you start taking these medications: NEW MEDICATION PRESCRIBED LAST WEEK    Which medication are you concerned about: OZEMPIC    Who prescribed you this medication: DR FINLEY    What are your concerns: PHARMACY TOLD PATIENT IT WAS NEEDING A PA    [facility]: [facility]

## 2021-03-02 NOTE — TELEPHONE ENCOUNTER
Pt notified to get on farxiga website and sign up for savings card. He will return call to let me know if it works or not.

## 2021-03-03 ENCOUNTER — TELEPHONE (OUTPATIENT)
Dept: INTERNAL MEDICINE | Facility: CLINIC | Age: 46
End: 2021-03-03

## 2021-03-03 NOTE — TELEPHONE ENCOUNTER
See below. Pt did get coupon card online and pharmacy stated it still needed PA. I did the PA and it was still denied.

## 2021-03-03 NOTE — TELEPHONE ENCOUNTER
PT NEEDS A PRESCRIPTION FOR THE [facility] MEDICATION SENT TO THE PHARMACY ON FILE.  PT IS AT THE PHARMACY NOW.

## 2021-03-05 NOTE — TELEPHONE ENCOUNTER
Spoke to Frida Arceo our new Rep (Jesus Manuel Gay is no longer our rep), she will stop by Monday morning.

## 2021-03-05 NOTE — TELEPHONE ENCOUNTER
Please have someone from [employer] or [employer] reach out to antonietaon Victor Hugo and have her get the medicine for him the pharm rep and up date him we are still trying

## 2021-03-08 ENCOUNTER — TELEPHONE (OUTPATIENT)
Dept: INTERNAL MEDICINE | Facility: CLINIC | Age: 46
End: 2021-03-08

## 2021-03-08 NOTE — TELEPHONE ENCOUNTER
Ozempic -we tried to submit a PA & looks like we were getting an error message with the plan.    Do we need assistance with this?

## 2021-03-09 ENCOUNTER — TELEPHONE (OUTPATIENT)
Dept: INTERNAL MEDICINE | Facility: CLINIC | Age: 46
End: 2021-03-09

## 2021-03-09 NOTE — TELEPHONE ENCOUNTER
Patient would like to know if his Farxiga is to be taken daily.  He can be reached at [phone]

## 2021-03-22 ENCOUNTER — TELEPHONE (OUTPATIENT)
Dept: INTERNAL MEDICINE | Facility: CLINIC | Age: 46
End: 2021-03-22

## 2021-03-22 RX ORDER — METFORMIN HYDROCHLORIDE 750 MG/1
750 TABLET, EXTENDED RELEASE ORAL
Qty: 30 TABLET | Refills: 5 | Status: SHIPPED | OUTPATIENT
Start: 2021-03-22 | End: 2021-08-24

## 2021-03-22 NOTE — TELEPHONE ENCOUNTER
Caller: Sumanth Alfaro    Relationship: Self    Best call back number: [phone]    Who are you requesting to speak with (clinical staff, provider,  specific staff member): CLINICAL STAFF    Do you require a callback: YES. PATIENT STATED HE RECEIVED A LETTER FROM HIS INSURANCE ABOUT HIS FARXIGA PRESCRIPTION THAT STATES HE HAS BEEN PRESCRIBED A STEP TWO MEDICATION AND HIS INSURANCE REQUIRES HE TRY A STEP ONE MEDICATION FIRST.

## 2021-03-30 RX ORDER — OMEPRAZOLE 20 MG/1
CAPSULE, DELAYED RELEASE ORAL
Qty: 90 CAPSULE | Refills: 0 | Status: SHIPPED | OUTPATIENT
Start: 2021-03-30 | End: 2021-06-29

## 2021-04-21 ENCOUNTER — TELEPHONE (OUTPATIENT)
Dept: INTERNAL MEDICINE | Facility: CLINIC | Age: 46
End: 2021-04-21

## 2021-04-21 NOTE — TELEPHONE ENCOUNTER
Caller: AlfaroSumanth camaralas    Relationship: Self    Best call back number: [phone]    What medications are you currently taking:   Current Outpatient Medications on File Prior to Visit   Medication Sig Dispense Refill   • Cholecalciferol (Vitamin D3) 50 MCG (2000 UT) capsule Take 1 capsule by mouth Daily. 30 capsule 11   • Dapagliflozin Propanediol (Farxiga) 10 MG tablet Take 10 mg by mouth Daily. 30 tablet 11   • fluticasone (Flonase) 50 MCG/ACT nasal spray 2 sprays into the nostril(s) as directed by provider Daily. 1 bottle 2   • levETIRAcetam (KEPPRA) 500 MG tablet Take 500 mg by mouth Daily.     • lisinopril (PRINIVIL,ZESTRIL) 20 MG tablet Take 1 tablet by mouth Daily. 90 tablet 3   • metFORMIN ER (GLUCOPHAGE-XR) 750 MG 24 hr tablet Take 1 tablet by mouth Daily With Breakfast. 30 tablet 5   • omeprazole (priLOSEC) 20 MG capsule Take 1 capsule by mouth once daily 90 capsule 0   • Potassium Citrate ER (UROCIT-K 15) 15 MEQ (1620 MG) tablet controlled-release Take 15 mEq by mouth Every 12 (Twelve) Hours. 90 tablet 3   • Semaglutide, 1 MG/DOSE, (Ozempic, 1 MG/DOSE,) 2 MG/1.5ML solution pen-injector Inject 1 mg under the skin into the appropriate area as directed 1 (One) Time Per Week. 3 pen 11   • tamsulosin (FLOMAX) 0.4 MG capsule 24 hr capsule TAKE 1 CAPSULE BY MOUTH ONCE DAILY FOLOWING SAME MEAL DAILY 90 capsule 0   • venlafaxine XR (EFFEXOR-XR) 75 MG 24 hr capsule Take 1 capsule by mouth Daily. with food 90 capsule 1     No current facility-administered medications on file prior to visit.        When did you start taking these medications:1 MONTH AGO    Which medication are you concerned about: METFORMIN    Who prescribed you this medication:DR FINLEY    What are your concerns: PATIENT CALLED IN STATED THE MEDICATION CAUSES HIS STOMACH TO STAY UPSET ALONG WITH DIARRHEA AND HE WOULD LIKE TO KNOW WHAT CAN BE DONE PLEASE ADVISE    How long have you been taking these medications: 1 MONTH    How long  have you had these concerns: AFTER THIRD DAY OF TAKING MEDICATION

## 2021-04-21 NOTE — TELEPHONE ENCOUNTER
Please clarify what meds he is actually taking for his diabetes? Ozempic? Farxiga ? Or just metformin and what does of metformin and if it is the extended release or not   I do not think what has been sent in he is actually taking

## 2021-04-21 NOTE — TELEPHONE ENCOUNTER
Patient states he only has the samples would like to know if you are going to send in Rx to pharmacy explained to patient that PCP is out of office this afternoon patients states he has enough to last him a couple of weeks and will wait until PCP returns patient verbalized understanding

## 2021-04-21 NOTE — TELEPHONE ENCOUNTER
Pt states he was taking metformin ER and farxiga but because the metformin ER upset his stomach he has only been taking the farxiga for the past week.

## 2021-04-22 NOTE — TELEPHONE ENCOUNTER
Please have someone reach out to pharmacy rep and get the [employer] card for patient and print off prescription for it and have him take to pharmacy

## 2021-04-27 ENCOUNTER — TELEMEDICINE (OUTPATIENT)
Dept: SLEEP MEDICINE | Facility: HOSPITAL | Age: 46
End: 2021-04-27

## 2021-04-27 DIAGNOSIS — E66.01 MORBID OBESITY DUE TO EXCESS CALORIES (HCC): ICD-10-CM

## 2021-04-27 DIAGNOSIS — G47.33 OBSTRUCTIVE SLEEP APNEA, ADULT: ICD-10-CM

## 2021-04-27 DIAGNOSIS — R06.83 SNORING: Primary | ICD-10-CM

## 2021-04-27 PROCEDURE — 99203 OFFICE O/P NEW LOW 30 MIN: CPT | Performed by: INTERNAL MEDICINE

## 2021-04-28 NOTE — PROGRESS NOTES
Subjective   Sumanth Alfaro is a 46 y.o. male is being seen for consultation today at the request of Austyn Carl MD for the evaluation of snoring nonrestorative sleep.  You have chosen to receive care through a telehealth visit.  Do you consent to use a video/audio connection for your medical care today? Yes    History of Present Illness  Patient says he has had snoring for at least 10 years.  He denies being noted to have apneas.  He denies awakening gasping for breath.  He says he usually feels rested in the morning.  He denies having morning headaches.    He denies waking with a dry mouth or sore throat.  He denies ever breaking his nose.  He denies having trouble breathing through his nose although he did have sinus surgery 2 years ago.  He denies falling asleep to sitting quietly during the day.  He does say in the evening he will fall asleep on the couch at times.  He denies getting sleepy driving.  He denies kicking or jerking his legs at night.  He denies having chronic pain that keeps him awake.    He will go to bed about 11 PM.  He falls asleep fairly quickly.  He estimates within 15 to 20 minutes.  He awakens maybe once during the night.  He thinks he gets about 6 hours of sleep but is rested.  He has had hypertension known for 15 years.  He has been told he is prediabetic.  He denies any history of heart disease.  Allergies   Allergen Reactions   • Sulfa Antibiotics Rash   He also has seasonal environmental allergies.      Current Outpatient Medications:   •  Cholecalciferol (Vitamin D3) 50 MCG (2000 UT) capsule, Take 1 capsule by mouth Daily., Disp: 30 capsule, Rfl: 11  •  Dapagliflozin Propanediol (Farxiga) 10 MG tablet, Take 10 mg by mouth Daily., Disp: 30 tablet, Rfl: 11  •  fluticasone (Flonase) 50 MCG/ACT nasal spray, 2 sprays into the nostril(s) as directed by provider Daily., Disp: 1 bottle, Rfl: 2  •  levETIRAcetam (KEPPRA) 500 MG tablet, Take 500 mg by mouth Daily., Disp: , Rfl:    •  lisinopril (PRINIVIL,ZESTRIL) 20 MG tablet, Take 1 tablet by mouth Daily., Disp: 90 tablet, Rfl: 3  •  metFORMIN ER (GLUCOPHAGE-XR) 750 MG 24 hr tablet, Take 1 tablet by mouth Daily With Breakfast., Disp: 30 tablet, Rfl: 5  •  omeprazole (priLOSEC) 20 MG capsule, Take 1 capsule by mouth once daily, Disp: 90 capsule, Rfl: 0  •  Potassium Citrate ER (UROCIT-K 15) 15 MEQ (1620 MG) tablet controlled-release, Take 15 mEq by mouth Every 12 (Twelve) Hours., Disp: 90 tablet, Rfl: 3  •  Semaglutide, 1 MG/DOSE, (Ozempic, 1 MG/DOSE,) 2 MG/1.5ML solution pen-injector, Inject 1 mg under the skin into the appropriate area as directed 1 (One) Time Per Week., Disp: 3 pen, Rfl: 11  •  tamsulosin (FLOMAX) 0.4 MG capsule 24 hr capsule, TAKE 1 CAPSULE BY MOUTH ONCE DAILY FOLOWING SAME MEAL DAILY, Disp: 90 capsule, Rfl: 0  •  venlafaxine XR (EFFEXOR-XR) 75 MG 24 hr capsule, Take 1 capsule by mouth Daily. with food, Disp: 90 capsule, Rfl: 1    Social History    Tobacco Use      Smoking status: Never Smoker      Smokeless tobacco: Never Used       Social History     Substance and Sexual Activity   Alcohol Use No       Caffeine: He has 4 servings of tea per day    Past Medical History:   Diagnosis Date   • Abdominal pain    • Acute cholecystitis    • Carpal tunnel syndrome     1/2020 had staph infection of right wrist after surgery   • GERD (gastroesophageal reflux disease)    • Headache     Dr Clement for headaches-referred to ENT   • Hypertension    • Kidney stone     per Navas   • Left shoulder pain    • Nephrolithiasis    • Sphincter of Oddi dysfunction    • Type 2 diabetes mellitus with morbid obesity (CMS/HCC) 2/24/2021 2/2021   • Vitamin D deficiency 2/24/2021       Past Surgical History:   Procedure Laterality Date   • CARPAL TUNNEL RELEASE Bilateral 2003   • CARPAL TUNNEL RELEASE Right 01/2020   • CARPAL TUNNEL RELEASE Left 11/2019   • CHOLECYSTECTOMY  2010    laparoscopic-per DR Adams    • COLONOSCOPY  11/2009    mild ulcer  DR Hale   • SHOULDER ARTHROSCOPY  2009    DR Montana@   • SINUS SURGERY  2018    Dr Salmon did sinus procedure cleaning out sinuses       Family History   Problem Relation Age of Onset   • Obesity Father    • Coronary artery disease Father    • Hypertension Father    • Diabetes Father    • Hyperlipidemia Father    • Cancer Other         barthrolin gland   • Obesity Other        The following portions of the patient's history were reviewed and updated as appropriate: allergies, current medications, past family history, past medical history, past social history, past surgical history and problem list.    Review of Systems   Constitutional: Positive for fatigue and unexpected weight change.   HENT: Negative.    Eyes: Negative.    Respiratory: Negative.    Cardiovascular: Negative.    Gastrointestinal: Negative.    Endocrine: Negative.    Genitourinary: Negative.    Musculoskeletal: Positive for arthralgias and joint swelling.   Skin: Negative.    Allergic/Immunologic: Negative.    Neurological: Negative.    Hematological: Negative.    Psychiatric/Behavioral: Negative.    Docena score is 7/24    Objective     There were no vitals taken for this visit.     Physical Exam  Patient appears to be awake and alert.  He is not appear to be in acute respiratory distress.  His stated weight is 294 pounds.  His height 5 feet 9 inches.  His body mass index is 44.  He has Mallampati class II anatomy.    Assessment/Plan   Diagnoses and all orders for this visit:    1. Snoring (Primary)  -     Home Sleep Study; Future    2. Obstructive sleep apnea, adult  -     Home Sleep Study; Future    3. Morbid obesity due to excess calories (CMS/HCC)    Patient has a history of snoring and sometimes nonrestorative sleep.  He has a history of hypertension and prediabetes.  I think is an excellent story for obstructive sleep apnea.  We will plan to proceed to home sleep testing.  We have discussed possible therapies including CPAP, weight  control, oral appliance, and surgery.  We have discussed the long-term consequences of untreated obstructive sleep apnea.  These include hypertension, diabetes, heart disease, stroke, and dementia.  He is encouraged to lose weight.  He declines referral to a dietitian.  He is encouraged avoid alcohol and sedatives close to bedtime.  He is encouraged to practice lateral position sleep.  We will see him back after his study.    Total time: 35 minutes exclusive of procedures.         Rodriguez Dsouza MD [facility]  Sleep Medicine  Pulmonary and Critical Care Medicine

## 2021-04-29 DIAGNOSIS — E66.01 TYPE 2 DIABETES MELLITUS WITH MORBID OBESITY (HCC): ICD-10-CM

## 2021-04-29 DIAGNOSIS — E11.69 TYPE 2 DIABETES MELLITUS WITH MORBID OBESITY (HCC): ICD-10-CM

## 2021-04-29 RX ORDER — DAPAGLIFLOZIN 10 MG/1
10 TABLET, FILM COATED ORAL DAILY
Qty: 90 TABLET | Refills: 3 | Status: SHIPPED | OUTPATIENT
Start: 2021-04-29 | End: 2021-08-24 | Stop reason: SDUPTHER

## 2021-05-20 RX ORDER — TAMSULOSIN HYDROCHLORIDE 0.4 MG/1
CAPSULE ORAL
Qty: 90 CAPSULE | Refills: 0 | Status: SHIPPED | OUTPATIENT
Start: 2021-05-20 | End: 2021-08-23

## 2021-06-29 RX ORDER — OMEPRAZOLE 20 MG/1
CAPSULE, DELAYED RELEASE ORAL
Qty: 90 CAPSULE | Refills: 0 | Status: SHIPPED | OUTPATIENT
Start: 2021-06-29 | End: 2021-09-27

## 2021-08-23 RX ORDER — TAMSULOSIN HYDROCHLORIDE 0.4 MG/1
CAPSULE ORAL
Qty: 90 CAPSULE | Refills: 0 | Status: SHIPPED | OUTPATIENT
Start: 2021-08-23 | End: 2021-11-17

## 2021-08-23 RX ORDER — VENLAFAXINE HYDROCHLORIDE 75 MG/1
CAPSULE, EXTENDED RELEASE ORAL
Qty: 90 CAPSULE | Refills: 0 | Status: SHIPPED | OUTPATIENT
Start: 2021-08-23 | End: 2021-11-17

## 2021-08-24 ENCOUNTER — OFFICE VISIT (OUTPATIENT)
Dept: INTERNAL MEDICINE | Facility: CLINIC | Age: 46
End: 2021-08-24

## 2021-08-24 ENCOUNTER — LAB (OUTPATIENT)
Dept: LAB | Facility: HOSPITAL | Age: 46
End: 2021-08-24

## 2021-08-24 VITALS
HEIGHT: 69 IN | BODY MASS INDEX: 42.95 KG/M2 | TEMPERATURE: 97.3 F | WEIGHT: 290 LBS | SYSTOLIC BLOOD PRESSURE: 104 MMHG | HEART RATE: 74 BPM | DIASTOLIC BLOOD PRESSURE: 78 MMHG

## 2021-08-24 DIAGNOSIS — I10 HYPERTENSION, BENIGN: Primary | ICD-10-CM

## 2021-08-24 DIAGNOSIS — E66.01 TYPE 2 DIABETES MELLITUS WITH MORBID OBESITY (HCC): ICD-10-CM

## 2021-08-24 DIAGNOSIS — I10 HYPERTENSION, BENIGN: ICD-10-CM

## 2021-08-24 DIAGNOSIS — E11.69 TYPE 2 DIABETES MELLITUS WITH MORBID OBESITY (HCC): ICD-10-CM

## 2021-08-24 DIAGNOSIS — F33.0 MILD EPISODE OF RECURRENT MAJOR DEPRESSIVE DISORDER (HCC): ICD-10-CM

## 2021-08-24 DIAGNOSIS — E66.01 MORBID OBESITY DUE TO EXCESS CALORIES (HCC): ICD-10-CM

## 2021-08-24 LAB
ALBUMIN UR-MCNC: <1.2 MG/DL
CREAT UR-MCNC: 198.7 MG/DL
HBA1C MFR BLD: 5.6 % (ref 4.8–5.6)
MICROALBUMIN/CREAT UR: NORMAL MG/G{CREAT}

## 2021-08-24 PROCEDURE — 83036 HEMOGLOBIN GLYCOSYLATED A1C: CPT

## 2021-08-24 PROCEDURE — 80053 COMPREHEN METABOLIC PANEL: CPT

## 2021-08-24 PROCEDURE — 82570 ASSAY OF URINE CREATININE: CPT

## 2021-08-24 PROCEDURE — 99214 OFFICE O/P EST MOD 30 MIN: CPT | Performed by: INTERNAL MEDICINE

## 2021-08-24 PROCEDURE — 82043 UR ALBUMIN QUANTITATIVE: CPT

## 2021-08-24 PROCEDURE — 80061 LIPID PANEL: CPT

## 2021-08-24 RX ORDER — DAPAGLIFLOZIN 10 MG/1
10 TABLET, FILM COATED ORAL DAILY
Qty: 90 TABLET | Refills: 1 | Status: SHIPPED | OUTPATIENT
Start: 2021-08-24 | End: 2022-02-25 | Stop reason: SDUPTHER

## 2021-08-24 NOTE — ASSESSMENT & PLAN NOTE
Patient's (Body mass index is 42.81 kg/m².) indicates that they are morbidly obese (BMI > 40 or > 35 with obesity - related health condition) with health conditions that include hypertension . Weight is improving with lifestyle modifications. BMI is is above average; BMI management plan is completed. We discussed portion control and increasing exercise.

## 2021-08-24 NOTE — ASSESSMENT & PLAN NOTE
Diabetes is improving with treatment.   Continue current treatment regimen.  Regular aerobic exercise.  Diabetes will be reassessed in 6 months.

## 2021-08-24 NOTE — PROGRESS NOTES
Chief Complaint   Patient presents with   • Diabetes       History of Present Illness  46 y.o. white male presents for follow up ofn HTN and diabetes and obesity.     Review of Systems   Constitutional: Negative for chills and fever.   Respiratory: Negative for cough and shortness of breath.    Cardiovascular: Negative for chest pain and palpitations.   Gastrointestinal: Negative for abdominal pain, nausea and vomiting.   Skin: Negative for rash.   Neurological: Negative for dizziness, light-headedness and headaches.   Psychiatric/Behavioral: Positive for sleep disturbance.   All other systems reviewed and are negative.    .    PMSFH:  The following portions of the patient's history were reviewed and updated as appropriate: allergies, current medications, past family history, past medical history, past social history, past surgical history and problem list.      Current Outpatient Medications:   •  Cholecalciferol (Vitamin D3) 50 MCG (2000 UT) capsule, Take 1 capsule by mouth Daily., Disp: 30 capsule, Rfl: 11  •  Dapagliflozin Propanediol (Farxiga) 10 MG tablet, Take 10 mg by mouth Daily., Disp: 90 tablet, Rfl: 1  •  fluticasone (Flonase) 50 MCG/ACT nasal spray, 2 sprays into the nostril(s) as directed by provider Daily., Disp: 1 bottle, Rfl: 2  •  levETIRAcetam (KEPPRA) 500 MG tablet, Take 500 mg by mouth Daily., Disp: , Rfl:   •  lisinopril (PRINIVIL,ZESTRIL) 20 MG tablet, Take 1 tablet by mouth Daily., Disp: 90 tablet, Rfl: 3  •  omeprazole (priLOSEC) 20 MG capsule, Take 1 capsule by mouth once daily, Disp: 90 capsule, Rfl: 0  •  Potassium Citrate ER (UROCIT-K 15) 15 MEQ (1620 MG) tablet controlled-release, Take 15 mEq by mouth Every 12 (Twelve) Hours., Disp: 90 tablet, Rfl: 3  •  tamsulosin (FLOMAX) 0.4 MG capsule 24 hr capsule, TAKE 1 CAPSULE BY MOUTH ONCE DAILY, FOLLOWING THE SAME MEAL, Disp: 90 capsule, Rfl: 0  •  venlafaxine XR (EFFEXOR-XR) 75 MG 24 hr capsule, TAKE 1 CAPSULE BY MOUTH ONCE DAILY WITH FOOD,  "Disp: 90 capsule, Rfl: 0    VITALS:  /78   Pulse 74   Temp 97.3 °F (36.3 °C)   Ht 175.3 cm (69.02\")   Wt 132 kg (290 lb)   BMI 42.81 kg/m²     Physical Exam  Vitals and nursing note reviewed.   Constitutional:       Appearance: Normal appearance. He is well-developed.   HENT:      Head: Normocephalic.   Eyes:      Extraocular Movements: Extraocular movements intact.      Conjunctiva/sclera: Conjunctivae normal.   Cardiovascular:      Rate and Rhythm: Normal rate and regular rhythm.      Heart sounds: Normal heart sounds.   Pulmonary:      Effort: Pulmonary effort is normal. No respiratory distress.      Breath sounds: Normal breath sounds.   Abdominal:      General: Bowel sounds are normal.      Palpations: Abdomen is soft.      Tenderness: There is no abdominal tenderness.      Comments: Obese    Skin:     General: Skin is warm and dry.   Neurological:      General: No focal deficit present.      Mental Status: He is alert and oriented to person, place, and time.   Psychiatric:         Mood and Affect: Mood normal.         Behavior: Behavior normal.         LABS:  Results for orders placed or performed in visit on 02/24/21   Hepatitis C Antibody    Specimen: Blood   Result Value Ref Range    Hepatitis C Ab Non-Reactive Non-Reactive   Comprehensive Metabolic Panel    Specimen: Blood   Result Value Ref Range    Glucose 83 65 - 99 mg/dL    BUN 11 6 - 20 mg/dL    Creatinine 0.88 0.76 - 1.27 mg/dL    Sodium 138 136 - 145 mmol/L    Potassium 4.4 3.5 - 5.2 mmol/L    Chloride 101 98 - 107 mmol/L    CO2 28.6 22.0 - 29.0 mmol/L    Calcium 9.2 8.6 - 10.5 mg/dL    Total Protein 7.8 6.0 - 8.5 g/dL    Albumin 4.10 3.50 - 5.20 g/dL    ALT (SGPT) 67 (H) 1 - 41 U/L    AST (SGOT) 47 (H) 1 - 40 U/L    Alkaline Phosphatase 74 39 - 117 U/L    Total Bilirubin 0.5 0.0 - 1.2 mg/dL    eGFR Non African Amer 93 >60 mL/min/1.73    Globulin 3.7 gm/dL    A/G Ratio 1.1 g/dL    BUN/Creatinine Ratio 12.5 7.0 - 25.0    Anion Gap 8.4 5.0 " - 15.0 mmol/L   Hemoglobin A1c    Specimen: Blood   Result Value Ref Range    Hemoglobin A1C 6.63 (H) 4.80 - 5.60 %   Lipid Panel    Specimen: Blood   Result Value Ref Range    Total Cholesterol 151 0 - 200 mg/dL    Triglycerides 99 0 - 150 mg/dL    HDL Cholesterol 40 40 - 60 mg/dL    LDL Cholesterol  93 0 - 100 mg/dL    VLDL Cholesterol 18 5 - 40 mg/dL    LDL/HDL Ratio 2.28    Microalbumin / Creatinine Urine Ratio - Urine, Clean Catch    Specimen: Urine, Clean Catch   Result Value Ref Range    Microalbumin/Creatinine Ratio      Creatinine, Urine 166.9 mg/dL    Microalbumin, Urine <1.2 mg/dL   TSH Rfx On Abnormal To Free T4    Specimen: Blood   Result Value Ref Range    TSH 2.080 0.270 - 4.200 uIU/mL   PSA Screen    Specimen: Blood   Result Value Ref Range    PSA 0.616 0.000 - 4.000 ng/mL   Vitamin D 25 Hydroxy    Specimen: Blood   Result Value Ref Range    25 Hydroxy, Vitamin D 22.6 (L) 30.0 - 100.0 ng/ml   Vitamin B12    Specimen: Blood   Result Value Ref Range    Vitamin B-12 493 211 - 946 pg/mL   CBC Auto Differential    Specimen: Blood   Result Value Ref Range    WBC 9.82 3.40 - 10.80 10*3/mm3    RBC 5.03 4.14 - 5.80 10*6/mm3    Hemoglobin 13.7 13.0 - 17.7 g/dL    Hematocrit 41.2 37.5 - 51.0 %    MCV 81.9 79.0 - 97.0 fL    MCH 27.2 26.6 - 33.0 pg    MCHC 33.3 31.5 - 35.7 g/dL    RDW 14.4 12.3 - 15.4 %    RDW-SD 42.7 37.0 - 54.0 fl    MPV 9.0 6.0 - 12.0 fL    Platelets 348 140 - 450 10*3/mm3    Neutrophil % 52.8 42.7 - 76.0 %    Lymphocyte % 36.9 19.6 - 45.3 %    Monocyte % 6.0 5.0 - 12.0 %    Eosinophil % 3.3 0.3 - 6.2 %    Basophil % 0.7 0.0 - 1.5 %    Immature Grans % 0.3 0.0 - 0.5 %    Neutrophils, Absolute 5.19 1.70 - 7.00 10*3/mm3    Lymphocytes, Absolute 3.62 (H) 0.70 - 3.10 10*3/mm3    Monocytes, Absolute 0.59 0.10 - 0.90 10*3/mm3    Eosinophils, Absolute 0.32 0.00 - 0.40 10*3/mm3    Basophils, Absolute 0.07 0.00 - 0.20 10*3/mm3    Immature Grans, Absolute 0.03 0.00 - 0.05 10*3/mm3    nRBC 0.0 0.0 - 0.2  /100 WBC       Procedures         ASSESSMENT/PLAN:  Problems Addressed this Visit        Cardiac and Vasculature    Hypertension, benign - Primary     Hypertension is improving with treatment.  Continue current treatment regimen.  Dietary sodium restriction.  Weight loss.  Regular aerobic exercise.  Blood pressure will be reassessed at the next regular appointment.         Relevant Orders    Comprehensive Metabolic Panel    Lipid Panel    Microalbumin / Creatinine Urine Ratio - Urine, Clean Catch       Endocrine and Metabolic    Morbid obesity due to excess calories (CMS/HCC)     Patient's (Body mass index is 42.81 kg/m².) indicates that they are morbidly obese (BMI > 40 or > 35 with obesity - related health condition) with health conditions that include hypertension . Weight is improving with lifestyle modifications. BMI is is above average; BMI management plan is completed. We discussed portion control and increasing exercise.             Mental Health    Mild episode of recurrent major depressive disorder (CMS/HCC)     Patient's depression is recurrent and is mild without psychosis. Their depression is currently in partial remission and the condition is improving with treatment. This will be reassessed at the next regular appointment. F/U as described:patient will continue current medication therapy.            Other    Type 2 diabetes mellitus with morbid obesity (CMS/HCC)     Diabetes is improving with treatment.   Continue current treatment regimen.  Regular aerobic exercise.  Diabetes will be reassessed in 6 months.         Relevant Medications    Dapagliflozin Propanediol (Farxiga) 10 MG tablet    Other Relevant Orders    Hemoglobin A1c      Diagnoses       Codes Comments    Hypertension, benign    -  Primary ICD-10-CM: I10  ICD-9-CM: 401.1     Morbid obesity due to excess calories (CMS/HCC)     ICD-10-CM: E66.01  ICD-9-CM: 278.01     Mild episode of recurrent major depressive disorder (CMS/HCC)      ICD-10-CM: F33.0  ICD-9-CM: 296.31     Type 2 diabetes mellitus with morbid obesity (CMS/HCC)     ICD-10-CM: E11.69, E66.01  ICD-9-CM: 250.00, 278.01           FOLLOW-UP:  Return in about 6 months (around 2/25/2022) for Labs this visit, Annual physical.      Electronically signed by:    Austyn Carl MD

## 2021-08-25 LAB
ALBUMIN SERPL-MCNC: 4.2 G/DL (ref 3.5–5.2)
ALBUMIN/GLOB SERPL: 1.1 G/DL
ALP SERPL-CCNC: 88 U/L (ref 39–117)
ALT SERPL W P-5'-P-CCNC: 49 U/L (ref 1–41)
ANION GAP SERPL CALCULATED.3IONS-SCNC: 11 MMOL/L (ref 5–15)
AST SERPL-CCNC: 39 U/L (ref 1–40)
BILIRUB SERPL-MCNC: 0.7 MG/DL (ref 0–1.2)
BUN SERPL-MCNC: 10 MG/DL (ref 6–20)
BUN/CREAT SERPL: 10.4 (ref 7–25)
CALCIUM SPEC-SCNC: 9.4 MG/DL (ref 8.6–10.5)
CHLORIDE SERPL-SCNC: 102 MMOL/L (ref 98–107)
CHOLEST SERPL-MCNC: 194 MG/DL (ref 0–200)
CO2 SERPL-SCNC: 26 MMOL/L (ref 22–29)
CREAT SERPL-MCNC: 0.96 MG/DL (ref 0.76–1.27)
GFR SERPL CREATININE-BSD FRML MDRD: 84 ML/MIN/1.73
GLOBULIN UR ELPH-MCNC: 3.8 GM/DL
GLUCOSE SERPL-MCNC: 72 MG/DL (ref 65–99)
HDLC SERPL-MCNC: 38 MG/DL (ref 40–60)
LDLC SERPL CALC-MCNC: 129 MG/DL (ref 0–100)
LDLC/HDLC SERPL: 3.32 {RATIO}
POTASSIUM SERPL-SCNC: 4.6 MMOL/L (ref 3.5–5.2)
PROT SERPL-MCNC: 8 G/DL (ref 6–8.5)
SODIUM SERPL-SCNC: 139 MMOL/L (ref 136–145)
TRIGL SERPL-MCNC: 149 MG/DL (ref 0–150)
VLDLC SERPL-MCNC: 27 MG/DL (ref 5–40)

## 2021-08-27 RX ORDER — ROSUVASTATIN CALCIUM 10 MG/1
10 TABLET, COATED ORAL DAILY
Qty: 30 TABLET | Refills: 5 | Status: SHIPPED | OUTPATIENT
Start: 2021-08-27 | End: 2022-02-25 | Stop reason: SDUPTHER

## 2021-09-27 RX ORDER — OMEPRAZOLE 20 MG/1
CAPSULE, DELAYED RELEASE ORAL
Qty: 90 CAPSULE | Refills: 0 | Status: SHIPPED | OUTPATIENT
Start: 2021-09-27 | End: 2022-01-04

## 2021-11-17 RX ORDER — TAMSULOSIN HYDROCHLORIDE 0.4 MG/1
CAPSULE ORAL
Qty: 90 CAPSULE | Refills: 0 | Status: SHIPPED | OUTPATIENT
Start: 2021-11-17 | End: 2022-02-25 | Stop reason: SDUPTHER

## 2021-11-17 RX ORDER — VENLAFAXINE HYDROCHLORIDE 75 MG/1
CAPSULE, EXTENDED RELEASE ORAL
Qty: 90 CAPSULE | Refills: 0 | Status: SHIPPED | OUTPATIENT
Start: 2021-11-17 | End: 2022-02-25 | Stop reason: SDUPTHER

## 2022-01-04 RX ORDER — OMEPRAZOLE 20 MG/1
CAPSULE, DELAYED RELEASE ORAL
Qty: 90 CAPSULE | Refills: 0 | Status: SHIPPED | OUTPATIENT
Start: 2022-01-04 | End: 2022-02-25 | Stop reason: SDUPTHER

## 2022-02-25 ENCOUNTER — TELEPHONE (OUTPATIENT)
Dept: INTERNAL MEDICINE | Facility: CLINIC | Age: 47
End: 2022-02-25

## 2022-02-25 ENCOUNTER — OFFICE VISIT (OUTPATIENT)
Dept: INTERNAL MEDICINE | Facility: CLINIC | Age: 47
End: 2022-02-25

## 2022-02-25 ENCOUNTER — LAB (OUTPATIENT)
Dept: LAB | Facility: HOSPITAL | Age: 47
End: 2022-02-25

## 2022-02-25 VITALS
BODY MASS INDEX: 42.51 KG/M2 | DIASTOLIC BLOOD PRESSURE: 68 MMHG | WEIGHT: 287 LBS | TEMPERATURE: 98.2 F | HEART RATE: 80 BPM | SYSTOLIC BLOOD PRESSURE: 108 MMHG | HEIGHT: 69 IN

## 2022-02-25 DIAGNOSIS — Z12.5 SCREENING PSA (PROSTATE SPECIFIC ANTIGEN): ICD-10-CM

## 2022-02-25 DIAGNOSIS — E11.69 TYPE 2 DIABETES MELLITUS WITH MORBID OBESITY: ICD-10-CM

## 2022-02-25 DIAGNOSIS — I10 HYPERTENSION, BENIGN: ICD-10-CM

## 2022-02-25 DIAGNOSIS — E78.5 HYPERLIPIDEMIA LDL GOAL <70: ICD-10-CM

## 2022-02-25 DIAGNOSIS — E78.5 HYPERLIPIDEMIA LDL GOAL <100: ICD-10-CM

## 2022-02-25 DIAGNOSIS — Z00.00 PREVENTATIVE HEALTH CARE: Primary | ICD-10-CM

## 2022-02-25 DIAGNOSIS — E55.9 VITAMIN D DEFICIENCY: ICD-10-CM

## 2022-02-25 DIAGNOSIS — E66.01 TYPE 2 DIABETES MELLITUS WITH MORBID OBESITY: ICD-10-CM

## 2022-02-25 DIAGNOSIS — F33.0 MILD EPISODE OF RECURRENT MAJOR DEPRESSIVE DISORDER: ICD-10-CM

## 2022-02-25 LAB
ALBUMIN UR-MCNC: <1.2 MG/DL
BASOPHILS # BLD AUTO: 0.09 10*3/MM3 (ref 0–0.2)
BASOPHILS NFR BLD AUTO: 0.8 % (ref 0–1.5)
CREAT UR-MCNC: 220.5 MG/DL
DEPRECATED RDW RBC AUTO: 42.7 FL (ref 37–54)
EOSINOPHIL # BLD AUTO: 0.48 10*3/MM3 (ref 0–0.4)
EOSINOPHIL NFR BLD AUTO: 4.4 % (ref 0.3–6.2)
ERYTHROCYTE [DISTWIDTH] IN BLOOD BY AUTOMATED COUNT: 14.4 % (ref 12.3–15.4)
HBA1C MFR BLD: 5.9 % (ref 4.8–5.6)
HCT VFR BLD AUTO: 45.3 % (ref 37.5–51)
HGB BLD-MCNC: 14.7 G/DL (ref 13–17.7)
IMM GRANULOCYTES # BLD AUTO: 0.04 10*3/MM3 (ref 0–0.05)
IMM GRANULOCYTES NFR BLD AUTO: 0.4 % (ref 0–0.5)
LYMPHOCYTES # BLD AUTO: 4.02 10*3/MM3 (ref 0.7–3.1)
LYMPHOCYTES NFR BLD AUTO: 37 % (ref 19.6–45.3)
MCH RBC QN AUTO: 26.6 PG (ref 26.6–33)
MCHC RBC AUTO-ENTMCNC: 32.5 G/DL (ref 31.5–35.7)
MCV RBC AUTO: 81.9 FL (ref 79–97)
MICROALBUMIN/CREAT UR: NORMAL MG/G{CREAT}
MONOCYTES # BLD AUTO: 0.72 10*3/MM3 (ref 0.1–0.9)
MONOCYTES NFR BLD AUTO: 6.6 % (ref 5–12)
NEUTROPHILS NFR BLD AUTO: 5.52 10*3/MM3 (ref 1.7–7)
NEUTROPHILS NFR BLD AUTO: 50.8 % (ref 42.7–76)
NRBC BLD AUTO-RTO: 0 /100 WBC (ref 0–0.2)
PLATELET # BLD AUTO: 370 10*3/MM3 (ref 140–450)
PMV BLD AUTO: 9.3 FL (ref 6–12)
RBC # BLD AUTO: 5.53 10*6/MM3 (ref 4.14–5.8)
WBC NRBC COR # BLD: 10.87 10*3/MM3 (ref 3.4–10.8)

## 2022-02-25 PROCEDURE — 82306 VITAMIN D 25 HYDROXY: CPT

## 2022-02-25 PROCEDURE — 82570 ASSAY OF URINE CREATININE: CPT

## 2022-02-25 PROCEDURE — 83036 HEMOGLOBIN GLYCOSYLATED A1C: CPT

## 2022-02-25 PROCEDURE — 80061 LIPID PANEL: CPT

## 2022-02-25 PROCEDURE — 80050 GENERAL HEALTH PANEL: CPT

## 2022-02-25 PROCEDURE — 82043 UR ALBUMIN QUANTITATIVE: CPT

## 2022-02-25 PROCEDURE — 99396 PREV VISIT EST AGE 40-64: CPT | Performed by: INTERNAL MEDICINE

## 2022-02-25 PROCEDURE — G0103 PSA SCREENING: HCPCS

## 2022-02-25 RX ORDER — ROSUVASTATIN CALCIUM 10 MG/1
10 TABLET, COATED ORAL DAILY
Qty: 90 TABLET | Refills: 1 | Status: SHIPPED | OUTPATIENT
Start: 2022-02-25 | End: 2022-09-09 | Stop reason: SDUPTHER

## 2022-02-25 RX ORDER — POTASSIUM CITRATE 15 MEQ/1
15 TABLET, EXTENDED RELEASE ORAL EVERY 12 HOURS SCHEDULED
Qty: 180 TABLET | Refills: 1 | Status: SHIPPED | OUTPATIENT
Start: 2022-02-25

## 2022-02-25 RX ORDER — VENLAFAXINE HYDROCHLORIDE 75 MG/1
75 CAPSULE, EXTENDED RELEASE ORAL DAILY
Qty: 90 CAPSULE | Refills: 1 | Status: SHIPPED | OUTPATIENT
Start: 2022-02-25 | End: 2022-09-06

## 2022-02-25 RX ORDER — TAMSULOSIN HYDROCHLORIDE 0.4 MG/1
1 CAPSULE ORAL DAILY
Qty: 90 CAPSULE | Refills: 1 | Status: SHIPPED | OUTPATIENT
Start: 2022-02-25 | End: 2022-09-06

## 2022-02-25 RX ORDER — LISINOPRIL 20 MG/1
20 TABLET ORAL DAILY
Qty: 90 TABLET | Refills: 3 | Status: SHIPPED | OUTPATIENT
Start: 2022-02-25 | End: 2022-03-22

## 2022-02-25 RX ORDER — OMEPRAZOLE 20 MG/1
20 CAPSULE, DELAYED RELEASE ORAL DAILY
Qty: 90 CAPSULE | Refills: 1 | Status: SHIPPED | OUTPATIENT
Start: 2022-02-25 | End: 2022-04-07

## 2022-02-25 RX ORDER — DAPAGLIFLOZIN 10 MG/1
10 TABLET, FILM COATED ORAL DAILY
Qty: 90 TABLET | Refills: 1 | Status: SHIPPED | OUTPATIENT
Start: 2022-02-25 | End: 2022-09-09 | Stop reason: SDUPTHER

## 2022-02-25 NOTE — PROGRESS NOTES
Chief Complaint   Patient presents with   • Annual Exam     fasting   • Med Refill       History of Present Illness  46 y.o. male presents for wellness exam. He denies chest pain.     Review of Systems   Constitutional: Negative for chills and fever.   Respiratory: Negative for cough and shortness of breath.    Cardiovascular: Negative for chest pain and palpitations.   Gastrointestinal: Negative for abdominal pain, nausea and vomiting.   Skin: Negative for rash.   Neurological: Negative for dizziness, light-headedness and headaches.   All other systems reviewed and are negative.    .    PMSFH:  The following portions of the patient's history were reviewed and updated as appropriate: allergies, current medications, past family history, past medical history, past social history, past surgical history and problem list.      Current Outpatient Medications:   •  Cholecalciferol (Vitamin D3) 50 MCG (2000 UT) capsule, Take 1 capsule by mouth Daily., Disp: 30 capsule, Rfl: 11  •  Dapagliflozin Propanediol (Farxiga) 10 MG tablet, Take 10 mg by mouth Daily., Disp: 90 tablet, Rfl: 1  •  fluticasone (Flonase) 50 MCG/ACT nasal spray, 2 sprays into the nostril(s) as directed by provider Daily., Disp: 1 bottle, Rfl: 2  •  levETIRAcetam (KEPPRA) 500 MG tablet, Take 500 mg by mouth Daily., Disp: , Rfl:   •  lisinopril (PRINIVIL,ZESTRIL) 20 MG tablet, Take 1 tablet by mouth Daily., Disp: 90 tablet, Rfl: 3  •  omeprazole (priLOSEC) 20 MG capsule, Take 1 capsule by mouth Daily., Disp: 90 capsule, Rfl: 1  •  Potassium Citrate ER (Urocit-K 15) 15 MEQ (1620 MG) tablet controlled-release, Take 15 mEq by mouth Every 12 (Twelve) Hours., Disp: 180 tablet, Rfl: 1  •  rosuvastatin (Crestor) 10 MG tablet, Take 1 tablet by mouth Daily., Disp: 90 tablet, Rfl: 1  •  tamsulosin (FLOMAX) 0.4 MG capsule 24 hr capsule, Take 1 capsule by mouth Daily., Disp: 90 capsule, Rfl: 1  •  venlafaxine XR (EFFEXOR-XR) 75 MG 24 hr capsule, Take 1 capsule by mouth  "Daily. with food, Disp: 90 capsule, Rfl: 1    VITALS:  /68   Pulse 80   Temp 98.2 °F (36.8 °C)   Ht 175.3 cm (69.02\")   Wt 130 kg (287 lb)   BMI 42.36 kg/m²     Physical Exam  Vitals and nursing note reviewed.   Constitutional:       Appearance: Normal appearance. He is well-developed.   HENT:      Head: Normocephalic.   Eyes:      Extraocular Movements: Extraocular movements intact.      Conjunctiva/sclera: Conjunctivae normal.   Cardiovascular:      Rate and Rhythm: Normal rate and regular rhythm.      Heart sounds: Normal heart sounds.   Pulmonary:      Effort: Pulmonary effort is normal. No respiratory distress.      Breath sounds: Normal breath sounds.   Abdominal:      General: Bowel sounds are normal.      Palpations: Abdomen is soft.      Tenderness: There is no abdominal tenderness.   Feet:      Right foot:      Skin integrity: Skin integrity normal. No ulcer or blister.      Left foot:      Skin integrity: Skin integrity normal. No ulcer or blister.      Comments: Diabetic Foot Exam Performed  Skin:     General: Skin is warm and dry.   Neurological:      General: No focal deficit present.      Mental Status: He is alert and oriented to person, place, and time.   Psychiatric:         Behavior: Behavior normal.         LABS:      Procedures         ASSESSMENT/PLAN:  Problems Addressed this Visit        Cardiac and Vasculature    Hyperlipidemia LDL goal <70     Lipid abnormalities are unchanged.  Nutritional counseling was provided. and Pharmacotherapy as ordered.  Lipids will be reassessed in 6 months.         Relevant Medications    rosuvastatin (Crestor) 10 MG tablet    Hypertension, benign     Hypertension is unchanged.  Continue current treatment regimen.  Dietary sodium restriction.  Weight loss.  Regular aerobic exercise.  Blood pressure will be reassessed at the next regular appointment.         Relevant Medications    lisinopril (PRINIVIL,ZESTRIL) 20 MG tablet    Other Relevant Orders    " TSH Rfx On Abnormal To Free T4 (Completed)       Endocrine and Metabolic    Vitamin D deficiency    Relevant Orders    Vitamin D 25 Hydroxy (Completed)       Health Encounters    Preventative health care - Primary     His mood is doing overall ok. Age-appropriate Counseling:  Discussed preventative medicine issues with patient including regular exercise, healthy diet, stress reduction, adequate sleep and recommended age-appropriate screening studies.  Immunizations reviewed.                 Mental Health    Mild episode of recurrent major depressive disorder (HCC)     Patient's depression is recurrent and is mild without psychosis. Their depression is currently in partial remission and the condition is unchanged. This will be reassessed at the next regular appointment. F/U as described:patient will continue current medication therapy.         Relevant Medications    venlafaxine XR (EFFEXOR-XR) 75 MG 24 hr capsule       Other    Type 2 diabetes mellitus with morbid obesity (HCC)     Diabetes is unchanged.   Continue current treatment regimen.  Regular aerobic exercise.  Diabetes will be reassessed in 6 months.         Relevant Medications    Dapagliflozin Propanediol (Farxiga) 10 MG tablet    Other Relevant Orders    Hemoglobin A1c (Completed)      Other Visit Diagnoses     Screening PSA (prostate specific antigen)        Relevant Orders    PSA Screen (Completed)      Diagnoses       Codes Comments    Preventative health care    -  Primary ICD-10-CM: Z00.00  ICD-9-CM: V70.0     Hyperlipidemia LDL goal <100     ICD-10-CM: E78.5  ICD-9-CM: 272.4     Vitamin D deficiency     ICD-10-CM: E55.9  ICD-9-CM: 268.9     Mild episode of recurrent major depressive disorder (HCC)     ICD-10-CM: F33.0  ICD-9-CM: 296.31     Hypertension, benign     ICD-10-CM: I10  ICD-9-CM: 401.1     Type 2 diabetes mellitus with morbid obesity (HCC)     ICD-10-CM: E11.69, E66.01  ICD-9-CM: 250.00, 278.01     Screening PSA (prostate specific  antigen)     ICD-10-CM: Z12.5  ICD-9-CM: V76.44     Hyperlipidemia LDL goal <70     ICD-10-CM: E78.5  ICD-9-CM: 272.4           FOLLOW-UP:  Return in about 6 months (around 8/25/2022) for Recheck, Labs this visit.      Electronically signed by:    Austyn Carl MD

## 2022-02-26 LAB
25(OH)D3 SERPL-MCNC: 29.9 NG/ML (ref 30–100)
ALBUMIN SERPL-MCNC: 4.3 G/DL (ref 3.5–5.2)
ALBUMIN/GLOB SERPL: 1.2 G/DL
ALP SERPL-CCNC: 79 U/L (ref 39–117)
ALT SERPL W P-5'-P-CCNC: 56 U/L (ref 1–41)
ANION GAP SERPL CALCULATED.3IONS-SCNC: 12 MMOL/L (ref 5–15)
AST SERPL-CCNC: 37 U/L (ref 1–40)
BILIRUB SERPL-MCNC: 0.5 MG/DL (ref 0–1.2)
BUN SERPL-MCNC: 7 MG/DL (ref 6–20)
BUN/CREAT SERPL: 7.8 (ref 7–25)
CALCIUM SPEC-SCNC: 9.2 MG/DL (ref 8.6–10.5)
CHLORIDE SERPL-SCNC: 102 MMOL/L (ref 98–107)
CHOLEST SERPL-MCNC: 162 MG/DL (ref 0–200)
CO2 SERPL-SCNC: 27 MMOL/L (ref 22–29)
CREAT SERPL-MCNC: 0.9 MG/DL (ref 0.76–1.27)
GFR SERPL CREATININE-BSD FRML MDRD: 90 ML/MIN/1.73
GLOBULIN UR ELPH-MCNC: 3.5 GM/DL
GLUCOSE SERPL-MCNC: 85 MG/DL (ref 65–99)
HDLC SERPL-MCNC: 41 MG/DL (ref 40–60)
LDLC SERPL CALC-MCNC: 102 MG/DL (ref 0–100)
LDLC/HDLC SERPL: 2.44 {RATIO}
POTASSIUM SERPL-SCNC: 4.2 MMOL/L (ref 3.5–5.2)
PROT SERPL-MCNC: 7.8 G/DL (ref 6–8.5)
PSA SERPL-MCNC: 0.67 NG/ML (ref 0–4)
SODIUM SERPL-SCNC: 141 MMOL/L (ref 136–145)
TRIGL SERPL-MCNC: 105 MG/DL (ref 0–150)
TSH SERPL DL<=0.05 MIU/L-ACNC: 1.5 UIU/ML (ref 0.27–4.2)
VLDLC SERPL-MCNC: 19 MG/DL (ref 5–40)

## 2022-02-27 NOTE — ASSESSMENT & PLAN NOTE
His mood is doing overall ok. Age-appropriate Counseling:  Discussed preventative medicine issues with patient including regular exercise, healthy diet, stress reduction, adequate sleep and recommended age-appropriate screening studies.  Immunizations reviewed.

## 2022-02-27 NOTE — ASSESSMENT & PLAN NOTE
Diabetes is unchanged.   Continue current treatment regimen.  Regular aerobic exercise.  Diabetes will be reassessed in 6 months.

## 2022-02-27 NOTE — ASSESSMENT & PLAN NOTE
Patient's (Body mass index is 42.36 kg/m².) indicates that they are morbidly obese (BMI > 40 or > 35 with obesity - related health condition) with health conditions that include hypertension, diabetes mellitus and dyslipidemias . Weight is improving with lifestyle modifications. BMI is is above average; BMI management plan is completed. We discussed portion control and increasing exercise.

## 2022-03-22 RX ORDER — LISINOPRIL 20 MG/1
TABLET ORAL
Qty: 90 TABLET | Refills: 0 | Status: SHIPPED | OUTPATIENT
Start: 2022-03-22 | End: 2022-09-09 | Stop reason: SDUPTHER

## 2022-04-07 RX ORDER — OMEPRAZOLE 20 MG/1
CAPSULE, DELAYED RELEASE ORAL
Qty: 90 CAPSULE | Refills: 1 | Status: SHIPPED | OUTPATIENT
Start: 2022-04-07 | End: 2022-09-28

## 2022-04-15 ENCOUNTER — OFFICE VISIT (OUTPATIENT)
Dept: INTERNAL MEDICINE | Facility: CLINIC | Age: 47
End: 2022-04-15

## 2022-04-15 VITALS
HEIGHT: 69 IN | HEART RATE: 84 BPM | SYSTOLIC BLOOD PRESSURE: 110 MMHG | WEIGHT: 290 LBS | BODY MASS INDEX: 42.95 KG/M2 | DIASTOLIC BLOOD PRESSURE: 70 MMHG | TEMPERATURE: 98.6 F

## 2022-04-15 DIAGNOSIS — R05.8 DRY COUGH: ICD-10-CM

## 2022-04-15 DIAGNOSIS — J98.01 POST-INFECTION BRONCHOSPASM: Primary | ICD-10-CM

## 2022-04-15 PROCEDURE — 96372 THER/PROPH/DIAG INJ SC/IM: CPT | Performed by: NURSE PRACTITIONER

## 2022-04-15 PROCEDURE — 99213 OFFICE O/P EST LOW 20 MIN: CPT | Performed by: NURSE PRACTITIONER

## 2022-04-15 RX ORDER — FLUTICASONE PROPIONATE 50 MCG
2 SPRAY, SUSPENSION (ML) NASAL DAILY
Qty: 16 ML | Refills: 11 | Status: SHIPPED | OUTPATIENT
Start: 2022-04-15 | End: 2022-09-09 | Stop reason: SDUPTHER

## 2022-04-15 RX ORDER — TRIAMCINOLONE ACETONIDE 40 MG/ML
80 INJECTION, SUSPENSION INTRA-ARTICULAR; INTRAMUSCULAR ONCE
Status: COMPLETED | OUTPATIENT
Start: 2022-04-15 | End: 2022-04-15

## 2022-04-15 RX ADMIN — TRIAMCINOLONE ACETONIDE 80 MG: 40 INJECTION, SUSPENSION INTRA-ARTICULAR; INTRAMUSCULAR at 13:40

## 2022-04-15 NOTE — PROGRESS NOTES
Sumanth Alfaro  1975  [id number]  Patient Care Team:  Austyn Carl MD as PCP - General (Internal Medicine)    Sumanth Alfaro is a pleasant 47 y.o. male who presents for evaluation of Cough    Chief Complaint   Patient presents with   • Cough       HPI:   Patient reports 3 wk hx of dry cough after URI, no reflux sx, on flonase daily for allergic rhinitis. Some better yesterday.  Has been on lisinopirl for years.   Past Medical History:   Diagnosis Date   • Abdominal pain    • Acute cholecystitis    • Carpal tunnel syndrome     1/2020 had staph infection of right wrist after surgery   • GERD (gastroesophageal reflux disease)    • Headache     Dr Clement for headaches-referred to ENT   • Hypertension    • Kidney stone     per Navas   • Left shoulder pain    • Nephrolithiasis    • Sphincter of Oddi dysfunction    • Type 2 diabetes mellitus with morbid obesity (HCC) 2/24/2021 2/2021   • Vitamin D deficiency 2/24/2021     Past Surgical History:   Procedure Laterality Date   • CARPAL TUNNEL RELEASE Bilateral 2003   • CARPAL TUNNEL RELEASE Right 01/2020   • CARPAL TUNNEL RELEASE Left 11/2019   • CHOLECYSTECTOMY  2010    laparoscopic-per DR Adams    • COLONOSCOPY  11/2009    mild ulcer DR Hale   • SHOULDER ARTHROSCOPY  2009    DR [email or web address]   • SINUS SURGERY  2018    Dr Salmon did sinus procedure cleaning out sinuses     Family History   Problem Relation Age of Onset   • Obesity Father    • Coronary artery disease Father    • Hypertension Father    • Diabetes Father    • Hyperlipidemia Father    • Cancer Other         barthrolin gland   • Obesity Other      Social History     Tobacco Use   Smoking Status Never Smoker   Smokeless Tobacco Never Used     Allergies   Allergen Reactions   • Sulfa Antibiotics Rash       Current Outpatient Medications:   •  Cholecalciferol (Vitamin D3) 50 MCG (2000 UT) capsule, Take 1 capsule by mouth Daily., Disp: 30 capsule, Rfl: 11  •  Dapagliflozin Propanediol  "(Farxiga) 10 MG tablet, Take 10 mg by mouth Daily., Disp: 90 tablet, Rfl: 1  •  fluticasone (Flonase) 50 MCG/ACT nasal spray, 2 sprays into the nostril(s) as directed by provider Daily., Disp: 16 mL, Rfl: 11  •  lisinopril (PRINIVIL,ZESTRIL) 20 MG tablet, Take 1 tablet by mouth once daily, Disp: 90 tablet, Rfl: 0  •  omeprazole (priLOSEC) 20 MG capsule, Take 1 capsule by mouth once daily, Disp: 90 capsule, Rfl: 1  •  Potassium Citrate ER (Urocit-K 15) 15 MEQ (1620 MG) tablet controlled-release, Take 15 mEq by mouth Every 12 (Twelve) Hours., Disp: 180 tablet, Rfl: 1  •  rosuvastatin (Crestor) 10 MG tablet, Take 1 tablet by mouth Daily., Disp: 90 tablet, Rfl: 1  •  tamsulosin (FLOMAX) 0.4 MG capsule 24 hr capsule, Take 1 capsule by mouth Daily., Disp: 90 capsule, Rfl: 1  •  venlafaxine XR (EFFEXOR-XR) 75 MG 24 hr capsule, Take 1 capsule by mouth Daily. with food, Disp: 90 capsule, Rfl: 1    Review of Systems   Constitutional: Negative for chills, fever and unexpected weight loss.   HENT: Positive for postnasal drip. Negative for ear pain, rhinorrhea and sore throat.    Respiratory: Negative for shortness of breath and wheezing.    Cardiovascular: Negative for chest pain.   Musculoskeletal: Negative for myalgias.   Skin: Negative for rash.     Review of systems was completed, and pertinent findings are noted in the HPI.  /70 (BP Location: Left arm, Patient Position: Sitting, Cuff Size: Adult)   Pulse 84   Temp 98.6 °F (37 °C) (Temporal)   Ht 175.3 cm (69.02\")   Wt 132 kg (290 lb)   BMI 42.81 kg/m²     Physical Exam  Constitutional:       Appearance: He is well-developed.   HENT:      Head: Normocephalic and atraumatic.      Comments: *wearing mask  Eyes:      Conjunctiva/sclera: Conjunctivae normal.      Pupils: Pupils are equal, round, and reactive to light.   Cardiovascular:      Rate and Rhythm: Normal rate and regular rhythm.      Pulses: Normal pulses.      Heart sounds: Normal heart sounds. "   Pulmonary:      Effort: Pulmonary effort is normal.      Breath sounds: Normal breath sounds.   Musculoskeletal:         General: Normal range of motion.      Cervical back: Normal range of motion and neck supple.   Skin:     General: Skin is warm and dry.   Neurological:      Mental Status: He is alert and oriented to person, place, and time.   Psychiatric:         Mood and Affect: Mood normal.         Behavior: Behavior normal.         Thought Content: Thought content normal.         Judgment: Judgment normal.         Procedures    PHQ-9 Total Score:      Assessment/Plan:  Diagnoses and all orders for this visit:    1. Post-infection bronchospasm (Primary)  Comments:  try kenalog inj today, increase Flonase to BID as long as needed then back to daily for maintenence.  Orders:  -     triamcinolone acetonide (KENALOG-40) injection 80 mg    2. Dry cough    Other orders  -     fluticasone (Flonase) 50 MCG/ACT nasal spray; 2 sprays into the nostril(s) as directed by provider Daily.  Dispense: 16 mL; Refill: 11       There are no Patient Instructions on file for this visit.  Plan of care reviewed with patient at the conclusion of today's visit. Education was provided regarding diagnosis, management and any prescribed or recommended OTC medications.  Patient verbalizes understanding of and agreement with management plan.    No follow-ups on file.    Dictated Utilizing Dragon Dictation.    CARMEL Jama          Answers for HPI/ROS submitted by the patient on 4/12/2022  What is the primary reason for your visit?: Cough  Chronicity: new  Onset: in the past 7 days  Progression since onset: waxing and waning  Frequency: constantly  Cough characteristics: non-productive, productive of brown sputum  ear congestion: No  headaches: No  heartburn: No  hemoptysis: No  nasal congestion: Yes  sweats: No  Aggravated by: lying down

## 2022-09-06 RX ORDER — VENLAFAXINE HYDROCHLORIDE 75 MG/1
CAPSULE, EXTENDED RELEASE ORAL
Qty: 90 CAPSULE | Refills: 0 | Status: SHIPPED | OUTPATIENT
Start: 2022-09-06 | End: 2022-12-06

## 2022-09-06 RX ORDER — TAMSULOSIN HYDROCHLORIDE 0.4 MG/1
CAPSULE ORAL
Qty: 90 CAPSULE | Refills: 0 | Status: SHIPPED | OUTPATIENT
Start: 2022-09-06 | End: 2022-12-05

## 2022-09-06 NOTE — TELEPHONE ENCOUNTER
Rx Refill Note  Requested Prescriptions     Pending Prescriptions Disp Refills   • tamsulosin (FLOMAX) 0.4 MG capsule 24 hr capsule [Pharmacy Med Name: Tamsulosin HCl 0.4 MG Oral Capsule] 90 capsule 0     Sig: Take 1 capsule by mouth once daily   • venlafaxine XR (EFFEXOR-XR) 75 MG 24 hr capsule [Pharmacy Med Name: Venlafaxine HCl ER 75 MG Oral Capsule Extended Release 24 Hour] 90 capsule 0     Sig: TAKE 1 CAPSULE BY MOUTH ONCE DAILY WITH FOOD      Last office visit with prescribing clinician: 04/15/22  Next office visit with prescribing clinician: 9/9/2022            Sue Villasenor LPN  09/06/22, 12:11 EDT

## 2022-09-09 ENCOUNTER — LAB (OUTPATIENT)
Dept: LAB | Facility: HOSPITAL | Age: 47
End: 2022-09-09

## 2022-09-09 ENCOUNTER — OFFICE VISIT (OUTPATIENT)
Dept: INTERNAL MEDICINE | Facility: CLINIC | Age: 47
End: 2022-09-09

## 2022-09-09 VITALS
DIASTOLIC BLOOD PRESSURE: 82 MMHG | WEIGHT: 288 LBS | BODY MASS INDEX: 42.65 KG/M2 | TEMPERATURE: 98.6 F | SYSTOLIC BLOOD PRESSURE: 120 MMHG | HEART RATE: 85 BPM | HEIGHT: 69 IN

## 2022-09-09 DIAGNOSIS — E66.01 TYPE 2 DIABETES MELLITUS WITH MORBID OBESITY: ICD-10-CM

## 2022-09-09 DIAGNOSIS — F33.0 MILD EPISODE OF RECURRENT MAJOR DEPRESSIVE DISORDER: ICD-10-CM

## 2022-09-09 DIAGNOSIS — E66.01 MORBID OBESITY DUE TO EXCESS CALORIES: Primary | ICD-10-CM

## 2022-09-09 DIAGNOSIS — E78.5 HYPERLIPIDEMIA LDL GOAL <70: ICD-10-CM

## 2022-09-09 DIAGNOSIS — E11.69 TYPE 2 DIABETES MELLITUS WITH MORBID OBESITY: ICD-10-CM

## 2022-09-09 DIAGNOSIS — I10 HYPERTENSION, BENIGN: ICD-10-CM

## 2022-09-09 DIAGNOSIS — Z86.010 HX OF COLONIC POLYP: ICD-10-CM

## 2022-09-09 PROBLEM — Z86.0100 HX OF COLONIC POLYP: Status: ACTIVE | Noted: 2022-09-09

## 2022-09-09 PROCEDURE — 82043 UR ALBUMIN QUANTITATIVE: CPT

## 2022-09-09 PROCEDURE — 80061 LIPID PANEL: CPT

## 2022-09-09 PROCEDURE — 82570 ASSAY OF URINE CREATININE: CPT

## 2022-09-09 PROCEDURE — 83036 HEMOGLOBIN GLYCOSYLATED A1C: CPT

## 2022-09-09 PROCEDURE — 99214 OFFICE O/P EST MOD 30 MIN: CPT | Performed by: INTERNAL MEDICINE

## 2022-09-09 PROCEDURE — 80053 COMPREHEN METABOLIC PANEL: CPT

## 2022-09-09 RX ORDER — ROSUVASTATIN CALCIUM 10 MG/1
10 TABLET, COATED ORAL DAILY
Qty: 90 TABLET | Refills: 1 | Status: SHIPPED | OUTPATIENT
Start: 2022-09-09

## 2022-09-09 RX ORDER — PSYLLIUM HUSK 3.4 G/6.5G
GRANULE ORAL
Start: 2022-09-09

## 2022-09-09 RX ORDER — DAPAGLIFLOZIN 10 MG/1
10 TABLET, FILM COATED ORAL DAILY
Qty: 90 TABLET | Refills: 1 | Status: SHIPPED | OUTPATIENT
Start: 2022-09-09

## 2022-09-09 RX ORDER — LISINOPRIL 20 MG/1
20 TABLET ORAL DAILY
Qty: 90 TABLET | Refills: 1 | Status: SHIPPED | OUTPATIENT
Start: 2022-09-09 | End: 2023-03-03

## 2022-09-09 RX ORDER — FLUTICASONE PROPIONATE 50 MCG
2 SPRAY, SUSPENSION (ML) NASAL DAILY
Qty: 16 ML | Refills: 11 | Status: SHIPPED | OUTPATIENT
Start: 2022-09-09

## 2022-09-09 NOTE — PROGRESS NOTES
Chief Complaint   Patient presents with   • Med Refill   • Diabetes       History of Present Illness  47 y.o. male presents for follow up of obesity and his diabetes     Review of Systems   Constitutional: Negative for chills and fever.   Respiratory: Negative for cough and shortness of breath.    Cardiovascular: Negative for chest pain and palpitations.   Gastrointestinal: Negative for abdominal pain, nausea and vomiting.   Skin: Negative for rash.   Neurological: Negative for dizziness, light-headedness and headaches.   All other systems reviewed and are negative.    .    PMSFH:  The following portions of the patient's history were reviewed and updated as appropriate: allergies, current medications, past family history, past medical history, past social history, past surgical history and problem list.      Current Outpatient Medications:   •  Cholecalciferol (Vitamin D3) 50 MCG (2000 UT) capsule, Take 1 capsule by mouth Daily., Disp: 30 capsule, Rfl: 11  •  dapagliflozin Propanediol (Farxiga) 10 MG tablet, Take 10 mg by mouth Daily., Disp: 90 tablet, Rfl: 1  •  fluticasone (Flonase) 50 MCG/ACT nasal spray, 2 sprays into the nostril(s) as directed by provider Daily., Disp: 16 mL, Rfl: 11  •  lisinopril (PRINIVIL,ZESTRIL) 20 MG tablet, Take 1 tablet by mouth Daily., Disp: 90 tablet, Rfl: 1  •  omeprazole (priLOSEC) 20 MG capsule, Take 1 capsule by mouth once daily, Disp: 90 capsule, Rfl: 1  •  Potassium Citrate ER (Urocit-K 15) 15 MEQ (1620 MG) tablet controlled-release, Take 15 mEq by mouth Every 12 (Twelve) Hours., Disp: 180 tablet, Rfl: 1  •  rosuvastatin (Crestor) 10 MG tablet, Take 1 tablet by mouth Daily., Disp: 90 tablet, Rfl: 1  •  tamsulosin (FLOMAX) 0.4 MG capsule 24 hr capsule, Take 1 capsule by mouth once daily, Disp: 90 capsule, Rfl: 0  •  venlafaxine XR (EFFEXOR-XR) 75 MG 24 hr capsule, TAKE 1 CAPSULE BY MOUTH ONCE DAILY WITH FOOD, Disp: 90 capsule, Rfl: 0  •  Psyllium (Konsyl-D) 52.3 % powder, Take  "with breakfast and supper, Disp: , Rfl:     VITALS:  /82   Pulse 85   Temp 98.6 °F (37 °C)   Ht 175.3 cm (69.02\")   Wt 131 kg (288 lb)   BMI 42.51 kg/m²     Physical Exam  Vitals and nursing note reviewed.   Constitutional:       Appearance: Normal appearance. He is well-developed.   HENT:      Head: Normocephalic.   Eyes:      Extraocular Movements: Extraocular movements intact.      Conjunctiva/sclera: Conjunctivae normal.   Cardiovascular:      Rate and Rhythm: Normal rate and regular rhythm.      Heart sounds: Normal heart sounds.   Pulmonary:      Effort: Pulmonary effort is normal. No respiratory distress.      Breath sounds: Normal breath sounds.   Abdominal:      General: Bowel sounds are normal.      Palpations: Abdomen is soft.      Tenderness: There is no abdominal tenderness.      Comments: Obese    Neurological:      General: No focal deficit present.      Mental Status: He is alert and oriented to person, place, and time.   Psychiatric:         Mood and Affect: Mood normal.         Behavior: Behavior normal.         LABS:      Procedures         ASSESSMENT/PLAN:  Problems Addressed this Visit        Cardiac and Vasculature    Hyperlipidemia LDL goal <70     Lipid abnormalities are unchanged.  Nutritional counseling was provided. and Pharmacotherapy as ordered.  Lipids will be reassessed in 6 months.         Relevant Medications    rosuvastatin (Crestor) 10 MG tablet    Other Relevant Orders    Comprehensive Metabolic Panel    Lipid Panel    Hypertension, benign     Hypertension is unchanged.  Continue current treatment regimen.  Regular aerobic exercise.  Blood pressure will be reassessed at the next regular appointment.         Relevant Medications    lisinopril (PRINIVIL,ZESTRIL) 20 MG tablet    Other Relevant Orders    Microalbumin / Creatinine Urine Ratio - Urine, Clean Catch       Endocrine and Metabolic    Morbid obesity due to excess calories (HCC) - Primary     Patient's (Body mass " index is 42.51 kg/m².) indicates that they are morbidly obese (BMI > 40 or > 35 with obesity - related health condition) with health conditions that include hypertension, diabetes mellitus, dyslipidemias and osteoarthritis . Weight is unchanged. BMI is is above average; BMI management plan is completed. We discussed portion control and increasing exercise Increase konsyl to twice a day .             Gastrointestinal Abdominal     Hx of colonic polyp       Mental Health    Mild episode of recurrent major depressive disorder (HCC)     Patient's depression is recurrent and is mild without psychosis. Their depression is currently in partial remission and the condition is improving with treatment. This will be reassessed at the next regular appointment. F/U as described:patient will continue current medication therapy.            Other    Type 2 diabetes mellitus with morbid obesity (HCC)     Diabetes is unchanged.   Continue current treatment regimen.  Regular aerobic exercise.  Diabetes will be reassessed in 6 months.         Relevant Medications    dapagliflozin Propanediol (Farxiga) 10 MG tablet    Other Relevant Orders    Microalbumin / Creatinine Urine Ratio - Urine, Clean Catch    Hemoglobin A1c      Diagnoses       Codes Comments    Morbid obesity due to excess calories (HCC)    -  Primary ICD-10-CM: E66.01  ICD-9-CM: 278.01     Hypertension, benign     ICD-10-CM: I10  ICD-9-CM: 401.1     Hyperlipidemia LDL goal <70     ICD-10-CM: E78.5  ICD-9-CM: 272.4     Type 2 diabetes mellitus with morbid obesity (HCC)     ICD-10-CM: E11.69, E66.01  ICD-9-CM: 250.00, 278.01     Mild episode of recurrent major depressive disorder (HCC)     ICD-10-CM: F33.0  ICD-9-CM: 296.31     Hx of colonic polyp     ICD-10-CM: Z86.010  ICD-9-CM: V12.72           FOLLOW-UP:  No follow-ups on file.      Electronically signed by:    Austyn Carl MD

## 2022-09-09 NOTE — ASSESSMENT & PLAN NOTE
Patient's (Body mass index is 42.51 kg/m².) indicates that they are morbidly obese (BMI > 40 or > 35 with obesity - related health condition) with health conditions that include hypertension, diabetes mellitus, dyslipidemias and osteoarthritis . Weight is unchanged. BMI is is above average; BMI management plan is completed. We discussed portion control and increasing exercise Increase konsyl to twice a day .

## 2022-09-10 LAB
ALBUMIN SERPL-MCNC: 4.2 G/DL (ref 3.5–5.2)
ALBUMIN UR-MCNC: <1.2 MG/DL
ALBUMIN/GLOB SERPL: 1.4 G/DL
ALP SERPL-CCNC: 68 U/L (ref 39–117)
ALT SERPL W P-5'-P-CCNC: 42 U/L (ref 1–41)
ANION GAP SERPL CALCULATED.3IONS-SCNC: 11.4 MMOL/L (ref 5–15)
AST SERPL-CCNC: 25 U/L (ref 1–40)
BILIRUB SERPL-MCNC: 0.4 MG/DL (ref 0–1.2)
BUN SERPL-MCNC: 11 MG/DL (ref 6–20)
BUN/CREAT SERPL: 11.1 (ref 7–25)
CALCIUM SPEC-SCNC: 9.6 MG/DL (ref 8.6–10.5)
CHLORIDE SERPL-SCNC: 102 MMOL/L (ref 98–107)
CHOLEST SERPL-MCNC: 163 MG/DL (ref 0–200)
CO2 SERPL-SCNC: 25.6 MMOL/L (ref 22–29)
CREAT SERPL-MCNC: 0.99 MG/DL (ref 0.76–1.27)
CREAT UR-MCNC: 141.4 MG/DL
EGFRCR SERPLBLD CKD-EPI 2021: 93.4 ML/MIN/1.73
GLOBULIN UR ELPH-MCNC: 3.1 GM/DL
GLUCOSE SERPL-MCNC: 59 MG/DL (ref 65–99)
HBA1C MFR BLD: 5.7 % (ref 4.8–5.6)
HDLC SERPL-MCNC: 43 MG/DL (ref 40–60)
LDLC SERPL CALC-MCNC: 98 MG/DL (ref 0–100)
LDLC/HDLC SERPL: 2.23 {RATIO}
MICROALBUMIN/CREAT UR: NORMAL MG/G{CREAT}
POTASSIUM SERPL-SCNC: 4.2 MMOL/L (ref 3.5–5.2)
PROT SERPL-MCNC: 7.3 G/DL (ref 6–8.5)
SODIUM SERPL-SCNC: 139 MMOL/L (ref 136–145)
TRIGL SERPL-MCNC: 120 MG/DL (ref 0–150)
VLDLC SERPL-MCNC: 22 MG/DL (ref 5–40)

## 2022-09-28 RX ORDER — OMEPRAZOLE 20 MG/1
CAPSULE, DELAYED RELEASE ORAL
Qty: 90 CAPSULE | Refills: 0 | Status: SHIPPED | OUTPATIENT
Start: 2022-09-28 | End: 2022-12-05

## 2022-10-11 ENCOUNTER — TELEPHONE (OUTPATIENT)
Dept: INTERNAL MEDICINE | Facility: CLINIC | Age: 47
End: 2022-10-11

## 2022-10-11 NOTE — TELEPHONE ENCOUNTER
I CALLED AND GOT NO ANSWER BUT I DID GO HEAD AND SEND  IN FOR HIM PAXLOVID  PLEASE CALL AND TELL HIM THAT PAXLOVID CAN CAUSE CHANGES IN TASTE AND MANY OF THE OTHER SYMPTOMS OF COVID BUT I WOULD STILL RECOMMEND HIM TAKE IT AND HE NEEDS TO GIVE VERBAL CONSENT THAT HE UNDERSTANDS

## 2022-10-11 NOTE — TELEPHONE ENCOUNTER
Caller: Sumanth Alfaro    Relationship to patient: Self    Best call back number:[phone]    Date of positive COVID19 test: 10/11/2022    Date if possible COVID19 exposure:   10/08/2022 FROM WIFE     COVID19 symptoms:   COUGH, CONGESTION, HEADACHE, FATIGUE AND LETHARGIC     Date of initial quarantine: 10/11/2022    Additional information or concerns:   PATIENT WOULD LIKE FOR PAXLOVID TO BE CALLED INTO THE PHARMACY TO HELP WITH COVID SYMPTOMS     What is the patients preferred pharmacy:   [address] [phone] [facility] [phone]   [phone]

## 2022-10-21 ENCOUNTER — TELEMEDICINE (OUTPATIENT)
Dept: INTERNAL MEDICINE | Facility: CLINIC | Age: 47
End: 2022-10-21

## 2022-10-21 DIAGNOSIS — E11.69 TYPE 2 DIABETES MELLITUS WITH MORBID OBESITY: ICD-10-CM

## 2022-10-21 DIAGNOSIS — E66.01 TYPE 2 DIABETES MELLITUS WITH MORBID OBESITY: ICD-10-CM

## 2022-10-21 DIAGNOSIS — J40 BRONCHITIS: Primary | ICD-10-CM

## 2022-10-21 PROCEDURE — 99214 OFFICE O/P EST MOD 30 MIN: CPT | Performed by: PHYSICIAN ASSISTANT

## 2022-10-21 RX ORDER — METHYLPREDNISOLONE 4 MG/1
TABLET ORAL
Qty: 21 EACH | Refills: 0 | Status: SHIPPED | OUTPATIENT
Start: 2022-10-21 | End: 2022-12-16

## 2022-10-21 RX ORDER — BENZONATATE 100 MG/1
100 CAPSULE ORAL NIGHTLY PRN
Qty: 30 CAPSULE | Refills: 1 | Status: SHIPPED | OUTPATIENT
Start: 2022-10-21 | End: 2022-12-16

## 2022-10-21 NOTE — PROGRESS NOTES
[facility] Internal Medicine    Sumanth Alfaro  1975   [id number]      Patient Care Team:  Austyn Carl MD as PCP - General (Internal Medicine)    Chief Complaint::   Chief Complaint   Patient presents with   • Bronchitis    You have chosen to receive care through a video visit. Do you consent to use a video visit with Angélica CARRION at [facility] for your medical care today? Yes    HPI  Austyn Alfaro is a 47-year-old male date of birth 1975 who presents today via telemedicine for follow-up of COVID-19.  He reports he tested positive on October 9.  Symptoms included fatigue, fever for 2 days, congestion and cough.  He initially called for prescription of Paxlovid, but did not take it due to feeling better.   Today, he presents with continued cough and congestion.  Some tightness and shortness of breath.  He denies body aches or fever.  He is diabetic with most recent A1c less than 6%.    Patient Active Problem List   Diagnosis   • Esophagitis, reflux   • Pain in lower back   • Hyperlipidemia LDL goal <70   • Dysuria   • Morbid obesity due to excess calories (HCC)   • Acute bacterial rhinosinusitis   • Renal stone   • Benign prostatic hyperplasia (BPH) with urinary urgency   • Hypertension, benign   • Mild episode of recurrent major depressive disorder (HCC)   • Allergic rhinitis   • Preventative health care   • Type 2 diabetes mellitus with morbid obesity (HCC)   • Vitamin D deficiency   • Hx of colonic polyp   • Bronchitis        Past Medical History:   Diagnosis Date   • Abdominal pain    • Acute cholecystitis    • Carpal tunnel syndrome     1/2020 had staph infection of right wrist after surgery   • GERD (gastroesophageal reflux disease)    • Headache     Dr Clement for headaches-referred to ENT   • Hx of colonic polyp 9/9/2022    Per Dr walsh - use konsyl    • Hypertension    • Kidney stone     per Navas   • Left shoulder pain    • Nephrolithiasis    •  Sphincter of Oddi dysfunction    • Type 2 diabetes mellitus with morbid obesity (HCC) 2/24/2021 2/2021   • Vitamin D deficiency 2/24/2021       Past Surgical History:   Procedure Laterality Date   • CARPAL TUNNEL RELEASE Bilateral 2003   • CARPAL TUNNEL RELEASE Right 01/2020   • CARPAL TUNNEL RELEASE Left 11/2019   • CHOLECYSTECTOMY  2010    laparoscopic-per DR Adams    • COLONOSCOPY  11/2009    mild ulcer DR Hale   • SHOULDER ARTHROSCOPY  2009    DR [email or web address]   • SINUS SURGERY  2018    Dr Salmon did sinus procedure cleaning out sinuses       Family History   Problem Relation Age of Onset   • Obesity Father    • Coronary artery disease Father    • Hypertension Father    • Diabetes Father    • Hyperlipidemia Father    • Cancer Other         barthrolin gland   • Obesity Other        Social History     Socioeconomic History   • Marital status:    Tobacco Use   • Smoking status: Never   • Smokeless tobacco: Never   Substance and Sexual Activity   • Alcohol use: No   • Drug use: No   • Sexual activity: Defer       Allergies   Allergen Reactions   • Sulfa Antibiotics Rash       Review of Systems   Constitutional: Negative for fatigue and fever.   HENT: Positive for congestion.    Respiratory: Positive for cough and shortness of breath. Negative for wheezing.    Cardiovascular: Negative for chest pain and leg swelling.   Endocrine: Negative for cold intolerance and heat intolerance.   Musculoskeletal: Negative for arthralgias and myalgias.        Vital Signs  There were no vitals filed for this visit.  There is no height or weight on file to calculate BMI.          Current Outpatient Medications:   •  benzonatate (Tessalon Perles) 100 MG capsule, Take 1 capsule by mouth At Night As Needed for Cough., Disp: 30 capsule, Rfl: 1  •  Cholecalciferol (Vitamin D3) 50 MCG (2000 UT) capsule, Take 1 capsule by mouth Daily., Disp: 30 capsule, Rfl: 11  •  dapagliflozin Propanediol (Farxiga) 10 MG tablet, Take 10 mg by mouth  Daily., Disp: 90 tablet, Rfl: 1  •  fluticasone (Flonase) 50 MCG/ACT nasal spray, 2 sprays into the nostril(s) as directed by provider Daily., Disp: 16 mL, Rfl: 11  •  lisinopril (PRINIVIL,ZESTRIL) 20 MG tablet, Take 1 tablet by mouth Daily., Disp: 90 tablet, Rfl: 1  •  methylPREDNISolone (MEDROL) 4 MG dose pack, Take as directed on package instructions., Disp: 21 each, Rfl: 0  •  omeprazole (priLOSEC) 20 MG capsule, Take 1 capsule by mouth once daily, Disp: 90 capsule, Rfl: 0  •  Potassium Citrate ER (Urocit-K 15) 15 MEQ (1620 MG) tablet controlled-release, Take 15 mEq by mouth Every 12 (Twelve) Hours., Disp: 180 tablet, Rfl: 1  •  Psyllium (Konsyl-D) 52.3 % powder, Take with breakfast and supper, Disp: , Rfl:   •  rosuvastatin (Crestor) 10 MG tablet, Take 1 tablet by mouth Daily., Disp: 90 tablet, Rfl: 1  •  tamsulosin (FLOMAX) 0.4 MG capsule 24 hr capsule, Take 1 capsule by mouth once daily, Disp: 90 capsule, Rfl: 0  •  venlafaxine XR (EFFEXOR-XR) 75 MG 24 hr capsule, TAKE 1 CAPSULE BY MOUTH ONCE DAILY WITH FOOD, Disp: 90 capsule, Rfl: 0    Physical Exam  Vitals reviewed.   Constitutional:       Appearance: Normal appearance.   HENT:      Head: Normocephalic and atraumatic.      Nose: Nose normal.   Eyes:      Pupils: Pupils are equal, round, and reactive to light.   Skin:     General: Skin is warm.      Coloration: Skin is not jaundiced.   Neurological:      Mental Status: He is alert.   Psychiatric:         Mood and Affect: Mood normal.         Behavior: Behavior normal.         Thought Content: Thought content normal.         Judgment: Judgment normal.          ACE III MINI            Results Review:    No results found for this or any previous visit (from the past 672 hour(s)).  Procedures    Medication Review: Medications reviewed and noted    Social History     Socioeconomic History   • Marital status:    Tobacco Use   • Smoking status: Never   • Smokeless tobacco: Never   Substance and Sexual  Activity   • Alcohol use: No   • Drug use: No   • Sexual activity: Defer        Assessment/Plan:    Problem List Items Addressed This Visit        Pulmonary and Pneumonias    Bronchitis - Primary    Overview     Continued cough and congestion following COVID.  Short course of steroids.  Patient advised to continue to monitor blood sugars throughout course.  Tessalon Perles for cough.  Continue to drink plenty of water.         Relevant Medications    fluticasone (Flonase) 50 MCG/ACT nasal spray    benzonatate (Tessalon Perles) 100 MG capsule    methylPREDNISolone (MEDROL) 4 MG dose pack       Other    Type 2 diabetes mellitus with morbid obesity (HCC)    Overview     2/2021  Continue Farxiga 10 mg daily.         Relevant Medications    dapagliflozin Propanediol (Farxiga) 10 MG tablet    This visit has been rescheduled as a video visit to comply with patient safety concerns in accordance with CDC recommendations. Total time of discussion was 15 minutes.      There are no Patient Instructions on file for this visit.     Plan of care reviewed with patient at the conclusion of today's visit. Education was provided regarding diagnosis, management, and any prescribed or recommended OTC medications.Patient verbalizes understanding of and agreement with management plan.         I spent 19 minutes caring for Sumanth on this date of service. This time includes time spent by me in the following activities:preparing for the visit, reviewing tests, obtaining and/or reviewing a separately obtained history, performing a medically appropriate examination and/or evaluation , counseling and educating the patient/family/caregiver, ordering medications, tests, or procedures, referring and communicating with other health care professionals  and documenting information in the medical record    Angélica Andrew PA-C      Note: Part of this note may be an electronic transcription/translation of spoken language to printed text using the  Dragon Dictation system.

## 2022-12-05 RX ORDER — OMEPRAZOLE 20 MG/1
CAPSULE, DELAYED RELEASE ORAL
Qty: 90 CAPSULE | Refills: 0 | Status: SHIPPED | OUTPATIENT
Start: 2022-12-05 | End: 2023-03-03

## 2022-12-05 RX ORDER — TAMSULOSIN HYDROCHLORIDE 0.4 MG/1
CAPSULE ORAL
Qty: 90 CAPSULE | Refills: 0 | Status: SHIPPED | OUTPATIENT
Start: 2022-12-05 | End: 2023-03-03

## 2022-12-06 RX ORDER — VENLAFAXINE HYDROCHLORIDE 75 MG/1
CAPSULE, EXTENDED RELEASE ORAL
Qty: 90 CAPSULE | Refills: 1 | Status: SHIPPED | OUTPATIENT
Start: 2022-12-06

## 2022-12-16 ENCOUNTER — OFFICE VISIT (OUTPATIENT)
Dept: INTERNAL MEDICINE | Facility: CLINIC | Age: 47
End: 2022-12-16

## 2022-12-16 VITALS
TEMPERATURE: 98 F | OXYGEN SATURATION: 95 % | SYSTOLIC BLOOD PRESSURE: 110 MMHG | WEIGHT: 298 LBS | HEIGHT: 69 IN | DIASTOLIC BLOOD PRESSURE: 76 MMHG | HEART RATE: 78 BPM | BODY MASS INDEX: 44.14 KG/M2

## 2022-12-16 DIAGNOSIS — E66.01 TYPE 2 DIABETES MELLITUS WITH MORBID OBESITY: Primary | ICD-10-CM

## 2022-12-16 DIAGNOSIS — E11.69 TYPE 2 DIABETES MELLITUS WITH MORBID OBESITY: Primary | ICD-10-CM

## 2022-12-16 DIAGNOSIS — I10 HYPERTENSION, BENIGN: ICD-10-CM

## 2022-12-16 DIAGNOSIS — J40 BRONCHITIS: ICD-10-CM

## 2022-12-16 LAB
EXPIRATION DATE: NORMAL
HBA1C MFR BLD: 6.3 %
Lab: NORMAL

## 2022-12-16 PROCEDURE — 99214 OFFICE O/P EST MOD 30 MIN: CPT | Performed by: PHYSICIAN ASSISTANT

## 2022-12-16 PROCEDURE — 83036 HEMOGLOBIN GLYCOSYLATED A1C: CPT | Performed by: PHYSICIAN ASSISTANT

## 2022-12-16 RX ORDER — AZITHROMYCIN 250 MG/1
TABLET, FILM COATED ORAL
Qty: 6 TABLET | Refills: 0 | Status: SHIPPED | OUTPATIENT
Start: 2022-12-16 | End: 2022-12-22

## 2022-12-16 RX ORDER — GUAIFENESIN AND CODEINE PHOSPHATE 100; 10 MG/5ML; MG/5ML
5 SOLUTION ORAL NIGHTLY PRN
Qty: 120 ML | Refills: 0 | Status: SHIPPED | OUTPATIENT
Start: 2022-12-16 | End: 2022-12-22

## 2022-12-16 RX ORDER — PREDNISONE 20 MG/1
TABLET ORAL
COMMUNITY
Start: 2022-12-13 | End: 2022-12-18

## 2022-12-16 RX ORDER — DEXTROMETHORPHAN HYDROBROMIDE AND PROMETHAZINE HYDROCHLORIDE 15; 6.25 MG/5ML; MG/5ML
SYRUP ORAL
COMMUNITY
Start: 2022-12-13 | End: 2022-12-18

## 2022-12-16 RX ORDER — MONTELUKAST SODIUM 4 MG/1
4 TABLET, CHEWABLE ORAL NIGHTLY
Qty: 30 TABLET | Refills: 2 | Status: SHIPPED | OUTPATIENT
Start: 2022-12-16

## 2022-12-16 NOTE — PROGRESS NOTES
[facility] Internal Medicine    Sumanth Alfaro  1975   [id number]      Patient Care Team:  Austyn Carl MD as PCP - General (Internal Medicine)    Chief Complaint::   Chief Complaint   Patient presents with   • Cough   • Nasal Congestion     Seen at [facility] 4 days ago           HPI  COVID 10/11/2022, telemedicine visit on 10/21. He completed medrol dose pack, which he did improve.  Several weeks ago, he developed sore throat, drainage, and cough.  He has scant phlegm.  He now has fatigue due to not sleeping.   Went [facility] 1 week ago and was prescribed prednisone 20mg twice daily for 5 days.  Promethazine DM.  Continues to cough with some phlegm.      Patient Active Problem List   Diagnosis   • Esophagitis, reflux   • Pain in lower back   • Hyperlipidemia LDL goal <70   • Dysuria   • Morbid obesity due to excess calories (HCC)   • Acute bacterial rhinosinusitis   • Renal stone   • Benign prostatic hyperplasia (BPH) with urinary urgency   • Hypertension, benign   • Mild episode of recurrent major depressive disorder (HCC)   • Allergic rhinitis   • Preventative health care   • Type 2 diabetes mellitus with morbid obesity (HCC)   • Vitamin D deficiency   • Hx of colonic polyp   • Bronchitis        Past Medical History:   Diagnosis Date   • Abdominal pain    • Acute cholecystitis    • Carpal tunnel syndrome     1/2020 had staph infection of right wrist after surgery   • GERD (gastroesophageal reflux disease)    • Headache     Dr Clement for headaches-referred to ENT   • Hx of colonic polyp 9/9/2022    Per Dr waslh - use konsyl    • Hypertension    • Kidney stone     per Navas   • Left shoulder pain    • Nephrolithiasis    • Sphincter of Oddi dysfunction    • Type 2 diabetes mellitus with morbid obesity (HCC) 2/24/2021 2/2021   • Vitamin D deficiency 2/24/2021       Past Surgical History:   Procedure Laterality Date   • CARPAL TUNNEL RELEASE Bilateral 2003   • CARPAL TUNNEL RELEASE Right 01/2020  "  • CARPAL TUNNEL RELEASE Left 11/2019   • CHOLECYSTECTOMY  2010    laparoscopic-per DR Adams    • COLONOSCOPY  11/2009    mild ulcer DR Hale   • SHOULDER ARTHROSCOPY  2009    DR [email or web address]   • SINUS SURGERY  2018    Dr Salmon did sinus procedure cleaning out sinuses       Family History   Problem Relation Age of Onset   • Obesity Father    • Coronary artery disease Father    • Hypertension Father    • Diabetes Father    • Hyperlipidemia Father    • Cancer Other         barthrolin gland   • Obesity Other        Social History     Socioeconomic History   • Marital status:    Tobacco Use   • Smoking status: Never   • Smokeless tobacco: Never   Substance and Sexual Activity   • Alcohol use: No   • Drug use: No   • Sexual activity: Defer       Allergies   Allergen Reactions   • Sulfa Antibiotics Rash       Review of Systems   Constitutional: Negative for chills, fatigue and fever.   HENT: Positive for congestion.    Respiratory: Positive for cough, chest tightness and wheezing. Negative for shortness of breath.    Cardiovascular: Negative for chest pain.   Gastrointestinal: Negative for nausea.        Vital Signs  Vitals:    12/16/22 1329   BP: 110/76   BP Location: Left arm   Patient Position: Sitting   Cuff Size: Large Adult   Pulse: 78   Temp: 98 °F (36.7 °C)   TempSrc: Temporal   SpO2: 95%   Weight: 135 kg (298 lb)   Height: 175.3 cm (69.02\")   PainSc: 0-No pain     Body mass index is 43.99 kg/m².  Class 3 Severe Obesity (BMI >=40). Obesity-related health conditions include the following: diabetes mellitus. Obesity is worsening. BMI is is above average; BMI management plan is completed. We discussed low calorie, low carb based diet program, portion control and increasing exercise.     Advance Care Planning   ACP discussion was held with the patient during this visit. Patient does not have an advance directive, information provided.       Current Outpatient Medications:   •  Cholecalciferol (Vitamin D3) 50 MCG " (2000 UT) capsule, Take 1 capsule by mouth Daily., Disp: 30 capsule, Rfl: 11  •  dapagliflozin Propanediol (Farxiga) 10 MG tablet, Take 10 mg by mouth Daily., Disp: 90 tablet, Rfl: 1  •  fluticasone (Flonase) 50 MCG/ACT nasal spray, 2 sprays into the nostril(s) as directed by provider Daily., Disp: 16 mL, Rfl: 11  •  lisinopril (PRINIVIL,ZESTRIL) 20 MG tablet, Take 1 tablet by mouth Daily., Disp: 90 tablet, Rfl: 1  •  omeprazole (priLOSEC) 20 MG capsule, Take 1 capsule by mouth once daily, Disp: 90 capsule, Rfl: 0  •  Potassium Citrate ER (Urocit-K 15) 15 MEQ (1620 MG) tablet controlled-release, Take 15 mEq by mouth Every 12 (Twelve) Hours., Disp: 180 tablet, Rfl: 1  •  Psyllium (Konsyl-D) 52.3 % powder, Take with breakfast and supper, Disp: , Rfl:   •  rosuvastatin (Crestor) 10 MG tablet, Take 1 tablet by mouth Daily., Disp: 90 tablet, Rfl: 1  •  tamsulosin (FLOMAX) 0.4 MG capsule 24 hr capsule, Take 1 capsule by mouth once daily, Disp: 90 capsule, Rfl: 0  •  venlafaxine XR (EFFEXOR-XR) 75 MG 24 hr capsule, TAKE 1 CAPSULE BY MOUTH ONCE DAILY WITH FOOD, Disp: 90 capsule, Rfl: 1  •  azithromycin (Zithromax Z-Ankush) 250 MG tablet, Take 2 tablets by mouth on day 1, then 1 tablet daily on days 2-5, Disp: 6 tablet, Rfl: 0  •  guaiFENesin-codeine (GUAIFENESIN AC) 100-10 MG/5ML liquid, Take 5 mL by mouth At Night As Needed for Cough., Disp: 120 mL, Rfl: 0  •  montelukast (Singulair) 4 MG chewable tablet, Chew 1 tablet Every Night., Disp: 30 tablet, Rfl: 2    Physical Exam  Vitals and nursing note reviewed.   Constitutional:       General: He is not in acute distress.     Appearance: He is well-developed. He is not diaphoretic.   HENT:      Head: Normocephalic and atraumatic.      Nose: Nose normal.   Neck:      Vascular: No JVD.   Cardiovascular:      Rate and Rhythm: Normal rate and regular rhythm.      Pulses: Normal pulses.      Heart sounds: Normal heart sounds. No murmur heard.  Pulmonary:      Effort: Pulmonary effort  is normal. No respiratory distress.      Breath sounds: No stridor. Wheezing present.   Musculoskeletal:      Cervical back: Neck supple.   Lymphadenopathy:      Cervical: No cervical adenopathy.   Skin:     General: Skin is warm and dry.   Neurological:      General: No focal deficit present.      Mental Status: He is oriented to person, place, and time.   Psychiatric:         Mood and Affect: Mood normal.         Behavior: Behavior normal.          ACE III MINI            Results Review:    Recent Results (from the past 672 hour(s))   POC Glycosylated Hemoglobin (Hb A1C)    Collection Time: 12/16/22  2:07 PM    Specimen: Blood   Result Value Ref Range    Hemoglobin A1C 6.3 %    Lot Number [id number]     Expiration Date 10/17/2024      Procedures    Medication Review: Medications reviewed and noted    Social History     Socioeconomic History   • Marital status:    Tobacco Use   • Smoking status: Never   • Smokeless tobacco: Never   Substance and Sexual Activity   • Alcohol use: No   • Drug use: No   • Sexual activity: Defer        Assessment/Plan:    Problem List Items Addressed This Visit        Cardiac and Vasculature    Hypertension, benign    Overview     Appears to be controlled well.  Continue lisinopril 20 mg tablets daily.         Relevant Medications    lisinopril (PRINIVIL,ZESTRIL) 20 MG tablet       Pulmonary and Pneumonias    Bronchitis    Overview     Continued cough and congestion following COVID.  He has completed 2 rounds of steroids.  No relief of coughing with Tessalon Perles or Promethazine DM.    Zithromax sent to pharmacy.    Guaifenesin with codeine sent to pharmacy.   Continue to drink plenty of water.         Relevant Medications    fluticasone (Flonase) 50 MCG/ACT nasal spray    azithromycin (Zithromax Z-Ankush) 250 MG tablet    guaiFENesin-codeine (GUAIFENESIN AC) 100-10 MG/5ML liquid    montelukast (Singulair) 4 MG chewable tablet       Other    Type 2 diabetes mellitus with morbid  obesity (HCC) - Primary    Overview     Continue Farxiga 10 mg daily.  A1C 6.3% today in the office.  Will hold on steroids.         Relevant Medications    dapagliflozin Propanediol (Farxiga) 10 MG tablet        Plan of care reviewed with patient at the conclusion of today's visit. Education was provided regarding diagnosis, management, and any prescribed or recommended OTC medications.Patient verbalizes understanding of and agreement with management plan.         I spent 21 minutes caring for Sumanth on this date of service. This time includes time spent by me in the following activities:preparing for the visit, reviewing tests, obtaining and/or reviewing a separately obtained history, performing a medically appropriate examination and/or evaluation , counseling and educating the patient/family/caregiver, ordering medications, tests, or procedures, referring and communicating with other health care professionals  and documenting information in the medical record    Angélica Andrew PA-C      Note: Part of this note may be an electronic transcription/translation of spoken language to printed text using the Dragon Dictation system.

## 2022-12-22 ENCOUNTER — TELEMEDICINE (OUTPATIENT)
Dept: FAMILY MEDICINE CLINIC | Facility: TELEHEALTH | Age: 47
End: 2022-12-22

## 2022-12-22 ENCOUNTER — E-VISIT (OUTPATIENT)
Dept: ADMINISTRATIVE | Facility: OTHER | Age: 47
End: 2022-12-22

## 2022-12-22 DIAGNOSIS — Z20.818 EXPOSURE TO STREP THROAT: Primary | ICD-10-CM

## 2022-12-22 PROBLEM — G56.03 BILATERAL CARPAL TUNNEL SYNDROME: Status: ACTIVE | Noted: 2022-12-22

## 2022-12-22 PROBLEM — N40.0 BENIGN PROSTATIC HYPERPLASIA: Status: ACTIVE | Noted: 2017-08-11

## 2022-12-22 PROCEDURE — 99213 OFFICE O/P EST LOW 20 MIN: CPT | Performed by: NURSE PRACTITIONER

## 2022-12-23 RX ORDER — AMOXICILLIN AND CLAVULANATE POTASSIUM 875; 125 MG/1; MG/1
1 TABLET, FILM COATED ORAL 2 TIMES DAILY
Qty: 20 TABLET | Refills: 0 | Status: SHIPPED | OUTPATIENT
Start: 2022-12-23 | End: 2023-01-02

## 2022-12-23 NOTE — E-VISIT ESCALATED
Chief Complaint: Coronavirus (COVID-19), cold, sinus pain, allergy, or flu   Patient was shown the following escalation message:   Some conditions need a visit with a healthcare provider   Because you have a sore throat, fever, and muffled voice, you should speak with a provider to get care.   If you start to have trouble breathing, go directly to the nearest emergency room.   Otherwise, select an option below.   ----------   Patient Interview Transcript:   Please carefully consider each question and answer as best you can. This helps your provider give you the best care. Which of these symptoms are bothering you? Select all that apply.    Fever    Sore throat    Chills   Not selected:    Cough    Shortness of breath    Stuffed-up nose or sinuses    Runny nose    Itchy or watery eyes    Itchy nose or sneezing    Loss of smell or taste    Hoarse voice or loss of voice    Headache    Sweats    Muscle or body aches    Fatigue or tiredness    Nausea or vomiting    Diarrhea    I don't have any of these symptoms   Since your current symptoms started, have you been tested for COVID-19? Select one.    Yes   Not selected:    No   When was your most recent COVID-19 test? Select one.    Within the last week (specify date as MM/DD/YY): 12/20/22   Not selected:    7 to 14 days ago    15 to 30 days ago    More than 1 month ago   What type of COVID-19 test did you most recently have? There are two types of COVID-19 tests: - Viral tests check if you're currently infected with COVID-19. For these tests, a nose swab or saliva sample is taken. Viral tests include self-tests and tests done at a doctor's office, lab, or testing site. - Antibody tests check if you've been infected in the past. For these tests, your blood is drawn. Antibody tests can only be done at a doctor's office, lab, or testing site. Select one.    Viral self-test   Not selected:    Viral test at a doctor's office, lab, or testing site    Antibody test   What was  "the result of your most recent COVID-19 test? Select one.    Negative   Not selected:    Positive    I'm not sure   Have you gotten the COVID-19 vaccine? Select one.    Yes   Not selected:    No   How many total doses of the COVID-19 vaccine have you gotten? This includes boosters as well as additional doses for those who are immunocompromised. Select one.    3 doses   Not selected:    1 dose    2 doses    4 doses    5 doses   1st dose    Moderna   Not selected:    J&J/Rosy    Novavax    Pfizer   2nd dose    Moderna   Not selected:    J&J/Rosy    Novavax    Pfizer   3rd dose    Moderna   Not selected:    J&J/Rosy    Novavax    Pfizer   When did you get your most recent dose of the COVID-19 vaccine?    More than 14 days ago   Not selected:    Less than 48 hours (2 days) ago    48 to 72 hours (3 days) ago    3 to 5 days ago    5 to 7 days ago    7 to 14 days ago   In the last 14 days, have you traveled outside of your local community? This includes travel by car, RV, bus, train, or plane. Travel increases your chances of getting and spreading COVID-19. Select one.    No   Not selected:    Yes   In the last 14 days, have you had close contact with someone who has coronavirus (COVID-19)? \"Close contact\" means any of these: - Living in the same household as someone with COVID-19. - Caring for someone with COVID-19. - Being within 6 feet of someone with COVID-19 for a total of at least 15 minutes over a 24-hour period. For example, three 5-minute exposures for a total of 15 minutes. - Being in direct contact with respiratory droplets from someone with COVID-19 (being coughed on, kissing, sharing utensils). Select one.    No, not that I know of   Not selected:    Yes, a confirmed case    Yes, a suspected case   When did your current symptoms start? Select one.    Less than 48 hours ago   Not selected:    3 to 5 days ago    6 to 9 days ago    10 to 14 days ago    2 to 3 weeks ago    3 to 4 weeks ago    More than a " month ago   Do you know the exact date your symptoms started? If so, enter the date as MM/DD/YY. Select one.    No   Not selected:    Yes (specify)   Did your symptoms come on suddenly or gradually? Select one.    Gradually   Not selected:    Suddenly    I'm not sure   You mentioned having a fever. Do you have a fever now? Select one.    Yes, but I didn't have one when my symptoms started   Not selected:    Yes, and I've had one since my symptoms started    No, it's gone now   Did you take your temperature with a thermometer? Select one.    Yes   Not selected:    No, but it felt mild    No, but it felt high   What was the highest reading on the thermometer? Select one.    100.4 to 101.5F   Not selected:    Below 100.4F    101.6 to 101.9F    102.0 to 103.0F    Above 103.0F   How long have you had a fever? Select one.    Less than 24 hours   Not selected:    1 to 3 days    4 or more days   Can you swallow liquids and solid foods? A sore throat may be painful when swallowing, but it shouldn't prevent you from swallowing. Select one.    Yes, but it's uncomfortable   Not selected:    Yes, with ease    Yes, but it's painful    It's hard to swallow anything because it feels like liquids and food get stuck in my throat    No, I can't swallow anything, liquid or solid foods   Since your symptoms started, have you felt dizzy? Select one.    No   Not selected:    Yes, but I can continue with my regular daily activities    Yes, and it makes it hard to stand, walk, or do daily activities   Do you have chest pain? You might also feel it as discomfort, aching, tightness, or squeezing in the chest. Select one.    No   Not selected:    Yes   Have you urinated at least 3 times in the last 24 hours? Select one.    Yes   Not selected:    No   Changes in alertness or awareness may mean you need emergency care. Since your symptoms started, have you had any of these? Select all that apply.    None of the above   Not selected:    " Confusion    Slurred speech    Not knowing where you are or what day it is    Difficulty staying conscious    Fainting or passing out   Do your symptoms include a whistling sound, or wheezing, when you breathe? Select one.    No   Not selected:    Yes    I'm not sure   Do you have any of these symptoms in your ear(s)? Select all that apply.    None of the above   Not selected:    Pain    Pressure    Fullness    Crackling or popping    Plugged or blocked sensation   Can you move your chin toward your chest?    Yes   Not selected:    No, my neck is too stiff   Try opening your mouth as wide as you can. Are you able to open your mouth all the way as you normally would? Select one.    Yes   Not selected:    No   Does your voice sound muffled? \"Muffled\" here does not mean hoarse or scratchy. By \"muffled,\" we mean that it sounds like you're speaking with a mouth full of hot food. Select one.    Yes   Not selected:    No, not that I can tell   ----------   Medical history   Medical history data does not currently exist for this patient.    "

## 2022-12-23 NOTE — PROGRESS NOTES
You have chosen to receive care through a telehealth visit.  Do you consent to use a video/audio connection for your medical care today? Yes     CHIEF COMPLAINT  Chief Complaint   Patient presents with   • Sore Throat         HPI  Sumanth Alfaro is a 47 y.o. male  presents with complaint of sore throat. Reports his wife and daughter have strep throat. Reports he recently had bronchitis. Reports bronchitis started 3 weeks ago. Reports hurts to swallow. Reports he had a zpack and he was feeling better and now his symptoms started again. Feels feverish. + chills. Reports his at home COVID test was negative. Reports he has not taken any OTC medications for his symptoms.     Review of Systems   Constitutional: Positive for fever. Negative for chills and fatigue.   HENT: Positive for sinus pressure and sore throat. Negative for congestion, ear discharge, ear pain and sinus pain.    Respiratory: Positive for cough. Negative for chest tightness, shortness of breath and wheezing.         Mild cough   Cardiovascular: Negative for chest pain.   Gastrointestinal: Negative for abdominal pain, diarrhea, nausea and vomiting.   Musculoskeletal: Negative for back pain and myalgias.   Neurological: Negative for dizziness and headaches.   Psychiatric/Behavioral: Negative.        Past Medical History:   Diagnosis Date   • Abdominal pain    • Acute cholecystitis    • Carpal tunnel syndrome     1/2020 had staph infection of right wrist after surgery   • GERD (gastroesophageal reflux disease)    • Headache     Dr Clement for headaches-referred to ENT   • Hx of colonic polyp 9/9/2022    Per Dr walsh - use konsyl    • Hypertension    • Kidney stone     per Navas   • Left shoulder pain    • Nephrolithiasis    • Sphincter of Oddi dysfunction    • Type 2 diabetes mellitus with morbid obesity (HCC) 2/24/2021 2/2021   • Vitamin D deficiency 2/24/2021       Family History   Problem Relation Age of Onset   • Obesity Father    •  Coronary artery disease Father    • Hypertension Father    • Diabetes Father    • Hyperlipidemia Father    • Cancer Other         barthrolin gland   • Obesity Other        Social History     Socioeconomic History   • Marital status:    Tobacco Use   • Smoking status: Never   • Smokeless tobacco: Never   Substance and Sexual Activity   • Alcohol use: No   • Drug use: No   • Sexual activity: Defer       Sumanth Alfaro  reports that he has never smoked. He has never used smokeless tobacco.. I have educated him on the risk of diseases from using tobacco products such as cancer, COPD and heart disease.       I spent 1 minutes counseling the patient.              There were no vitals taken for this visit.    PHYSICAL EXAM  Physical Exam   Constitutional: He is oriented to person, place, and time. He appears well-developed and well-nourished. No distress.   HENT:   Head: Normocephalic and atraumatic.   Right Ear: Hearing normal.   Left Ear: Hearing normal.   Nose: No congestion. Right sinus exhibits no maxillary sinus tenderness. Left sinus exhibits no maxillary sinus tenderness.   Mouth/Throat: Mouth/Lips are normal.  Throat red with some white patches  Tonsils swollen   Eyes: Conjunctivae and lids are normal.   Pulmonary/Chest: Effort normal.  No respiratory distress.  Lymphadenopathy:        Right cervical: Anterior cervical adenopathy present.        Left cervical: Anterior cervical adenopathy present.   Mild enlarged lymph nodes   Neurological: He is alert and oriented to person, place, and time. He has normal strength.   Psychiatric: He has a normal mood and affect. His speech is normal and behavior is normal.       Results for orders placed or performed in visit on 12/16/22   POC Glycosylated Hemoglobin (Hb A1C)    Specimen: Blood   Result Value Ref Range    Hemoglobin A1C 6.3 %    Lot Number [id number]     Expiration Date 10/17/2024        Diagnoses and all orders for this visit:    1. Exposure to  strep throat (Primary)    Other orders  -     amoxicillin-clavulanate (Augmentin) 875-125 MG per tablet; Take 1 tablet by mouth 2 (Two) Times a Day for 10 days.  Dispense: 20 tablet; Refill: 0    rest  Fluids  Declined cough medication  PCP for persistant symptoms  Er for worsening symptoms such as high fever, chest pain and SOA        FOLLOW-UP  As discussed during visit with PCP/[facility] if no improvement or Urgent Care/Emergency Department if worsening of symptoms    Patient verbalizes understanding of medication dosage, comfort measures, instructions for treatment and follow-up.    Tammy Meyers, APRN  12/22/2022  23:48 EST    The use of a video visit has been reviewed with the patient and verbal informed consent has been obtained. Myself and Sumanth Alfaro participated in this visit. The patient is located in [address] [place] [address] [address] [facility].    I am located in [place], [place]. Mychart and Zoom were utilized. I spent 5 minutes in the patient's chart for this visit.

## 2023-03-03 RX ORDER — TAMSULOSIN HYDROCHLORIDE 0.4 MG/1
CAPSULE ORAL
Qty: 90 CAPSULE | Refills: 0 | Status: SHIPPED | OUTPATIENT
Start: 2023-03-03

## 2023-03-03 RX ORDER — OMEPRAZOLE 20 MG/1
CAPSULE, DELAYED RELEASE ORAL
Qty: 90 CAPSULE | Refills: 0 | Status: SHIPPED | OUTPATIENT
Start: 2023-03-03

## 2023-03-03 RX ORDER — LISINOPRIL 20 MG/1
TABLET ORAL
Qty: 90 TABLET | Refills: 0 | Status: SHIPPED | OUTPATIENT
Start: 2023-03-03

## 2023-03-14 ENCOUNTER — OFFICE VISIT (OUTPATIENT)
Dept: INTERNAL MEDICINE | Facility: CLINIC | Age: 48
End: 2023-03-14
Payer: COMMERCIAL

## 2023-03-14 ENCOUNTER — LAB (OUTPATIENT)
Dept: LAB | Facility: HOSPITAL | Age: 48
End: 2023-03-14
Payer: COMMERCIAL

## 2023-03-14 VITALS
WEIGHT: 293 LBS | SYSTOLIC BLOOD PRESSURE: 122 MMHG | DIASTOLIC BLOOD PRESSURE: 68 MMHG | HEIGHT: 69 IN | TEMPERATURE: 98.4 F | BODY MASS INDEX: 43.4 KG/M2 | HEART RATE: 74 BPM

## 2023-03-14 DIAGNOSIS — E55.9 VITAMIN D DEFICIENCY: ICD-10-CM

## 2023-03-14 DIAGNOSIS — I10 HYPERTENSION, BENIGN: ICD-10-CM

## 2023-03-14 DIAGNOSIS — E66.01 TYPE 2 DIABETES MELLITUS WITH MORBID OBESITY: ICD-10-CM

## 2023-03-14 DIAGNOSIS — E11.69 TYPE 2 DIABETES MELLITUS WITH MORBID OBESITY: ICD-10-CM

## 2023-03-14 DIAGNOSIS — Z12.5 SCREENING PSA (PROSTATE SPECIFIC ANTIGEN): ICD-10-CM

## 2023-03-14 DIAGNOSIS — G47.9 SLEEP DISTURBANCE: ICD-10-CM

## 2023-03-14 DIAGNOSIS — Z00.00 PREVENTATIVE HEALTH CARE: Primary | ICD-10-CM

## 2023-03-14 DIAGNOSIS — F33.0 MILD EPISODE OF RECURRENT MAJOR DEPRESSIVE DISORDER: ICD-10-CM

## 2023-03-14 DIAGNOSIS — E78.5 HYPERLIPIDEMIA LDL GOAL <70: ICD-10-CM

## 2023-03-14 DIAGNOSIS — R53.83 OTHER FATIGUE: ICD-10-CM

## 2023-03-14 DIAGNOSIS — E66.01 MORBID OBESITY DUE TO EXCESS CALORIES: ICD-10-CM

## 2023-03-14 LAB
ALBUMIN UR-MCNC: <1.2 MG/DL
BASOPHILS # BLD AUTO: 0.08 10*3/MM3 (ref 0–0.2)
BASOPHILS NFR BLD AUTO: 0.8 % (ref 0–1.5)
CREAT UR-MCNC: 142.9 MG/DL
DEPRECATED RDW RBC AUTO: 42.7 FL (ref 37–54)
EOSINOPHIL # BLD AUTO: 0.33 10*3/MM3 (ref 0–0.4)
EOSINOPHIL NFR BLD AUTO: 3.1 % (ref 0.3–6.2)
ERYTHROCYTE [DISTWIDTH] IN BLOOD BY AUTOMATED COUNT: 14.4 % (ref 12.3–15.4)
HBA1C MFR BLD: 6.1 % (ref 4.8–5.6)
HCT VFR BLD AUTO: 43.5 % (ref 37.5–51)
HGB BLD-MCNC: 14.1 G/DL (ref 13–17.7)
IMM GRANULOCYTES # BLD AUTO: 0.03 10*3/MM3 (ref 0–0.05)
IMM GRANULOCYTES NFR BLD AUTO: 0.3 % (ref 0–0.5)
LYMPHOCYTES # BLD AUTO: 3.74 10*3/MM3 (ref 0.7–3.1)
LYMPHOCYTES NFR BLD AUTO: 35.4 % (ref 19.6–45.3)
MCH RBC QN AUTO: 26.6 PG (ref 26.6–33)
MCHC RBC AUTO-ENTMCNC: 32.4 G/DL (ref 31.5–35.7)
MCV RBC AUTO: 82.1 FL (ref 79–97)
MICROALBUMIN/CREAT UR: NORMAL MG/G{CREAT}
MONOCYTES # BLD AUTO: 0.86 10*3/MM3 (ref 0.1–0.9)
MONOCYTES NFR BLD AUTO: 8.1 % (ref 5–12)
NEUTROPHILS NFR BLD AUTO: 5.53 10*3/MM3 (ref 1.7–7)
NEUTROPHILS NFR BLD AUTO: 52.3 % (ref 42.7–76)
NRBC BLD AUTO-RTO: 0.1 /100 WBC (ref 0–0.2)
PLATELET # BLD AUTO: 383 10*3/MM3 (ref 140–450)
PMV BLD AUTO: 9.4 FL (ref 6–12)
RBC # BLD AUTO: 5.3 10*6/MM3 (ref 4.14–5.8)
WBC NRBC COR # BLD: 10.57 10*3/MM3 (ref 3.4–10.8)

## 2023-03-14 PROCEDURE — 82306 VITAMIN D 25 HYDROXY: CPT

## 2023-03-14 PROCEDURE — 82746 ASSAY OF FOLIC ACID SERUM: CPT

## 2023-03-14 PROCEDURE — 83090 ASSAY OF HOMOCYSTEINE: CPT

## 2023-03-14 PROCEDURE — 86141 C-REACTIVE PROTEIN HS: CPT

## 2023-03-14 PROCEDURE — 80061 LIPID PANEL: CPT

## 2023-03-14 PROCEDURE — 82043 UR ALBUMIN QUANTITATIVE: CPT

## 2023-03-14 PROCEDURE — 99396 PREV VISIT EST AGE 40-64: CPT | Performed by: INTERNAL MEDICINE

## 2023-03-14 PROCEDURE — 80050 GENERAL HEALTH PANEL: CPT

## 2023-03-14 PROCEDURE — 83036 HEMOGLOBIN GLYCOSYLATED A1C: CPT

## 2023-03-14 PROCEDURE — 93000 ELECTROCARDIOGRAM COMPLETE: CPT | Performed by: INTERNAL MEDICINE

## 2023-03-14 PROCEDURE — G0103 PSA SCREENING: HCPCS

## 2023-03-14 PROCEDURE — 82570 ASSAY OF URINE CREATININE: CPT

## 2023-03-14 PROCEDURE — 82607 VITAMIN B-12: CPT

## 2023-03-14 RX ORDER — CYANOCOBALAMIN (VITAMIN B-12) 1000 MCG
TABLET ORAL
COMMUNITY

## 2023-03-14 NOTE — ASSESSMENT & PLAN NOTE
Mood is good overall. Age-appropriate Counseling:  Discussed preventative medicine issues with patient including regular exercise, healthy diet, stress reduction, adequate sleep and recommended age-appropriate screening studies.  Immunizations reviewed.

## 2023-03-14 NOTE — PROGRESS NOTES
Chief Complaint   Patient presents with   • Annual Exam     fasting   • Obesity       History of Present Illness  47 y.o. male presents for wellness exam. He has obesity and stroke risk and diabetes and needs to lose more weight     Review of Systems   Constitutional: Negative for chills and fever.   Respiratory: Negative for cough and shortness of breath.    Cardiovascular: Negative for chest pain and palpitations.   Gastrointestinal: Negative for abdominal pain, nausea and vomiting.   Skin: Negative for rash.   Neurological: Negative for dizziness, light-headedness and headaches.   Psychiatric/Behavioral: The patient is nervous/anxious.    All other systems reviewed and are negative.    .    PMSFH:  The following portions of the patient's history were reviewed and updated as appropriate: allergies, current medications, past family history, past medical history, past social history, past surgical history and problem list.      Current Outpatient Medications:   •  Cholecalciferol (Vitamin D3) 50 MCG (2000 UT) capsule, Take 1 capsule by mouth Daily., Disp: 30 capsule, Rfl: 11  •  Cyanocobalamin (B-12) 1000 MCG tablet, Take  by mouth. Once daily, Disp: , Rfl:   •  dapagliflozin Propanediol (Farxiga) 10 MG tablet, Take 10 mg by mouth Daily., Disp: 90 tablet, Rfl: 1  •  fluticasone (Flonase) 50 MCG/ACT nasal spray, 2 sprays into the nostril(s) as directed by provider Daily., Disp: 16 mL, Rfl: 11  •  lisinopril (PRINIVIL,ZESTRIL) 20 MG tablet, Take 1 tablet by mouth once daily, Disp: 90 tablet, Rfl: 0  •  montelukast (Singulair) 4 MG chewable tablet, Chew 1 tablet Every Night., Disp: 30 tablet, Rfl: 2  •  omeprazole (priLOSEC) 20 MG capsule, Take 1 capsule by mouth once daily, Disp: 90 capsule, Rfl: 0  •  Potassium Citrate ER (Urocit-K 15) 15 MEQ (1620 MG) tablet controlled-release, Take 15 mEq by mouth Every 12 (Twelve) Hours., Disp: 180 tablet, Rfl: 1  •  Psyllium (Konsyl-D) 52.3 % powder, Take with breakfast and  "supper, Disp: , Rfl:   •  rosuvastatin (Crestor) 10 MG tablet, Take 1 tablet by mouth Daily., Disp: 90 tablet, Rfl: 1  •  tamsulosin (FLOMAX) 0.4 MG capsule 24 hr capsule, Take 1 capsule by mouth once daily, Disp: 90 capsule, Rfl: 0  •  venlafaxine XR (EFFEXOR-XR) 75 MG 24 hr capsule, TAKE 1 CAPSULE BY MOUTH ONCE DAILY WITH FOOD, Disp: 90 capsule, Rfl: 1  •  Semaglutide-Weight Management 0.25 MG/0.5ML solution auto-injector, Inject 0.25 mg under the skin into the appropriate area as directed 1 (One) Time Per Week. For first month and then 0.5, Disp: 0.5 mL, Rfl: 5    VITALS:  /68   Pulse 74   Temp 98.4 °F (36.9 °C)   Ht 175.3 cm (69.02\")   Wt 133 kg (293 lb)   BMI 43.25 kg/m²     Physical Exam  Vitals and nursing note reviewed.   Constitutional:       Appearance: Normal appearance. He is well-developed.   HENT:      Head: Normocephalic.   Eyes:      Extraocular Movements: Extraocular movements intact.      Conjunctiva/sclera: Conjunctivae normal.   Cardiovascular:      Rate and Rhythm: Normal rate and regular rhythm.      Heart sounds: Normal heart sounds.   Pulmonary:      Effort: Pulmonary effort is normal. No respiratory distress.      Breath sounds: Normal breath sounds.   Abdominal:      General: Bowel sounds are normal.      Palpations: Abdomen is soft.      Tenderness: There is no abdominal tenderness.      Comments: Obese    Feet:      Right foot:      Skin integrity: Skin integrity normal. No ulcer or blister.      Left foot:      Skin integrity: Skin integrity normal. No ulcer or blister.      Comments: Diabetic Foot Exam Performed  Skin:     General: Skin is warm and dry.   Neurological:      General: No focal deficit present.      Mental Status: He is alert and oriented to person, place, and time.   Psychiatric:         Mood and Affect: Mood normal.         Behavior: Behavior normal.         LABS:    CMP:  Lab Results   Component Value Date    BUN 10 03/14/2023    CREATININE 0.85 03/14/2023    " EGFRIFNONA 90 02/25/2022     03/14/2023    K 4.4 03/14/2023     03/14/2023    CALCIUM 9.5 03/14/2023    ALBUMIN 4.2 03/14/2023    BILITOT 0.4 03/14/2023    ALKPHOS 67 03/14/2023    AST 49 (H) 03/14/2023    ALT 76 (H) 03/14/2023     CBC:  Lab Results   Component Value Date    WBC 10.57 03/14/2023    RBC 5.30 03/14/2023    HGB 14.1 03/14/2023    HCT 43.5 03/14/2023    MCV 82.1 03/14/2023    MCH 26.6 03/14/2023    MCHC 32.4 03/14/2023    RDW 14.4 03/14/2023     03/14/2023     LIPID PANEL:  Lab Results   Component Value Date    TRIG 100 03/14/2023    HDL 42 03/14/2023    VLDL 19 03/14/2023    LDL 90 03/14/2023    LDLHDL 2.12 03/14/2023     HGBA1C(LAST 2):  Lab Results   Component Value Date    HGBA1C 6.10 (H) 03/14/2023    HGBA1C 6.3 12/16/2022       ECG 12 Lead    Date/Time: 3/14/2023 11:24 AM  Performed by: Austyn Carl MD  Authorized by: Austyn Carl MD   Comparison: compared with previous ECG from 11/22/2019  Similar to previous ECG  Rhythm: sinus rhythm  Rate: normal  Conduction: conduction normal  ST Segments: ST segments normal  T Waves: T waves normal  QRS axis: normal    Clinical impression: normal ECG                 ASSESSMENT/PLAN:  Problems Addressed this Visit        Cardiac and Vasculature    Hyperlipidemia LDL goal <70     Lipid abnormalities are unchanged.  Nutritional counseling was provided. and Pharmacotherapy as ordered.  Lipids will be reassessed in 6 months.         Relevant Orders    Homocysteine (Completed)    CBC & Differential (Completed)    Comprehensive Metabolic Panel (Completed)    Lipid Panel (Completed)    TSH Rfx On Abnormal To Free T4 (Completed)    Hypertension, benign     Hypertension is unchanged.  Continue current treatment regimen.  Dietary sodium restriction.  Weight loss.  Regular aerobic exercise.  Blood pressure will be reassessed at the next regular appointment.         Relevant Orders    High Sensitivity CRP (Completed)    Microalbumin /  Creatinine Urine Ratio - Urine, Clean Catch (Completed)    ECG 12 Lead (Completed)       Endocrine and Metabolic    Morbid obesity due to excess calories (HCC)     Patient's (Body mass index is 43.25 kg/m².) indicates that they are morbidly/severely obese (BMI > 40 or > 35 with obesity - related health condition) with health conditions that include hypertension, diabetes mellitus and dyslipidemias . Weight is improving with treatment. BMI  is above average; BMI management plan is completed. We discussed portion control and increasing exercise.  He has obesity and stroke risk and diabetes and needs to lose more weight and start wegovy          Relevant Medications    Semaglutide-Weight Management 0.25 MG/0.5ML solution auto-injector    Vitamin D deficiency     Follow labs          Relevant Orders    Vitamin D,25-Hydroxy (Completed)       Health Encounters    Preventative health care - Primary     Mood is good overall. Age-appropriate Counseling:  Discussed preventative medicine issues with patient including regular exercise, healthy diet, stress reduction, adequate sleep and recommended age-appropriate screening studies.  Immunizations reviewed.                 Mental Health    Mild episode of recurrent major depressive disorder (HCC)     Patient's depression is recurrent and is mild without psychosis. Their depression is currently in partial remission and the condition is improving with treatment. This will be reassessed at the next regular appointment. F/U as described:patient will continue current medication therapy.         Relevant Medications    Semaglutide-Weight Management 0.25 MG/0.5ML solution auto-injector       Other    Type 2 diabetes mellitus with morbid obesity (HCC)     Diabetes is unchanged.   Continue current treatment regimen.  Regular aerobic exercise.  Diabetes will be reassessed in 6 months.         Relevant Orders    Hemoglobin A1c (Completed)   Other Visit Diagnoses     Sleep disturbance         Get sleep apena evaluation.     Relevant Orders    Ambulatory Referral to Sleep Medicine    Screening PSA (prostate specific antigen)        Relevant Orders    PSA Screen (Completed)    Other fatigue        Proceed with lab and get sleep evaluation.     Relevant Orders    Vitamin B12 (Completed)    Folate (Completed)      Diagnoses       Codes Comments    [facility] health care    -  Primary ICD-10-CM: Z00.00  ICD-9-CM: V70.0     Mild episode of recurrent major depressive disorder (HCC)     ICD-10-CM: F33.0  ICD-9-CM: 296.31     Morbid obesity due to excess calories (HCC)     ICD-10-CM: E66.01  ICD-9-CM: 278.01     Hypertension, benign     ICD-10-CM: I10  ICD-9-CM: 401.1     Hyperlipidemia LDL goal <70     ICD-10-CM: E78.5  ICD-9-CM: 272.4     Vitamin D deficiency     ICD-10-CM: E55.9  ICD-9-CM: 268.9     Type 2 diabetes mellitus with morbid obesity (HCC)     ICD-10-CM: E11.69, E66.01  ICD-9-CM: 250.00, 278.01     Sleep disturbance     ICD-10-CM: G47.9  ICD-9-CM: 780.50 Get sleep apena evaluation.     Screening PSA (prostate specific antigen)     ICD-10-CM: Z12.5  ICD-9-CM: V76.44     Other fatigue     ICD-10-CM: R53.83  ICD-9-CM: 780.79 Proceed with lab and get sleep evaluation.           FOLLOW-UP:  Return in about 2 months (around 5/14/2023) for Recheck, Labs this visit.      Electronically signed by:    Austyn Carl MD

## 2023-03-14 NOTE — ASSESSMENT & PLAN NOTE
Patient's (Body mass index is 43.25 kg/m².) indicates that they are morbidly/severely obese (BMI > 40 or > 35 with obesity - related health condition) with health conditions that include hypertension, diabetes mellitus and dyslipidemias . Weight is improving with treatment. BMI  is above average; BMI management plan is completed. We discussed portion control and increasing exercise.  He has obesity and stroke risk and diabetes and needs to lose more weight and start wegovy

## 2023-03-15 LAB
25(OH)D3 SERPL-MCNC: 25.4 NG/ML (ref 30–100)
ALBUMIN SERPL-MCNC: 4.2 G/DL (ref 3.5–5.2)
ALBUMIN/GLOB SERPL: 1.2 G/DL
ALP SERPL-CCNC: 67 U/L (ref 39–117)
ALT SERPL W P-5'-P-CCNC: 76 U/L (ref 1–41)
ANION GAP SERPL CALCULATED.3IONS-SCNC: 14 MMOL/L (ref 5–15)
AST SERPL-CCNC: 49 U/L (ref 1–40)
BILIRUB SERPL-MCNC: 0.4 MG/DL (ref 0–1.2)
BUN SERPL-MCNC: 10 MG/DL (ref 6–20)
BUN/CREAT SERPL: 11.8 (ref 7–25)
CALCIUM SPEC-SCNC: 9.5 MG/DL (ref 8.6–10.5)
CHLORIDE SERPL-SCNC: 105 MMOL/L (ref 98–107)
CHOLEST SERPL-MCNC: 151 MG/DL (ref 0–200)
CO2 SERPL-SCNC: 26 MMOL/L (ref 22–29)
CREAT SERPL-MCNC: 0.85 MG/DL (ref 0.76–1.27)
CRP SERPL-MCNC: 0.65 MG/DL (ref 0.01–0.5)
EGFRCR SERPLBLD CKD-EPI 2021: 106.5 ML/MIN/1.73
FOLATE SERPL-MCNC: 13.3 NG/ML (ref 4.78–24.2)
GLOBULIN UR ELPH-MCNC: 3.4 GM/DL
GLUCOSE SERPL-MCNC: 87 MG/DL (ref 65–99)
HCYS SERPL-MCNC: 10.5 UMOL/L (ref 0–15)
HDLC SERPL-MCNC: 42 MG/DL (ref 40–60)
LDLC SERPL CALC-MCNC: 90 MG/DL (ref 0–100)
LDLC/HDLC SERPL: 2.12 {RATIO}
POTASSIUM SERPL-SCNC: 4.4 MMOL/L (ref 3.5–5.2)
PROT SERPL-MCNC: 7.6 G/DL (ref 6–8.5)
PSA SERPL-MCNC: 0.71 NG/ML (ref 0–4)
SODIUM SERPL-SCNC: 145 MMOL/L (ref 136–145)
TRIGL SERPL-MCNC: 100 MG/DL (ref 0–150)
TSH SERPL DL<=0.05 MIU/L-ACNC: 1.66 UIU/ML (ref 0.27–4.2)
VIT B12 BLD-MCNC: 1085 PG/ML (ref 211–946)
VLDLC SERPL-MCNC: 19 MG/DL (ref 5–40)

## 2023-03-17 ENCOUNTER — TELEPHONE (OUTPATIENT)
Dept: INTERNAL MEDICINE | Facility: CLINIC | Age: 48
End: 2023-03-17
Payer: COMMERCIAL

## 2023-03-17 DIAGNOSIS — E66.01 MORBID OBESITY DUE TO EXCESS CALORIES: ICD-10-CM

## 2023-03-17 NOTE — TELEPHONE ENCOUNTER
Called and spoke with Celia. I corrected RX via verbal to match 0.25 weekly for one month then 0.5 thereafter.

## 2023-03-17 NOTE — TELEPHONE ENCOUNTER
BRITTANIE FROM [facility] CALLED AND THEY NEED TO TALK TO SOMEONE ABOUT THE SEMAGLUTIDE MEDICATION.  THEY THINK THEY MIGHT NEED A NEW SCRIPT.      PLEASE CALL BRITTANIE BACK [phone].

## 2023-05-19 ENCOUNTER — OFFICE VISIT (OUTPATIENT)
Dept: INTERNAL MEDICINE | Facility: CLINIC | Age: 48
End: 2023-05-19
Payer: COMMERCIAL

## 2023-05-19 VITALS
SYSTOLIC BLOOD PRESSURE: 108 MMHG | TEMPERATURE: 98.5 F | WEIGHT: 279 LBS | HEART RATE: 84 BPM | BODY MASS INDEX: 41.32 KG/M2 | DIASTOLIC BLOOD PRESSURE: 76 MMHG | HEIGHT: 69 IN

## 2023-05-19 DIAGNOSIS — E66.01 TYPE 2 DIABETES MELLITUS WITH MORBID OBESITY: ICD-10-CM

## 2023-05-19 DIAGNOSIS — E11.69 TYPE 2 DIABETES MELLITUS WITH MORBID OBESITY: ICD-10-CM

## 2023-05-19 DIAGNOSIS — I10 HYPERTENSION, BENIGN: ICD-10-CM

## 2023-05-19 DIAGNOSIS — E66.01 MORBID OBESITY DUE TO EXCESS CALORIES: Primary | ICD-10-CM

## 2023-05-19 RX ORDER — DAPAGLIFLOZIN 10 MG/1
10 TABLET, FILM COATED ORAL DAILY
Qty: 90 TABLET | Refills: 1 | Status: SHIPPED | OUTPATIENT
Start: 2023-05-19

## 2023-05-19 RX ORDER — SEMAGLUTIDE 2.4 MG/.75ML
1 INJECTION, SOLUTION SUBCUTANEOUS
Qty: 0.75 ML | Refills: 3 | Status: SHIPPED | OUTPATIENT
Start: 2023-05-19

## 2023-05-19 NOTE — TELEPHONE ENCOUNTER
Caller: [facility] [address] [facility] [address] [facility] - [phone]  - [phone] FX    Relationship: Pharmacy    Best call back number: [phone]    WHICH DOSAGE OF THE WEGOVY IS THE PATIENT SUPPOSED TO GET?  THE PRESCRIPTION WAS FOR THE 2.4MG PEN BUT THE DIRECTIONS ARE TO INJECT 1MG. PLEASE CALL AND CLARIFY DOSAGE

## 2023-05-19 NOTE — PROGRESS NOTES
Chief Complaint   Patient presents with   • Diabetes       History of Present Illness  48 y.o. male presents for follow up on diabetes and obesity. He is losing weight and overall tolerating the wegovy ok but gets hungry Thursday and would like to increase dose so will make closer to 1 week.    Review of Systems   Constitutional: Negative for chills and fever.   Respiratory: Negative for cough and shortness of breath.    Cardiovascular: Negative for chest pain and palpitations.   Gastrointestinal: Negative for abdominal pain, nausea and vomiting.   Skin: Negative for rash.   Neurological: Negative for dizziness, light-headedness and headaches.   All other systems reviewed and are negative.    .    PMSFH:  The following portions of the patient's history were reviewed and updated as appropriate: allergies, current medications, past family history, past medical history, past social history, past surgical history and problem list.      Current Outpatient Medications:   •  Cholecalciferol (Vitamin D3) 50 MCG (2000 UT) capsule, Take 1 capsule by mouth Daily., Disp: 30 capsule, Rfl: 11  •  Cyanocobalamin (B-12) 1000 MCG tablet, Take  by mouth. Once daily, Disp: , Rfl:   •  dapagliflozin Propanediol (Farxiga) 10 MG tablet, Take 10 mg by mouth Daily., Disp: 90 tablet, Rfl: 1  •  fluticasone (Flonase) 50 MCG/ACT nasal spray, 2 sprays into the nostril(s) as directed by provider Daily., Disp: 16 mL, Rfl: 11  •  lisinopril (PRINIVIL,ZESTRIL) 20 MG tablet, Take 1 tablet by mouth once daily, Disp: 90 tablet, Rfl: 0  •  montelukast (Singulair) 4 MG chewable tablet, Chew 1 tablet Every Night., Disp: 30 tablet, Rfl: 2  •  omeprazole (priLOSEC) 20 MG capsule, Take 1 capsule by mouth once daily, Disp: 90 capsule, Rfl: 0  •  Potassium Citrate ER (Urocit-K 15) 15 MEQ (1620 MG) tablet controlled-release, Take 15 mEq by mouth Every 12 (Twelve) Hours., Disp: 180 tablet, Rfl: 1  •  Psyllium (Konsyl-D) 52.3 % powder, Take with breakfast and  "supper, Disp: , Rfl:   •  rosuvastatin (Crestor) 10 MG tablet, Take 1 tablet by mouth Daily., Disp: 90 tablet, Rfl: 1  •  tamsulosin (FLOMAX) 0.4 MG capsule 24 hr capsule, Take 1 capsule by mouth once daily, Disp: 90 capsule, Rfl: 0  •  venlafaxine XR (EFFEXOR-XR) 75 MG 24 hr capsule, TAKE 1 CAPSULE BY MOUTH ONCE DAILY WITH FOOD, Disp: 90 capsule, Rfl: 1  •  Semaglutide-Weight Management (Wegovy) 2.4 MG/0.75ML solution auto-injector, Inject 1 mg under the skin into the appropriate area as directed Every 7 (Seven) Days., Disp: 0.75 mL, Rfl: 3  •  Semaglutide-Weight Management 1 MG/0.5ML solution auto-injector, Inject 0.5 mL under the skin into the appropriate area as directed 1 (One) Time Per Week., Disp: 2 mL, Rfl: 1    VITALS:  /76   Pulse 84   Temp 98.5 °F (36.9 °C)   Ht 175.3 cm (69.02\")   Wt 127 kg (279 lb)   BMI 41.18 kg/m²     Physical Exam  Vitals and nursing note reviewed.   Constitutional:       Appearance: Normal appearance. He is well-developed.   HENT:      Head: Normocephalic.   Eyes:      Extraocular Movements: Extraocular movements intact.      Conjunctiva/sclera: Conjunctivae normal.   Cardiovascular:      Rate and Rhythm: Normal rate and regular rhythm.   Pulmonary:      Effort: Pulmonary effort is normal. No respiratory distress.      Breath sounds: Normal breath sounds.   Abdominal:      General: Bowel sounds are normal.      Palpations: Abdomen is soft.      Tenderness: There is no abdominal tenderness.      Comments: Obese    Skin:     General: Skin is warm and dry.   Neurological:      General: No focal deficit present.      Mental Status: He is alert and oriented to person, place, and time.   Psychiatric:         Mood and Affect: Mood normal.         Behavior: Behavior normal.         LABS:    CMP:  Lab Results   Component Value Date    BUN 10 03/14/2023    CREATININE 0.85 03/14/2023    EGFRIFNONA 90 02/25/2022     03/14/2023    K 4.4 03/14/2023     03/14/2023    CALCIUM " 9.5 03/14/2023    ALBUMIN 4.2 03/14/2023    BILITOT 0.4 03/14/2023    ALKPHOS 67 03/14/2023    AST 49 (H) 03/14/2023    ALT 76 (H) 03/14/2023     CBC:  Lab Results   Component Value Date    WBC 10.57 03/14/2023    RBC 5.30 03/14/2023    HGB 14.1 03/14/2023    HCT 43.5 03/14/2023    MCV 82.1 03/14/2023    MCH 26.6 03/14/2023    MCHC 32.4 03/14/2023    RDW 14.4 03/14/2023     03/14/2023     LIPID PANEL:  Lab Results   Component Value Date    TRIG 100 03/14/2023    HDL 42 03/14/2023    VLDL 19 03/14/2023    LDL 90 03/14/2023    LDLHDL 2.12 03/14/2023     HGBA1C(MOST RECENT):  Lab Results   Component Value Date    HGBA1C 6.10 (H) 03/14/2023     Procedures         ASSESSMENT/PLAN:  Problems Addressed this Visit        Cardiac and Vasculature    Hypertension, benign     Hypertension is unchanged.  Continue current treatment regimen.  Regular aerobic exercise.  Blood pressure will be reassessed at the next regular appointment.            Endocrine and Metabolic    Morbid obesity due to excess calories - Primary     Patient's (Body mass index is 41.18 kg/m².) indicates that they are morbidly/severely obese (BMI > 40 or > 35 with obesity - related health condition) with health conditions that include hypertension, diabetes mellitus, dyslipidemias and osteoarthritis . Weight is improving with treatment. BMI  is above average; BMI management plan is completed. We discussed portion control and increasing exercise.  Increase the wegovy to 1 mg          Relevant Medications    Semaglutide-Weight Management (Wegovy) 2.4 MG/0.75ML solution auto-injector       Other    Type 2 diabetes mellitus with morbid obesity     Diabetes is improving with treatment.   Continue current treatment regimen.  Regular aerobic exercise.  Diabetes will be reassessed in 3 months.         Relevant Medications    dapagliflozin Propanediol (Farxiga) 10 MG tablet   Diagnoses       Codes Comments    Morbid obesity due to excess calories    -  Primary  ICD-10-CM: E66.01  ICD-9-CM: 278.01     Type 2 diabetes mellitus with morbid obesity     ICD-10-CM: E11.69, E66.01  ICD-9-CM: 250.00, 278.01     Hypertension, benign     ICD-10-CM: I10  ICD-9-CM: 401.1           FOLLOW-UP:  Return in about 2 months (around 7/19/2023) for Recheck.      Electronically signed by:    Austyn Carl MD

## 2023-05-20 NOTE — ASSESSMENT & PLAN NOTE
Patient's (Body mass index is 41.18 kg/m².) indicates that they are morbidly/severely obese (BMI > 40 or > 35 with obesity - related health condition) with health conditions that include hypertension, diabetes mellitus, dyslipidemias and osteoarthritis . Weight is improving with treatment. BMI  is above average; BMI management plan is completed. We discussed portion control and increasing exercise.  Increase the wegovy to 1 mg

## 2023-06-12 ENCOUNTER — TELEPHONE (OUTPATIENT)
Dept: INTERNAL MEDICINE | Facility: CLINIC | Age: 48
End: 2023-06-12
Payer: COMMERCIAL

## 2023-06-12 RX ORDER — LISINOPRIL 20 MG/1
TABLET ORAL
Qty: 90 TABLET | Refills: 0 | Status: SHIPPED | OUTPATIENT
Start: 2023-06-12

## 2023-06-12 RX ORDER — OMEPRAZOLE 20 MG/1
20 CAPSULE, DELAYED RELEASE ORAL DAILY
Qty: 90 CAPSULE | Refills: 0 | Status: SHIPPED | OUTPATIENT
Start: 2023-06-12

## 2023-06-12 RX ORDER — VENLAFAXINE HYDROCHLORIDE 75 MG/1
CAPSULE, EXTENDED RELEASE ORAL
Qty: 90 CAPSULE | Refills: 0 | Status: SHIPPED | OUTPATIENT
Start: 2023-06-12

## 2023-06-12 NOTE — TELEPHONE ENCOUNTER
Caller: Sumanth Alfaro    Relationship: Self    Best call back number: [phone]     What is the best time to reach you: ANYTIME    Who are you requesting to speak with (clinical staff, provider,  specific staff member): DR FINLEY'S NURSE    What was the call regarding: SETHJOSEPH IS ON BACK ORDER AND THE PATIENT WOULD LIKE TO KNOW WHAT HIS OPTIONS ARE.    Is it okay if the provider responds through MyChart: WHATEVER IS EASIER FOR HER

## 2023-08-01 ENCOUNTER — OFFICE VISIT (OUTPATIENT)
Dept: INTERNAL MEDICINE | Facility: CLINIC | Age: 48
End: 2023-08-01
Payer: COMMERCIAL

## 2023-08-01 VITALS
WEIGHT: 283 LBS | TEMPERATURE: 97.6 F | BODY MASS INDEX: 41.92 KG/M2 | SYSTOLIC BLOOD PRESSURE: 128 MMHG | HEART RATE: 82 BPM | HEIGHT: 69 IN | DIASTOLIC BLOOD PRESSURE: 88 MMHG

## 2023-08-01 DIAGNOSIS — I10 HYPERTENSION, BENIGN: Primary | ICD-10-CM

## 2023-08-01 DIAGNOSIS — E11.69 TYPE 2 DIABETES MELLITUS WITH MORBID OBESITY: ICD-10-CM

## 2023-08-01 DIAGNOSIS — E66.01 TYPE 2 DIABETES MELLITUS WITH MORBID OBESITY: ICD-10-CM

## 2023-08-01 DIAGNOSIS — E66.01 MORBID OBESITY DUE TO EXCESS CALORIES: ICD-10-CM

## 2023-08-01 RX ORDER — DULAGLUTIDE 1.5 MG/.5ML
1.5 INJECTION, SOLUTION SUBCUTANEOUS WEEKLY
Qty: 0.5 ML | Refills: 3 | Status: SHIPPED | OUTPATIENT
Start: 2023-08-01

## 2023-08-14 NOTE — PROGRESS NOTES
Sleep Clinic Follow Up Note    Chief Complaint  Follow-up    Subjective     History of Present Illness (from previous encounter on 4/27/2021 with Dr. Dsouza):  He denies waking with a dry mouth or sore throat.  He denies ever breaking his nose.  He denies having trouble breathing through his nose although he did have sinus surgery 2 years ago.  He denies falling asleep to sitting quietly during the day.  He does say in the evening he will fall asleep on the couch at times.  He denies getting sleepy driving.  He denies kicking or jerking his legs at night.  He denies having chronic pain that keeps him awake.     He will go to bed about 11 PM.  He falls asleep fairly quickly.  He estimates within 15 to 20 minutes.  He awakens maybe once during the night.  He thinks he gets about 6 hours of sleep but is rested.  He has had hypertension known for 15 years.  He has been told he is prediabetic.  He denies any history of heart disease.  (End copied text)      Interval History:  Sumanth Alfaro is a 48 y.o. male who presents for follow-up patient was last seen by Dr. Dsouza on 4/27/2021 at which time a home sleep test was ordered.  This has not yet been obtained. He sleeps about 6 hours per night and wakes 1-2 times. It takes about 10 minutes to get to sleep. He continues to snore and has had episodes of pauses/apnea.        Further details are as follows:    Laurel Scale is: 10/24      Weight:    Weight change in the last year:  loss: 15 lbs    The patient's relevant past medical, surgical, family, and social history reviewed and updated in Epic as appropriate.    PMH:    Past Medical History:   Diagnosis Date    Abdominal pain     Acute cholecystitis     Carpal tunnel syndrome     1/2020 had staph infection of right wrist after surgery    GERD (gastroesophageal reflux disease)     Headache     Dr Clement for headaches-referred to ENT    Hx of colonic polyp 9/9/2022    Per Dr walsh - use konsyl     Hypertension      Kidney stone     per Navas    Left shoulder pain     Nephrolithiasis     Sphincter of Oddi dysfunction     Type 2 diabetes mellitus with morbid obesity 2/24/2021 2/2021    Vitamin D deficiency 2/24/2021     Past Surgical History:   Procedure Laterality Date    CARPAL TUNNEL RELEASE Bilateral 2003    CARPAL TUNNEL RELEASE Right 01/2020    CARPAL TUNNEL RELEASE Left 11/2019    CHOLECYSTECTOMY  2010    laparoscopic-per DR Adams     COLONOSCOPY  11/2009    mild ulcer DR Hale    SHOULDER ARTHROSCOPY  2009    DR Montana@    SINUS SURGERY  2018    Dr Salmon did sinus procedure cleaning out sinuses       Allergies   Allergen Reactions    Sulfa Antibiotics Rash       MEDS:  Prior to Admission medications    Medication Sig Start Date End Date Taking? Authorizing Provider   Cholecalciferol (Vitamin D3) 50 MCG (2000 UT) capsule Take 1 capsule by mouth Daily. 2/24/21   Austyn Carl MD   Cyanocobalamin (B-12) 1000 MCG tablet Take  by mouth. Once daily    Provider, MD Gamal   dapagliflozin Propanediol (Farxiga) 10 MG tablet Take 10 mg by mouth Daily. 5/19/23   Austyn Carl MD   Dulaglutide (Trulicity) 1.5 MG/0.5ML solution pen-injector Inject 1.5 mg under the skin into the appropriate area as directed 1 (One) Time Per Week. 8/1/23   Austyn Carl MD   fluticasone (Flonase) 50 MCG/ACT nasal spray 2 sprays into the nostril(s) as directed by provider Daily. 9/9/22   Austyn Carl MD   lisinopril (PRINIVIL,ZESTRIL) 20 MG tablet Take 1 tablet by mouth once daily 6/12/23   Austyn Carl MD   montelukast (Singulair) 4 MG chewable tablet Chew 1 tablet Every Night. 12/16/22   Angélica Andrew PA-C   omeprazole (priLOSEC) 20 MG capsule Take 1 capsule by mouth once daily 6/12/23   Tamar Wellington MD   Potassium Citrate ER (Urocit-K 15) 15 MEQ (1620 MG) tablet controlled-release Take 15 mEq by mouth Every 12 (Twelve) Hours. 2/25/22   Austyn Carl MD   Psyllium (Konsyl-D) 52.3 % powder Take  "with breakfast and supper 9/9/22   Austyn Carl MD   rosuvastatin (Crestor) 10 MG tablet Take 1 tablet by mouth Daily. 9/9/22   Austyn Carl MD   tamsulosin (FLOMAX) 0.4 MG capsule 24 hr capsule Take 1 capsule by mouth once daily 3/3/23   Austyn Carl MD   venlafaxine XR (EFFEXOR-XR) 75 MG 24 hr capsule TAKE 1 CAPSULE BY MOUTH ONCE DAILY WITH FOOD 6/12/23   Austyn Carl MD         FH:  Family History   Problem Relation Age of Onset    Cancer Mother     Heart disease Father     Obesity Father     Coronary artery disease Father     Hypertension Father     Diabetes Father     Hyperlipidemia Father     Cancer Other         barthrolin gland    Obesity Other        Objective   Vital Signs:  /68 (BP Location: Left arm, Patient Position: Sitting)   Pulse 89   Ht 175.3 cm (69\")   Wt 130 kg (286 lb 9.6 oz)   SpO2 95%   BMI 42.32 kg/mý            Patient's (Body mass index is 42.32 kg/mý.) indicates that they are morbidly obese (BMI > 40 or > 35 with obesity - related health condition) with health related conditions that include obstructive sleep apnea . Weight is improving with lifestyle modifications. BMI is is above average; BMI management plan is completed. We discussed portion control and increasing exercise.  He has been losing weight with wegovy        Physical Exam  Vitals reviewed.   Constitutional:       Appearance: Normal appearance.   HENT:      Head: Normocephalic and atraumatic.      Nose: Nose normal.      Mouth/Throat:      Mouth: Mucous membranes are moist.   Cardiovascular:      Rate and Rhythm: Normal rate and regular rhythm.      Heart sounds: No murmur heard.    No friction rub. No gallop.   Pulmonary:      Effort: Pulmonary effort is normal. No respiratory distress.      Breath sounds: Normal breath sounds. No wheezing or rhonchi.   Neurological:      Mental Status: He is alert and oriented to person, place, and time.   Psychiatric:         Behavior: Behavior normal. "       Mallampati Score: IV (only hard palate visible)    Result Review :              Assessment and Plan  Sumanth Alfaro is a 48 y.o. male returns for follow-up and further evaluation of excessive daytime sleepiness and fatigue, nonrestorative sleep, and concerns for sleep disordered breathing and obstructive sleep apnea.  The patient continues to have difficulty with snoring and has had witnessed episodes of pauses in breathing as well as apneas.  A sleep study had been ordered previously but he was unable to obtain this at the time.  I will reorder a home sleep test today after which the patient will return for follow-up and recommendations.  I discussed weight loss as a pertains to obstructive sleep apnea.    Diagnoses and all orders for this visit:    1. Snoring (Primary)  -     Home Sleep Study; Future    2. Suspected sleep apnea  -     Home Sleep Study; Future    3. Excessive daytime sleepiness  -     Home Sleep Study; Future    4. Class 3 severe obesity with body mass index (BMI) of 40.0 to 44.9 in adult, unspecified obesity type, unspecified whether serious comorbidity present                  Follow Up  Return for Follow up after study.  Patient was given instructions and counseling regarding his condition or for health maintenance advice. Please see specific information pulled into the AVS if appropriate.       CARMEL Palafox, [facility]-BC  Pulmonology, Critical Care, and Sleep Medicine

## 2023-08-15 ENCOUNTER — OFFICE VISIT (OUTPATIENT)
Dept: SLEEP MEDICINE | Facility: HOSPITAL | Age: 48
End: 2023-08-15
Payer: COMMERCIAL

## 2023-08-15 VITALS
HEART RATE: 89 BPM | OXYGEN SATURATION: 95 % | HEIGHT: 69 IN | WEIGHT: 286.6 LBS | BODY MASS INDEX: 42.45 KG/M2 | SYSTOLIC BLOOD PRESSURE: 131 MMHG | DIASTOLIC BLOOD PRESSURE: 68 MMHG

## 2023-08-15 DIAGNOSIS — R06.83 SNORING: Primary | ICD-10-CM

## 2023-08-15 DIAGNOSIS — G47.19 EXCESSIVE DAYTIME SLEEPINESS: ICD-10-CM

## 2023-08-15 DIAGNOSIS — R29.818 SUSPECTED SLEEP APNEA: ICD-10-CM

## 2023-08-15 DIAGNOSIS — E66.01 CLASS 3 SEVERE OBESITY WITH BODY MASS INDEX (BMI) OF 40.0 TO 44.9 IN ADULT, UNSPECIFIED OBESITY TYPE, UNSPECIFIED WHETHER SERIOUS COMORBIDITY PRESENT: ICD-10-CM

## 2023-08-15 NOTE — PATIENT INSTRUCTIONS
Screening for Sleep Apnea    Sleep apnea is a condition in which breathing pauses or becomes shallow during sleep. Sleep apnea screening is a test to determine if you are at risk for sleep apnea. The test is easy and only takes a few minutes. Your health care provider may ask you to have this test in preparation for surgery or as part of a physical exam.  What are the symptoms of sleep apnea?  Common symptoms of sleep apnea include:  Snoring.  Restless sleep.  Daytime sleepiness.  Pauses in breathing.  Choking during sleep.  Irritability.  Forgetfulness.  Trouble thinking clearly.  Depression.  Personality changes.  Most people with sleep apnea are not aware that they have it.  Why should I get screened?  Getting screened for sleep apnea can help:  Ensure your safety. It is important for your health care providers to know whether or not you have sleep apnea, especially if you are having surgery or have other long-term (chronic) health conditions.  Improve your health and allow you to get a better night's rest. Restful sleep can help you:  Have more energy.  Lose weight.  Improve high blood pressure.  Improve diabetes management.  Prevent stroke.  Prevent car accidents.  How is screening done?  Screening usually includes being asked a list of questions about your sleep quality. Some questions you may be asked include:  Do you snore?  Is your sleep restless?  Do you have daytime sleepiness?  Has a partner or spouse told you that you stop breathing during sleep?  Have you had trouble concentrating or memory loss?  If your screening test is positive, you are at risk for the condition. Further testing may be needed to confirm a diagnosis of sleep apnea.  Where to find more information  You can find screening tools online or at your health care clinic. For more information about sleep apnea screening and healthy sleep, visit these websites:  Centers for Disease Control and Prevention:  www.cdc.gov/sleep/index.html  American Sleep Apnea Association: www.sleepapnea.org  Contact a health care provider if:  You think that you may have sleep apnea.  Summary  Sleep apnea screening can help determine if you are at risk for sleep apnea.  It is important for your health care providers to know whether or not you have sleep apnea, especially if you are having surgery or have other chronic health conditions.  You may be asked to take a screening test for sleep apnea in preparation for surgery or as part of a physical exam.  This information is not intended to replace advice given to you by your health care provider. Make sure you discuss any questions you have with your health care provider.  Document Revised: 10/04/2019 Document Reviewed: 03/30/2018  Elsevier Patient Education c 2021 [employer]

## 2023-09-07 ENCOUNTER — HOSPITAL ENCOUNTER (OUTPATIENT)
Dept: SLEEP MEDICINE | Facility: HOSPITAL | Age: 48
End: 2023-09-07
Payer: COMMERCIAL

## 2023-09-07 VITALS — BODY MASS INDEX: 42.36 KG/M2 | WEIGHT: 286 LBS | HEIGHT: 69 IN

## 2023-09-07 DIAGNOSIS — R29.818 SUSPECTED SLEEP APNEA: ICD-10-CM

## 2023-09-07 DIAGNOSIS — G47.19 EXCESSIVE DAYTIME SLEEPINESS: ICD-10-CM

## 2023-09-07 DIAGNOSIS — R06.83 SNORING: ICD-10-CM

## 2023-09-07 PROCEDURE — 95800 SLP STDY UNATTENDED: CPT

## 2023-09-11 DIAGNOSIS — G47.33 OSA (OBSTRUCTIVE SLEEP APNEA): Primary | ICD-10-CM

## 2023-09-11 NOTE — PROGRESS NOTES
ADVISED PATIENT OF STUDY RESULTS VIA "[employer]HART. AWAITING RESPONSE ON DECISION FOR YANET TREATMENT.

## 2023-09-14 ENCOUNTER — TELEPHONE (OUTPATIENT)
Dept: SLEEP MEDICINE | Facility: HOSPITAL | Age: 48
End: 2023-09-14
Payer: COMMERCIAL

## 2023-09-18 NOTE — PROGRESS NOTES
Unable to contact after 3 attempts. If patient returns call, please schedule a follow up to discuss sleep study.

## 2023-09-29 DIAGNOSIS — E66.01 TYPE 2 DIABETES MELLITUS WITH MORBID OBESITY: ICD-10-CM

## 2023-09-29 DIAGNOSIS — E11.69 TYPE 2 DIABETES MELLITUS WITH MORBID OBESITY: ICD-10-CM

## 2023-09-29 RX ORDER — OMEPRAZOLE 20 MG/1
20 CAPSULE, DELAYED RELEASE ORAL DAILY
Qty: 90 CAPSULE | Refills: 0 | Status: SHIPPED | OUTPATIENT
Start: 2023-09-29

## 2023-09-29 RX ORDER — LISINOPRIL 20 MG/1
TABLET ORAL
Qty: 90 TABLET | Refills: 1 | Status: SHIPPED | OUTPATIENT
Start: 2023-09-29

## 2023-09-29 RX ORDER — VENLAFAXINE HYDROCHLORIDE 75 MG/1
CAPSULE, EXTENDED RELEASE ORAL
Qty: 90 CAPSULE | Refills: 1 | Status: SHIPPED | OUTPATIENT
Start: 2023-09-29

## 2023-09-29 RX ORDER — DAPAGLIFLOZIN 10 MG/1
TABLET, FILM COATED ORAL
Qty: 90 TABLET | Refills: 1 | Status: SHIPPED | OUTPATIENT
Start: 2023-09-29

## 2023-10-11 ENCOUNTER — LAB (OUTPATIENT)
Dept: LAB | Facility: HOSPITAL | Age: 48
End: 2023-10-11
Payer: COMMERCIAL

## 2023-10-11 ENCOUNTER — OFFICE VISIT (OUTPATIENT)
Dept: INTERNAL MEDICINE | Facility: CLINIC | Age: 48
End: 2023-10-11
Payer: COMMERCIAL

## 2023-10-11 VITALS
DIASTOLIC BLOOD PRESSURE: 86 MMHG | BODY MASS INDEX: 41.77 KG/M2 | WEIGHT: 282 LBS | SYSTOLIC BLOOD PRESSURE: 108 MMHG | TEMPERATURE: 97.6 F | HEIGHT: 69 IN | HEART RATE: 80 BPM

## 2023-10-11 DIAGNOSIS — E11.69 TYPE 2 DIABETES MELLITUS WITH MORBID OBESITY: ICD-10-CM

## 2023-10-11 DIAGNOSIS — I10 HYPERTENSION, BENIGN: ICD-10-CM

## 2023-10-11 DIAGNOSIS — E66.01 MORBID OBESITY DUE TO EXCESS CALORIES: Primary | ICD-10-CM

## 2023-10-11 DIAGNOSIS — M25.522 LEFT ELBOW PAIN: ICD-10-CM

## 2023-10-11 DIAGNOSIS — E78.5 HYPERLIPIDEMIA LDL GOAL <70: ICD-10-CM

## 2023-10-11 DIAGNOSIS — Z23 NEED FOR VACCINATION: ICD-10-CM

## 2023-10-11 DIAGNOSIS — E66.01 TYPE 2 DIABETES MELLITUS WITH MORBID OBESITY: ICD-10-CM

## 2023-10-11 LAB
ALBUMIN SERPL-MCNC: 4.2 G/DL (ref 3.5–5.2)
ALBUMIN/GLOB SERPL: 1.3 G/DL
ALP SERPL-CCNC: 76 U/L (ref 39–117)
ALT SERPL W P-5'-P-CCNC: 35 U/L (ref 1–41)
ANION GAP SERPL CALCULATED.3IONS-SCNC: 9.7 MMOL/L (ref 5–15)
AST SERPL-CCNC: 27 U/L (ref 1–40)
BILIRUB SERPL-MCNC: 0.4 MG/DL (ref 0–1.2)
BUN SERPL-MCNC: 10 MG/DL (ref 6–20)
BUN/CREAT SERPL: 9.8 (ref 7–25)
CALCIUM SPEC-SCNC: 9.8 MG/DL (ref 8.6–10.5)
CHLORIDE SERPL-SCNC: 102 MMOL/L (ref 98–107)
CO2 SERPL-SCNC: 27.3 MMOL/L (ref 22–29)
CREAT SERPL-MCNC: 1.02 MG/DL (ref 0.76–1.27)
EGFRCR SERPLBLD CKD-EPI 2021: 89.5 ML/MIN/1.73
GLOBULIN UR ELPH-MCNC: 3.3 GM/DL
GLUCOSE SERPL-MCNC: 79 MG/DL (ref 65–99)
HBA1C MFR BLD: 5.7 % (ref 4.8–5.6)
POTASSIUM SERPL-SCNC: 4.5 MMOL/L (ref 3.5–5.2)
PROT SERPL-MCNC: 7.5 G/DL (ref 6–8.5)
SODIUM SERPL-SCNC: 139 MMOL/L (ref 136–145)

## 2023-10-11 PROCEDURE — 83036 HEMOGLOBIN GLYCOSYLATED A1C: CPT

## 2023-10-11 PROCEDURE — 80053 COMPREHEN METABOLIC PANEL: CPT

## 2023-10-11 RX ORDER — DULAGLUTIDE 3 MG/.5ML
3 INJECTION, SOLUTION SUBCUTANEOUS WEEKLY
Qty: 0.5 ML | Refills: 5 | Status: SHIPPED | OUTPATIENT
Start: 2023-10-11

## 2023-10-11 NOTE — PROGRESS NOTES
Chief Complaint   Patient presents with    Hypertension    Elbow Pain     left       History of Present Illness  48 y.o. male presents for follow up of weight and blood pressure     Review of Systems   Constitutional:  Negative for chills and fever.   Respiratory:  Negative for cough and shortness of breath.    Cardiovascular:  Negative for chest pain and palpitations.   Gastrointestinal:  Negative for abdominal pain, nausea and vomiting.   Musculoskeletal:  Positive for arthralgias.   Skin:  Negative for rash.   Neurological:  Negative for dizziness, light-headedness and headaches.   All other systems reviewed and are negative.    .    PMSFH:  The following portions of the patient's history were reviewed and updated as appropriate: allergies, current medications, past family history, past medical history, past social history, past surgical history and problem list.      Current Outpatient Medications:     Cholecalciferol (Vitamin D3) 50 MCG (2000 UT) capsule, Take 1 capsule by mouth Daily., Disp: 30 capsule, Rfl: 11    Cyanocobalamin (B-12) 1000 MCG tablet, Take  by mouth. Once daily, Disp: , Rfl:     dapagliflozin Propanediol (Farxiga) 10 MG tablet, Take 1 tablet by mouth once daily, Disp: 90 tablet, Rfl: 1    fluticasone (Flonase) 50 MCG/ACT nasal spray, 2 sprays into the nostril(s) as directed by provider Daily., Disp: 16 mL, Rfl: 11    lisinopril (PRINIVIL,ZESTRIL) 20 MG tablet, Take 1 tablet by mouth once daily, Disp: 90 tablet, Rfl: 1    montelukast (Singulair) 4 MG chewable tablet, Chew 1 tablet Every Night., Disp: 30 tablet, Rfl: 2    omeprazole (priLOSEC) 20 MG capsule, Take 1 capsule by mouth once daily, Disp: 90 capsule, Rfl: 0    Potassium Citrate ER (Urocit-K 15) 15 MEQ (1620 MG) tablet controlled-release, Take 15 mEq by mouth Every 12 (Twelve) Hours., Disp: 180 tablet, Rfl: 1    Psyllium (Konsyl-D) 52.3 % powder, Take with breakfast and supper, Disp: , Rfl:     rosuvastatin (Crestor) 10 MG tablet,  "Take 1 tablet by mouth Daily., Disp: 90 tablet, Rfl: 1    tamsulosin (FLOMAX) 0.4 MG capsule 24 hr capsule, Take 1 capsule by mouth once daily, Disp: 90 capsule, Rfl: 0    venlafaxine XR (EFFEXOR-XR) 75 MG 24 hr capsule, TAKE 1 CAPSULE BY MOUTH ONCE DAILY WITH FOOD, Disp: 90 capsule, Rfl: 1    Dulaglutide (Trulicity) 3 MG/0.5ML solution pen-injector, Inject 0.5 mL under the skin into the appropriate area as directed 1 (One) Time Per Week., Disp: 0.5 mL, Rfl: 5    VITALS:  /86   Pulse 80   Temp 97.6 øF (36.4 øC)   Ht 175.3 cm (69.02\")   Wt 128 kg (282 lb)   BMI 41.62 kg/mý     Physical Exam  Constitutional:       Appearance: Normal appearance.   Cardiovascular:      Rate and Rhythm: Normal rate and regular rhythm.   Pulmonary:      Effort: Pulmonary effort is normal. No respiratory distress.   Abdominal:      General: Bowel sounds are normal.      Palpations: Abdomen is soft.      Tenderness: There is no abdominal tenderness.      Comments: Obese    Musculoskeletal:         General: Tenderness (mild lateral left elbow tender) present.   Neurological:      General: No focal deficit present.      Mental Status: He is alert and oriented to person, place, and time.   Psychiatric:         Mood and Affect: Mood normal.         Behavior: Behavior normal.         LABS:    CMP:  Lab Results   Component Value Date    BUN 10 03/14/2023    CREATININE 0.85 03/14/2023    EGFRIFNONA 90 02/25/2022     03/14/2023    K 4.4 03/14/2023     03/14/2023    CALCIUM 9.5 03/14/2023    ALBUMIN 4.2 03/14/2023    BILITOT 0.4 03/14/2023    ALKPHOS 67 03/14/2023    AST 49 (H) 03/14/2023    ALT 76 (H) 03/14/2023     CBC:  Lab Results   Component Value Date    WBC 10.57 03/14/2023    RBC 5.30 03/14/2023    HGB 14.1 03/14/2023    HCT 43.5 03/14/2023    MCV 82.1 03/14/2023    MCH 26.6 03/14/2023    MCHC 32.4 03/14/2023    RDW 14.4 03/14/2023     03/14/2023     LIPID PANEL:  Lab Results   Component Value Date    TRIG 100 " 03/14/2023    HDL 42 03/14/2023    VLDL 19 03/14/2023    LDL 90 03/14/2023    LDLHDL 2.12 03/14/2023     HGBA1C(LAST 2):  Lab Results   Component Value Date    HGBA1C 6.10 (H) 03/14/2023    HGBA1C 6.3 12/16/2022     Procedures         ASSESSMENT/PLAN:  Diagnoses and all orders for this visit:    1. Morbid obesity due to excess calories (Primary)  Assessment & Plan:  Patient's (Body mass index is 41.62 kg/mý.) indicates that they are morbidly/severely obese (BMI > 40 or > 35 with obesity - related health condition) with health conditions that include hypertension, diabetes mellitus, dyslipidemias, and osteoarthritis . Weight is unchanged. BMI  is above average; BMI management plan is completed. We discussed portion control and increasing exercise.       2. Type 2 diabetes mellitus with morbid obesity  Assessment & Plan:  Patient's (Body mass index is 41.62 kg/mý.) indicates that they are morbidly/severely obese (BMI > 40 or > 35 with obesity - related health condition) with health conditions that include hypertension, diabetes mellitus, dyslipidemias, and osteoarthritis . Weight is unchanged. BMI  is above average; BMI management plan is completed. We discussed portion control and increasing exercise.     Orders:  -     Dulaglutide (Trulicity) 3 MG/0.5ML solution pen-injector; Inject 0.5 mL under the skin into the appropriate area as directed 1 (One) Time Per Week.  Dispense: 0.5 mL; Refill: 5  -     Hemoglobin A1c; Future    3. Hypertension, benign  Assessment & Plan:  Hypertension is unchanged.  Continue current treatment regimen.  Dietary sodium restriction.  Weight loss.  Regular aerobic exercise.  Blood pressure will be reassessed at the next regular appointment.    Orders:  -     Comprehensive Metabolic Panel; Future    4. Hyperlipidemia LDL goal <70  Assessment & Plan:  Lipid abnormalities are unchanged.  Nutritional counseling was provided.  Lipids will be reassessed in 6 months.      5. Left elbow  pain  Comments:  Mild pain and do exercises and if persist go to ORth[facility] and or PT    6. Need for vaccination  -     Fluzone (or Fluarix & Flulaval for VFC) >6mos        FOLLOW-UP:  Return in about 3 months (around 1/11/2024).      Electronically signed by:    Austyn Carl MD

## 2023-10-12 NOTE — ASSESSMENT & PLAN NOTE
Patient's (Body mass index is 41.62 kg/mý.) indicates that they are morbidly/severely obese (BMI > 40 or > 35 with obesity - related health condition) with health conditions that include hypertension, diabetes mellitus, dyslipidemias, and osteoarthritis . Weight is unchanged. BMI  is above average; BMI management plan is completed. We discussed portion control and increasing exercise.

## 2023-10-23 RX ORDER — FLUTICASONE PROPIONATE 50 MCG
SPRAY, SUSPENSION (ML) NASAL
Qty: 16 G | Refills: 0 | Status: SHIPPED | OUTPATIENT
Start: 2023-10-23

## 2023-10-23 NOTE — TELEPHONE ENCOUNTER
Rx Refill Note  Requested Prescriptions     Pending Prescriptions Disp Refills    fluticasone (FLONASE) 50 MCG/ACT nasal spray [Pharmacy Med Name: Fluticasone Propionate 50 MCG/ACT Nasal Suspension] 16 g 0     Sig: USE 2 SPRAY(S) IN EACH NOSTRIL ONCE DAILY AS DIRECTED BY PROVIDER      Last office visit with prescribing clinician: 10/11/2023   Last telemedicine visit with prescribing clinician: Visit date not found   Next office visit with prescribing clinician: 1/18/2024                         Would you like a call back once the refill request has been completed: [] Yes [] No    If the office needs to give you a call back, can they leave a voicemail: [] Yes [] No    Guerline Monae LPN  10/23/23, 14:18 EDT

## 2023-12-22 RX ORDER — OMEPRAZOLE 20 MG/1
20 CAPSULE, DELAYED RELEASE ORAL DAILY
Qty: 90 CAPSULE | Refills: 0 | Status: SHIPPED | OUTPATIENT
Start: 2023-12-22

## 2024-01-02 RX ORDER — FLUTICASONE PROPIONATE 50 MCG
SPRAY, SUSPENSION (ML) NASAL
Qty: 16 G | Refills: 0 | Status: SHIPPED | OUTPATIENT
Start: 2024-01-02

## 2024-01-02 NOTE — TELEPHONE ENCOUNTER
Rx Refill Note  Requested Prescriptions     Pending Prescriptions Disp Refills    fluticasone (FLONASE) 50 MCG/ACT nasal spray [Pharmacy Med Name: Fluticasone Propionate 50 MCG/ACT Nasal Suspension] 16 g 0     Sig: USE 2 SPRAY(S) IN EACH NOSTRIL ONCE DAILY AS DIRECTED BY PROVIDER      Last office visit with prescribing clinician: 10/11/2023   Last telemedicine visit with prescribing clinician: Visit date not found   Next office visit with prescribing clinician: 1/18/2024                         Would you like a call back once the refill request has been completed: [] Yes [] No    If the office needs to give you a call back, can they leave a voicemail: [] Yes [] No    Sue Villasenor LPN  01/02/24, 15:55 EST

## 2024-01-15 ENCOUNTER — TELEPHONE (OUTPATIENT)
Dept: INTERNAL MEDICINE | Facility: CLINIC | Age: 49
End: 2024-01-15

## 2024-01-15 DIAGNOSIS — J34.89 NASAL MASS: ICD-10-CM

## 2024-01-15 DIAGNOSIS — R09.81 NASAL CONGESTION: Primary | ICD-10-CM

## 2024-01-18 ENCOUNTER — HOSPITAL ENCOUNTER (OUTPATIENT)
Dept: GENERAL RADIOLOGY | Facility: HOSPITAL | Age: 49
Discharge: HOME | End: 2024-01-18
Admitting: INTERNAL MEDICINE
Payer: COMMERCIAL

## 2024-01-18 ENCOUNTER — OFFICE VISIT (OUTPATIENT)
Dept: INTERNAL MEDICINE | Facility: CLINIC | Age: 49
End: 2024-01-18
Payer: COMMERCIAL

## 2024-01-18 VITALS
BODY MASS INDEX: 41.92 KG/M2 | HEIGHT: 69 IN | SYSTOLIC BLOOD PRESSURE: 126 MMHG | DIASTOLIC BLOOD PRESSURE: 80 MMHG | WEIGHT: 283 LBS | TEMPERATURE: 97.8 F | HEART RATE: 92 BPM

## 2024-01-18 DIAGNOSIS — E11.69 TYPE 2 DIABETES MELLITUS WITH MORBID OBESITY: Primary | ICD-10-CM

## 2024-01-18 DIAGNOSIS — E66.01 TYPE 2 DIABETES MELLITUS WITH MORBID OBESITY: Primary | ICD-10-CM

## 2024-01-18 DIAGNOSIS — E66.01 MORBID OBESITY DUE TO EXCESS CALORIES: ICD-10-CM

## 2024-01-18 DIAGNOSIS — Z20.828 EXPOSURE TO THE FLU: ICD-10-CM

## 2024-01-18 DIAGNOSIS — J34.89 MAXILLARY SINUS MASS: ICD-10-CM

## 2024-01-18 DIAGNOSIS — M54.50 ACUTE RIGHT-SIDED LOW BACK PAIN WITHOUT SCIATICA: ICD-10-CM

## 2024-01-18 PROCEDURE — 72100 X-RAY EXAM L-S SPINE 2/3 VWS: CPT

## 2024-01-18 RX ORDER — OSELTAMIVIR PHOSPHATE 75 MG/1
75 CAPSULE ORAL DAILY
Qty: 10 CAPSULE | Refills: 0 | Status: SHIPPED | OUTPATIENT
Start: 2024-01-18 | End: 2024-01-28

## 2024-01-18 RX ORDER — SEMAGLUTIDE 0.25 MG/.5ML
0.25 INJECTION, SOLUTION SUBCUTANEOUS WEEKLY
Qty: 0.5 ML | Refills: 1 | Status: SHIPPED | OUTPATIENT
Start: 2024-01-18

## 2024-01-18 NOTE — ASSESSMENT & PLAN NOTE
Patient's (Body mass index is 41.77 kg/m².) indicates that they are morbidly/severely obese (BMI > 40 or > 35 with obesity - related health condition) with health conditions that include hypertension, diabetes mellitus, dyslipidemias, GERD, and osteoarthritis . Weight is unchanged. BMI  is above average; BMI management plan is completed. We discussed portion control and increasing exercise. He is not making progress with trulicity. Will try resuming wegovy and put on trulicity

## 2024-01-18 NOTE — PROGRESS NOTES
Chief Complaint   Patient presents with    Sinus Problem    Back Pain     Counseling was given to patient for the following topics: diagnostic results, instructions for management, risk factor reductions, prognosis, patient and family education, impressions, risks and benefits of treatment options, and importance of treatment compliance . Total time of the encounter was 42 minutes.  History of Present Illness  48 y.o. male presents for follow up of weight. He is not losing weight on trulicity but sugars are stable. He would like to take wegovy. Of note when getting films for his up coming implant on left side he was found to have a sinus mass. He has had some right low back pain for  about 2 months. The pain goes mildly into right buttocks but not down either leg.     Review of Systems   Constitutional:  Positive for fatigue.   HENT:  Positive for sinus pressure. Negative for sore throat.    Respiratory:  Negative for cough and shortness of breath.    Cardiovascular:  Negative for chest pain.   Gastrointestinal:  Negative for abdominal pain.   Musculoskeletal:  Positive for back pain.   Neurological:  Negative for numbness.     .    PMSFH:  The following portions of the patient's history were reviewed and updated as appropriate: allergies, current medications, past family history, past medical history, past social history, past surgical history and problem list.He has a child with the flu living with him and concerned he will get the flu.        Current Outpatient Medications:     Cholecalciferol (Vitamin D3) 50 MCG (2000 UT) capsule, Take 1 capsule by mouth Daily., Disp: 30 capsule, Rfl: 11    Cyanocobalamin (B-12) 1000 MCG tablet, Take  by mouth. Once daily, Disp: , Rfl:     dapagliflozin Propanediol (Farxiga) 10 MG tablet, Take 1 tablet by mouth once daily, Disp: 90 tablet, Rfl: 1    fluticasone (FLONASE) 50 MCG/ACT nasal spray, USE 2 SPRAY(S) IN EACH NOSTRIL ONCE DAILY AS DIRECTED BY PROVIDER, Disp: 16 g, Rfl:  "0    lisinopril (PRINIVIL,ZESTRIL) 20 MG tablet, Take 1 tablet by mouth once daily, Disp: 90 tablet, Rfl: 1    montelukast (Singulair) 4 MG chewable tablet, Chew 1 tablet Every Night., Disp: 30 tablet, Rfl: 2    omeprazole (priLOSEC) 20 MG capsule, Take 1 capsule by mouth once daily, Disp: 90 capsule, Rfl: 0    Potassium Citrate ER (Urocit-K 15) 15 MEQ (1620 MG) tablet controlled-release, Take 15 mEq by mouth Every 12 (Twelve) Hours., Disp: 180 tablet, Rfl: 1    Psyllium (Konsyl-D) 52.3 % powder, Take with breakfast and supper, Disp: , Rfl:     rosuvastatin (Crestor) 10 MG tablet, Take 1 tablet by mouth Daily., Disp: 90 tablet, Rfl: 1    tamsulosin (FLOMAX) 0.4 MG capsule 24 hr capsule, Take 1 capsule by mouth once daily, Disp: 90 capsule, Rfl: 0    venlafaxine XR (EFFEXOR-XR) 75 MG 24 hr capsule, TAKE 1 CAPSULE BY MOUTH ONCE DAILY WITH FOOD, Disp: 90 capsule, Rfl: 1    oseltamivir (Tamiflu) 75 MG capsule, Take 1 capsule by mouth Daily for 10 days., Disp: 10 capsule, Rfl: 0    Semaglutide-Weight Management (Wegovy) 0.25 MG/0.5ML solution auto-injector, Inject 0.25 mg under the skin into the appropriate area as directed 1 (One) Time Per Week., Disp: 0.5 mL, Rfl: 1    VITALS:  /80   Pulse 92   Temp 97.8 °F (36.6 °C)   Ht 175.3 cm (69.02\")   Wt 128 kg (283 lb)   BMI 41.77 kg/m²     Physical Exam  Constitutional:       Appearance: Normal appearance.   HENT:      Right Ear: Tympanic membrane and ear canal normal.      Left Ear: Tympanic membrane and ear canal normal.      Mouth/Throat:      Pharynx: No oropharyngeal exudate.   Eyes:      Conjunctiva/sclera: Conjunctivae normal.   Neck:      Vascular: No carotid bruit.   Cardiovascular:      Rate and Rhythm: Normal rate and regular rhythm.   Pulmonary:      Effort: Pulmonary effort is normal.      Breath sounds: No wheezing or rales.   Abdominal:      General: Bowel sounds are normal.      Palpations: Abdomen is soft.      Comments: Obese    Neurological:      " General: No focal deficit present.      Mental Status: He is alert and oriented to person, place, and time.   Psychiatric:         Mood and Affect: Mood normal.         Behavior: Behavior normal.         LABS:    CMP:  Lab Results   Component Value Date    BUN 10 10/11/2023    CREATININE 1.02 10/11/2023    EGFRIFNONA 90 02/25/2022     10/11/2023    K 4.5 10/11/2023     10/11/2023    CALCIUM 9.8 10/11/2023    ALBUMIN 4.2 10/11/2023    BILITOT 0.4 10/11/2023    ALKPHOS 76 10/11/2023    AST 27 10/11/2023    ALT 35 10/11/2023     CBC:  Lab Results   Component Value Date    WBC 10.57 03/14/2023    RBC 5.30 03/14/2023    HGB 14.1 03/14/2023    HCT 43.5 03/14/2023    MCV 82.1 03/14/2023    MCH 26.6 03/14/2023    MCHC 32.4 03/14/2023    RDW 14.4 03/14/2023     03/14/2023     LIPID PANEL:  Lab Results   Component Value Date    TRIG 100 03/14/2023    HDL 42 03/14/2023    VLDL 19 03/14/2023    LDL 90 03/14/2023    LDLHDL 2.12 03/14/2023     TSH:  Lab Results   Component Value Date    TSH 1.660 03/14/2023     HGBA1C (LAST 3):  Lab Results   Component Value Date    HGBA1C 5.70 (H) 10/11/2023    HGBA1C 6.10 (H) 03/14/2023    HGBA1C 6.3 12/16/2022     Procedures         ASSESSMENT/PLAN:  Diagnoses and all orders for this visit:    1. Type 2 diabetes mellitus with morbid obesity (Primary)  Assessment & Plan:  Patient's (Body mass index is 41.77 kg/m².) indicates that they are morbidly/severely obese (BMI > 40 or > 35 with obesity - related health condition) with health conditions that include hypertension, diabetes mellitus, dyslipidemias, GERD, and osteoarthritis . Weight is unchanged. BMI  is above average; BMI management plan is completed. We discussed portion control and increasing exercise.       2. Morbid obesity due to excess calories  Assessment & Plan:  Patient's (Body mass index is 41.77 kg/m².) indicates that they are morbidly/severely obese (BMI > 40 or > 35 with obesity - related health condition)  with health conditions that include hypertension, diabetes mellitus, dyslipidemias, GERD, and osteoarthritis . Weight is unchanged. BMI  is above average; BMI management plan is completed. We discussed portion control and increasing exercise. He is not making progress with trulicity. Will try resuming wegovy and put on trulicity     Orders:  -     Semaglutide-Weight Management (Wegovy) 0.25 MG/0.5ML solution auto-injector; Inject 0.25 mg under the skin into the appropriate area as directed 1 (One) Time Per Week.  Dispense: 0.5 mL; Refill: 1    3. Exposure to the flu  Comments:  He is without symptoms. Take tamiflu daily for 10 days unless gets symptoms and then convert to twice a day.  Orders:  -     oseltamivir (Tamiflu) 75 MG capsule; Take 1 capsule by mouth Daily for 10 days.  Dispense: 10 capsule; Refill: 0    4. Acute right-sided low back pain without sciatica  Assessment & Plan:  Acute issue of 2 months of low back pain. Proceed with xray     Orders:  -     XR Spine Lumbar 2 or 3 View; Future  -     Ambulatory Referral to Physical Therapy    5. Maxillary sinus mass  Comments:  Acute issue and will see Dr Sanchez next week for likely cyst versus polyp.        FOLLOW-UP:  Return in about 3 months (around 4/18/2024) for Annual physical.      Electronically signed by:    Austyn Carl MD

## 2024-01-18 NOTE — ASSESSMENT & PLAN NOTE
Patient's (Body mass index is 41.77 kg/m².) indicates that they are morbidly/severely obese (BMI > 40 or > 35 with obesity - related health condition) with health conditions that include hypertension, diabetes mellitus, dyslipidemias, GERD, and osteoarthritis . Weight is unchanged. BMI  is above average; BMI management plan is completed. We discussed portion control and increasing exercise.

## 2024-02-21 NOTE — TELEPHONE ENCOUNTER
KATINA Serrano    Your cultures are all negative. Hope you are feeling better.     Take care  FILIPE Holly LS : 2/24/21   - - -

## 2024-03-07 ENCOUNTER — OFFICE VISIT (OUTPATIENT)
Dept: INTERNAL MEDICINE | Facility: CLINIC | Age: 49
End: 2024-03-07
Payer: COMMERCIAL

## 2024-03-07 VITALS
WEIGHT: 281 LBS | HEIGHT: 69 IN | SYSTOLIC BLOOD PRESSURE: 114 MMHG | TEMPERATURE: 98.2 F | BODY MASS INDEX: 41.62 KG/M2 | DIASTOLIC BLOOD PRESSURE: 72 MMHG | HEART RATE: 88 BPM

## 2024-03-07 DIAGNOSIS — E66.01 MORBID OBESITY DUE TO EXCESS CALORIES: ICD-10-CM

## 2024-03-07 DIAGNOSIS — E66.01 TYPE 2 DIABETES MELLITUS WITH MORBID OBESITY: Primary | ICD-10-CM

## 2024-03-07 DIAGNOSIS — E11.69 TYPE 2 DIABETES MELLITUS WITH MORBID OBESITY: Primary | ICD-10-CM

## 2024-03-07 DIAGNOSIS — I10 HYPERTENSION, BENIGN: ICD-10-CM

## 2024-03-07 NOTE — ASSESSMENT & PLAN NOTE
Hypertension is stable and controlled  Continue current treatment regimen.  Blood pressure will be reassessed in 6 months.and in 2 months

## 2024-03-07 NOTE — ASSESSMENT & PLAN NOTE
Patient's (Body mass index is 41.48 kg/m².) indicates that they are morbidly/severely obese (BMI > 40 or > 35 with obesity - related health condition) with health conditions that include hypertension, diabetes mellitus, dyslipidemias, GERD, peripheral vascular disease, and osteoarthritis . Weight is unchanged. BMI  is above average; BMI management plan is completed. We discussed portion control and increasing exercise Add Mounjaro and stop wegovy .

## 2024-03-07 NOTE — ASSESSMENT & PLAN NOTE
Patient's (Body mass index is 41.48 kg/m².) indicates that they are morbidly/severely obese (BMI > 40 or > 35 with obesity - related health condition) with health conditions that include hypertension, diabetes mellitus, and dyslipidemias . Weight is unchanged. BMI  is above average; BMI management plan is completed. We discussed portion control and increasing exercise.

## 2024-03-07 NOTE — PROGRESS NOTES
Chief Complaint   Patient presents with    Weight Check       History of Present Illness  48 y.o. male presents for follow up of weight and blood sugar     Review of Systems   Constitutional:  Negative for chills, diaphoresis and fever.   Respiratory:  Negative for cough and shortness of breath.    Cardiovascular:  Negative for chest pain and palpitations.   Gastrointestinal:  Negative for abdominal pain, nausea and vomiting.   Skin:  Negative for rash.   Neurological:  Negative for dizziness, light-headedness and headaches.   Psychiatric/Behavioral:  Negative for decreased concentration and dysphoric mood.    All other systems reviewed and are negative.    .    PMSFH:  The following portions of the patient's history were reviewed and updated as appropriate: allergies, current medications, past family history, past medical history, past social history, past surgical history and problem list.      Current Outpatient Medications:     Cholecalciferol (Vitamin D3) 50 MCG (2000 UT) capsule, Take 1 capsule by mouth Daily., Disp: 30 capsule, Rfl: 11    Cyanocobalamin (B-12) 1000 MCG tablet, Take  by mouth. Once daily, Disp: , Rfl:     dapagliflozin Propanediol (Farxiga) 10 MG tablet, Take 1 tablet by mouth once daily, Disp: 90 tablet, Rfl: 1    fluticasone (FLONASE) 50 MCG/ACT nasal spray, USE 2 SPRAY(S) IN EACH NOSTRIL ONCE DAILY AS DIRECTED BY PROVIDER, Disp: 16 g, Rfl: 0    lisinopril (PRINIVIL,ZESTRIL) 20 MG tablet, Take 1 tablet by mouth once daily, Disp: 90 tablet, Rfl: 1    omeprazole (priLOSEC) 20 MG capsule, Take 1 capsule by mouth once daily, Disp: 90 capsule, Rfl: 0    Potassium Citrate ER (Urocit-K 15) 15 MEQ (1620 MG) tablet controlled-release, Take 15 mEq by mouth Every 12 (Twelve) Hours., Disp: 180 tablet, Rfl: 1    Psyllium (Konsyl-D) 52.3 % powder, Take with breakfast and supper, Disp: , Rfl:     rosuvastatin (Crestor) 10 MG tablet, Take 1 tablet by mouth Daily., Disp: 90 tablet, Rfl: 1    tamsulosin  "(FLOMAX) 0.4 MG capsule 24 hr capsule, Take 1 capsule by mouth once daily, Disp: 90 capsule, Rfl: 0    venlafaxine XR (EFFEXOR-XR) 75 MG 24 hr capsule, TAKE 1 CAPSULE BY MOUTH ONCE DAILY WITH FOOD, Disp: 90 capsule, Rfl: 1    montelukast (Singulair) 4 MG chewable tablet, Chew 1 tablet Every Night., Disp: 30 tablet, Rfl: 2    Tirzepatide-Weight Management (ZEPBOUND) 2.5 MG/0.5ML solution auto-injector, Inject 0.5 mL under the skin into the appropriate area as directed 1 (One) Time Per Week., Disp: 2 mL, Rfl: 1    VITALS:  /72   Pulse 88   Temp 98.2 °F (36.8 °C)   Ht 175.3 cm (69.02\")   Wt 127 kg (281 lb)   BMI 41.48 kg/m²     Physical Exam  Constitutional:       Appearance: Normal appearance.   Cardiovascular:      Rate and Rhythm: Normal rate and regular rhythm.   Pulmonary:      Effort: Pulmonary effort is normal.   Abdominal:      General: Bowel sounds are normal.      Palpations: Abdomen is soft.      Comments: obese   Neurological:      General: No focal deficit present.      Mental Status: He is alert and oriented to person, place, and time.   Psychiatric:         Mood and Affect: Mood normal.         Behavior: Behavior normal.         LABS:    CMP:  Lab Results   Component Value Date    BUN 10 10/11/2023    CREATININE 1.02 10/11/2023    EGFRIFNONA 90 02/25/2022     10/11/2023    K 4.5 10/11/2023     10/11/2023    CALCIUM 9.8 10/11/2023    ALBUMIN 4.2 10/11/2023    BILITOT 0.4 10/11/2023    ALKPHOS 76 10/11/2023    AST 27 10/11/2023    ALT 35 10/11/2023     CBC:  Lab Results   Component Value Date    WBC 10.57 03/14/2023    RBC 5.30 03/14/2023    HGB 14.1 03/14/2023    HCT 43.5 03/14/2023    MCV 82.1 03/14/2023    MCH 26.6 03/14/2023    MCHC 32.4 03/14/2023    RDW 14.4 03/14/2023     03/14/2023     LIPID PANEL:  Lab Results   Component Value Date    TRIG 100 03/14/2023    HDL 42 03/14/2023    VLDL 19 03/14/2023    LDL 90 03/14/2023    LDLHDL 2.12 03/14/2023     HGBA1C (LAST " 3):  Lab Results   Component Value Date    HGBA1C 5.70 (H) 10/11/2023    HGBA1C 6.10 (H) 03/14/2023    HGBA1C 6.3 12/16/2022     MICROALBUMIN SPOT URINE:  Lab Results   Component Value Date    MICROALBUR <1.2 03/14/2023     Procedures         ASSESSMENT/PLAN:  Diagnoses and all orders for this visit:    1. Type 2 diabetes mellitus with morbid obesity (Primary)  Assessment & Plan:  Patient's (Body mass index is 41.48 kg/m².) indicates that they are morbidly/severely obese (BMI > 40 or > 35 with obesity - related health condition) with health conditions that include hypertension, diabetes mellitus, and dyslipidemias . Weight is unchanged. BMI  is above average; BMI management plan is completed. We discussed portion control and increasing exercise.       2. Hypertension, benign  Assessment & Plan:  Hypertension is stable and controlled  Continue current treatment regimen.  Blood pressure will be reassessed in 6 months.and in 2 months       3. Morbid obesity due to excess calories  Assessment & Plan:  Patient's (Body mass index is 41.48 kg/m².) indicates that they are morbidly/severely obese (BMI > 40 or > 35 with obesity - related health condition) with health conditions that include hypertension, diabetes mellitus, dyslipidemias, GERD, peripheral vascular disease, and osteoarthritis . Weight is unchanged. BMI  is above average; BMI management plan is completed. We discussed portion control and increasing exercise Add Mounjaro and stop wegovy .     Orders:  -     Tirzepatide-Weight Management (ZEPBOUND) 2.5 MG/0.5ML solution auto-injector; Inject 0.5 mL under the skin into the appropriate area as directed 1 (One) Time Per Week.  Dispense: 2 mL; Refill: 1        FOLLOW-UP:  Return for Next scheduled follow up.      Electronically signed by:    Austyn Carl MD

## 2024-03-18 ENCOUNTER — PATIENT MESSAGE (OUTPATIENT)
Dept: INTERNAL MEDICINE | Facility: CLINIC | Age: 49
End: 2024-03-18
Payer: COMMERCIAL

## 2024-03-18 RX ORDER — DULAGLUTIDE 0.75 MG/.5ML
0.75 INJECTION, SOLUTION SUBCUTANEOUS WEEKLY
Qty: 0.5 ML | Refills: 5 | Status: SHIPPED | OUTPATIENT
Start: 2024-03-18

## 2024-03-18 NOTE — TELEPHONE ENCOUNTER
From: Sumanth Alfaro  To: Austyn Carl  Sent: 3/18/2024 9:46 AM EDT  Subject: Medication    Sorry to bother you. My insurance covers the Zepbound but my co pay is still $550 so I guess I will stay on trulicity until Wegovy is more readily available or Zepbound comes down in price. Sorry for all the back and forth on this issue. Also, my dad was prescribed Farxiga for his kidney issues but his insurance doesn't cover it. Are there other options for him? Thanks!

## 2024-03-21 RX ORDER — OMEPRAZOLE 20 MG/1
20 CAPSULE, DELAYED RELEASE ORAL DAILY
Qty: 90 CAPSULE | Refills: 0 | Status: SHIPPED | OUTPATIENT
Start: 2024-03-21

## 2024-03-22 RX ORDER — LISINOPRIL 20 MG/1
TABLET ORAL
Qty: 90 TABLET | Refills: 0 | Status: SHIPPED | OUTPATIENT
Start: 2024-03-22

## 2024-03-22 RX ORDER — VENLAFAXINE HYDROCHLORIDE 75 MG/1
CAPSULE, EXTENDED RELEASE ORAL
Qty: 90 CAPSULE | Refills: 0 | Status: SHIPPED | OUTPATIENT
Start: 2024-03-22

## 2024-04-19 RX ORDER — FLUTICASONE PROPIONATE 50 MCG
SPRAY, SUSPENSION (ML) NASAL
Qty: 16 G | Refills: 0 | Status: SHIPPED | OUTPATIENT
Start: 2024-04-19

## 2024-04-19 NOTE — TELEPHONE ENCOUNTER
Rx Refill Note  Requested Prescriptions     Pending Prescriptions Disp Refills    fluticasone (FLONASE) 50 MCG/ACT nasal spray [Pharmacy Med Name: Fluticasone Propionate 50 MCG/ACT Nasal Suspension] 16 g 0     Sig: INSTILL 2 SPRAYS IN EACH NOSTRIL ONCE DAILY AS DIRECTED BY PROVIDER      Last office visit with prescribing clinician: 3/7/2024   Last telemedicine visit with prescribing clinician: Visit date not found   Next office visit with prescribing clinician: 5/1/2024                         Would you like a call back once the refill request has been completed: [] Yes [] No    If the office needs to give you a call back, can they leave a voicemail: [] Yes [] No    Pati Cotton MA  04/19/24, 11:52 EDT

## 2024-04-22 ENCOUNTER — OFFICE VISIT (OUTPATIENT)
Dept: INTERNAL MEDICINE | Facility: CLINIC | Age: 49
End: 2024-04-22
Payer: COMMERCIAL

## 2024-04-22 VITALS
TEMPERATURE: 98.8 F | WEIGHT: 280 LBS | HEIGHT: 69 IN | HEART RATE: 80 BPM | BODY MASS INDEX: 41.47 KG/M2 | DIASTOLIC BLOOD PRESSURE: 82 MMHG | SYSTOLIC BLOOD PRESSURE: 120 MMHG

## 2024-04-22 DIAGNOSIS — E11.65 TYPE 2 DIABETES MELLITUS WITH HYPERGLYCEMIA, WITHOUT LONG-TERM CURRENT USE OF INSULIN: ICD-10-CM

## 2024-04-22 DIAGNOSIS — L05.01 SACROCOCCYGEAL PILONIDAL CYST WITH ABSCESS: Primary | ICD-10-CM

## 2024-04-22 PROCEDURE — 96372 THER/PROPH/DIAG INJ SC/IM: CPT | Performed by: INTERNAL MEDICINE

## 2024-04-22 PROCEDURE — 99214 OFFICE O/P EST MOD 30 MIN: CPT | Performed by: INTERNAL MEDICINE

## 2024-04-22 RX ORDER — CEFTRIAXONE 1 G/1
1 INJECTION, POWDER, FOR SOLUTION INTRAMUSCULAR; INTRAVENOUS ONCE
Status: COMPLETED | OUTPATIENT
Start: 2024-04-22 | End: 2024-04-22

## 2024-04-22 RX ORDER — AMOXICILLIN AND CLAVULANATE POTASSIUM 875; 125 MG/1; MG/1
1 TABLET, FILM COATED ORAL 2 TIMES DAILY
Qty: 14 TABLET | Refills: 0 | Status: SHIPPED | OUTPATIENT
Start: 2024-04-22 | End: 2024-04-29

## 2024-04-22 RX ADMIN — CEFTRIAXONE 1 G: 1 INJECTION, POWDER, FOR SOLUTION INTRAMUSCULAR; INTRAVENOUS at 12:03

## 2024-04-22 NOTE — PROGRESS NOTES
Chief Complaint   Patient presents with    Mass     right       History of Present Illness  49 y.o. male presents for evaluation of buttocks pass. He has had some relief with drainage.     Review of Systems   Gastrointestinal:  Negative for abdominal pain and blood in stool.        Buttocks pain   Skin:  Positive for rash (drainage of buttocks).     .    PMSFH:  The following portions of the patient's history were reviewed and updated as appropriate: allergies, current medications, past family history, past medical history, past social history, past surgical history and problem list.      Current Outpatient Medications:     Cholecalciferol (Vitamin D3) 50 MCG (2000 UT) capsule, Take 1 capsule by mouth Daily., Disp: 30 capsule, Rfl: 11    Cyanocobalamin (B-12) 1000 MCG tablet, Take  by mouth. Once daily, Disp: , Rfl:     dapagliflozin Propanediol (Farxiga) 10 MG tablet, Take 1 tablet by mouth once daily, Disp: 90 tablet, Rfl: 1    Dulaglutide (Trulicity) 0.75 MG/0.5ML solution pen-injector, Inject 0.75 mg under the skin into the appropriate area as directed 1 (One) Time Per Week., Disp: 0.5 mL, Rfl: 5    fluticasone (FLONASE) 50 MCG/ACT nasal spray, INSTILL 2 SPRAYS IN EACH NOSTRIL ONCE DAILY AS DIRECTED BY PROVIDER, Disp: 16 g, Rfl: 0    lisinopril (PRINIVIL,ZESTRIL) 20 MG tablet, Take 1 tablet by mouth once daily, Disp: 90 tablet, Rfl: 0    omeprazole (priLOSEC) 20 MG capsule, Take 1 capsule by mouth once daily, Disp: 90 capsule, Rfl: 0    Potassium Citrate ER (Urocit-K 15) 15 MEQ (1620 MG) tablet controlled-release, Take 15 mEq by mouth Every 12 (Twelve) Hours., Disp: 180 tablet, Rfl: 1    Psyllium (Konsyl-D) 52.3 % powder, Take with breakfast and supper, Disp: , Rfl:     rosuvastatin (Crestor) 10 MG tablet, Take 1 tablet by mouth Daily., Disp: 90 tablet, Rfl: 1    tamsulosin (FLOMAX) 0.4 MG capsule 24 hr capsule, Take 1 capsule by mouth once daily, Disp: 90 capsule, Rfl: 0    venlafaxine XR (EFFEXOR-XR) 75 MG 24  "hr capsule, TAKE 1 CAPSULE BY MOUTH ONCE DAILY WITH FOOD, Disp: 90 capsule, Rfl: 0    amoxicillin-clavulanate (AUGMENTIN) 875-125 MG per tablet, Take 1 tablet by mouth 2 (Two) Times a Day for 7 days., Disp: 14 tablet, Rfl: 0  No current facility-administered medications for this visit.    VITALS:  /82   Pulse 80   Temp 98.8 °F (37.1 °C)   Ht 175.3 cm (69.02\")   Wt 127 kg (280 lb)   BMI 41.33 kg/m²     Physical Exam  Cardiovascular:      Rate and Rhythm: Normal rate and regular rhythm.   Pulmonary:      Breath sounds: No wheezing or rales.   Abdominal:      General: There is no distension.      Tenderness: There is no abdominal tenderness.   Genitourinary:     Comments: Rectal abscess with tenderness  Neurological:      General: No focal deficit present.      Mental Status: He is oriented to person, place, and time.   Psychiatric:         Mood and Affect: Mood normal.         Behavior: Behavior normal.         LABS:    CMP:  Lab Results   Component Value Date    BUN 10 10/11/2023    CREATININE 1.02 10/11/2023    EGFRIFNONA 90 02/25/2022     10/11/2023    K 4.5 10/11/2023     10/11/2023    CALCIUM 9.8 10/11/2023    ALBUMIN 4.2 10/11/2023    BILITOT 0.4 10/11/2023    ALKPHOS 76 10/11/2023    AST 27 10/11/2023    ALT 35 10/11/2023     CBC:  Lab Results   Component Value Date    WBC 10.57 03/14/2023    RBC 5.30 03/14/2023    HGB 14.1 03/14/2023    HCT 43.5 03/14/2023    MCV 82.1 03/14/2023    MCH 26.6 03/14/2023    MCHC 32.4 03/14/2023    RDW 14.4 03/14/2023     03/14/2023     HGBA1C (LAST 3):  Lab Results   Component Value Date    HGBA1C 5.70 (H) 10/11/2023    HGBA1C 6.10 (H) 03/14/2023    HGBA1C 6.3 12/16/2022     Procedures         ASSESSMENT/PLAN:  Diagnoses and all orders for this visit:    1. Sacrococcygeal pilonidal cyst with abscess (Primary)  Assessment & Plan:  Use rocephin and sitz bath and start augmentin tomorrow.     Orders:  -     amoxicillin-clavulanate (AUGMENTIN) 875-125 MG " per tablet; Take 1 tablet by mouth 2 (Two) Times a Day for 7 days.  Dispense: 14 tablet; Refill: 0  -     cefTRIAXone (ROCEPHIN) injection 1 g    2. Type 2 diabetes mellitus with hyperglycemia, without long-term current use of insulin  Assessment & Plan:  Emphasized good control blood sugar important to help heal infection and inflammation           FOLLOW-UP:  No follow-ups on file.      Electronically signed by:    Austyn Carl MD

## 2024-05-01 ENCOUNTER — OFFICE VISIT (OUTPATIENT)
Dept: INTERNAL MEDICINE | Facility: CLINIC | Age: 49
End: 2024-05-01
Payer: COMMERCIAL

## 2024-05-01 ENCOUNTER — LAB (OUTPATIENT)
Dept: LAB | Facility: HOSPITAL | Age: 49
End: 2024-05-01
Payer: COMMERCIAL

## 2024-05-01 VITALS
BODY MASS INDEX: 41.77 KG/M2 | HEART RATE: 82 BPM | SYSTOLIC BLOOD PRESSURE: 120 MMHG | WEIGHT: 282 LBS | DIASTOLIC BLOOD PRESSURE: 72 MMHG | TEMPERATURE: 97.8 F | HEIGHT: 69 IN

## 2024-05-01 DIAGNOSIS — E78.5 HYPERLIPIDEMIA LDL GOAL <70: ICD-10-CM

## 2024-05-01 DIAGNOSIS — E66.01 TYPE 2 DIABETES MELLITUS WITH MORBID OBESITY: ICD-10-CM

## 2024-05-01 DIAGNOSIS — R79.89 HIGH SERUM VITAMIN B12: ICD-10-CM

## 2024-05-01 DIAGNOSIS — E11.69 TYPE 2 DIABETES MELLITUS WITH MORBID OBESITY: ICD-10-CM

## 2024-05-01 DIAGNOSIS — Z00.00 PREVENTATIVE HEALTH CARE: Primary | ICD-10-CM

## 2024-05-01 DIAGNOSIS — Z12.11 COLON CANCER SCREENING: ICD-10-CM

## 2024-05-01 DIAGNOSIS — I10 HYPERTENSION, BENIGN: ICD-10-CM

## 2024-05-01 DIAGNOSIS — L05.01 SACROCOCCYGEAL PILONIDAL CYST WITH ABSCESS: ICD-10-CM

## 2024-05-01 DIAGNOSIS — E55.9 VITAMIN D DEFICIENCY: ICD-10-CM

## 2024-05-01 DIAGNOSIS — Z12.5 SCREENING PSA (PROSTATE SPECIFIC ANTIGEN): ICD-10-CM

## 2024-05-01 DIAGNOSIS — E66.01 MORBID OBESITY DUE TO EXCESS CALORIES: ICD-10-CM

## 2024-05-01 DIAGNOSIS — E11.65 TYPE 2 DIABETES MELLITUS WITH HYPERGLYCEMIA, WITHOUT LONG-TERM CURRENT USE OF INSULIN: ICD-10-CM

## 2024-05-01 LAB
25(OH)D3 SERPL-MCNC: 28.3 NG/ML (ref 30–100)
ALBUMIN SERPL-MCNC: 4 G/DL (ref 3.5–5.2)
ALBUMIN UR-MCNC: <1.2 MG/DL
ALBUMIN/GLOB SERPL: 1.1 G/DL
ALP SERPL-CCNC: 65 U/L (ref 39–117)
ALT SERPL W P-5'-P-CCNC: 30 U/L (ref 1–41)
ANION GAP SERPL CALCULATED.3IONS-SCNC: 9.2 MMOL/L (ref 5–15)
AST SERPL-CCNC: 31 U/L (ref 1–40)
BASOPHILS # BLD AUTO: 0.08 10*3/MM3 (ref 0–0.2)
BASOPHILS NFR BLD AUTO: 0.8 % (ref 0–1.5)
BILIRUB SERPL-MCNC: 0.5 MG/DL (ref 0–1.2)
BUN SERPL-MCNC: 9 MG/DL (ref 6–20)
BUN/CREAT SERPL: 10.7 (ref 7–25)
CALCIUM SPEC-SCNC: 9.4 MG/DL (ref 8.6–10.5)
CHLORIDE SERPL-SCNC: 105 MMOL/L (ref 98–107)
CHOLEST SERPL-MCNC: 152 MG/DL (ref 0–200)
CO2 SERPL-SCNC: 23.8 MMOL/L (ref 22–29)
CREAT SERPL-MCNC: 0.84 MG/DL (ref 0.76–1.27)
CREAT UR-MCNC: 201 MG/DL
DEPRECATED RDW RBC AUTO: 41.8 FL (ref 37–54)
EGFRCR SERPLBLD CKD-EPI 2021: 105.6 ML/MIN/1.73
EOSINOPHIL # BLD AUTO: 0.17 10*3/MM3 (ref 0–0.4)
EOSINOPHIL NFR BLD AUTO: 1.6 % (ref 0.3–6.2)
ERYTHROCYTE [DISTWIDTH] IN BLOOD BY AUTOMATED COUNT: 14.3 % (ref 12.3–15.4)
FOLATE SERPL-MCNC: 10.7 NG/ML (ref 4.78–24.2)
GLOBULIN UR ELPH-MCNC: 3.6 GM/DL
GLUCOSE SERPL-MCNC: 73 MG/DL (ref 65–99)
HBA1C MFR BLD: 5.8 % (ref 4.8–5.6)
HCT VFR BLD AUTO: 42.8 % (ref 37.5–51)
HCYS SERPL-MCNC: 13.6 UMOL/L (ref 0–15)
HDLC SERPL-MCNC: 41 MG/DL (ref 40–60)
HGB BLD-MCNC: 13.8 G/DL (ref 13–17.7)
IMM GRANULOCYTES # BLD AUTO: 0.04 10*3/MM3 (ref 0–0.05)
IMM GRANULOCYTES NFR BLD AUTO: 0.4 % (ref 0–0.5)
LDLC SERPL CALC-MCNC: 93 MG/DL (ref 0–100)
LDLC/HDLC SERPL: 2.23 {RATIO}
LYMPHOCYTES # BLD AUTO: 3.8 10*3/MM3 (ref 0.7–3.1)
LYMPHOCYTES NFR BLD AUTO: 36.4 % (ref 19.6–45.3)
MCH RBC QN AUTO: 26.2 PG (ref 26.6–33)
MCHC RBC AUTO-ENTMCNC: 32.2 G/DL (ref 31.5–35.7)
MCV RBC AUTO: 81.4 FL (ref 79–97)
MICROALBUMIN/CREAT UR: NORMAL MG/G{CREAT}
MONOCYTES # BLD AUTO: 0.62 10*3/MM3 (ref 0.1–0.9)
MONOCYTES NFR BLD AUTO: 5.9 % (ref 5–12)
NEUTROPHILS NFR BLD AUTO: 5.74 10*3/MM3 (ref 1.7–7)
NEUTROPHILS NFR BLD AUTO: 54.9 % (ref 42.7–76)
NRBC BLD AUTO-RTO: 0 /100 WBC (ref 0–0.2)
PLATELET # BLD AUTO: 360 10*3/MM3 (ref 140–450)
PMV BLD AUTO: 9.2 FL (ref 6–12)
POTASSIUM SERPL-SCNC: 4.5 MMOL/L (ref 3.5–5.2)
PROT SERPL-MCNC: 7.6 G/DL (ref 6–8.5)
PSA SERPL-MCNC: 0.87 NG/ML (ref 0–4)
RBC # BLD AUTO: 5.26 10*6/MM3 (ref 4.14–5.8)
SODIUM SERPL-SCNC: 138 MMOL/L (ref 136–145)
TRIGL SERPL-MCNC: 98 MG/DL (ref 0–150)
TSH SERPL DL<=0.05 MIU/L-ACNC: 2.08 UIU/ML (ref 0.27–4.2)
VIT B12 BLD-MCNC: 538 PG/ML (ref 211–946)
VLDLC SERPL-MCNC: 18 MG/DL (ref 5–40)
WBC NRBC COR # BLD AUTO: 10.45 10*3/MM3 (ref 3.4–10.8)

## 2024-05-01 PROCEDURE — 80050 GENERAL HEALTH PANEL: CPT

## 2024-05-01 PROCEDURE — 82746 ASSAY OF FOLIC ACID SERUM: CPT

## 2024-05-01 PROCEDURE — G0103 PSA SCREENING: HCPCS

## 2024-05-01 PROCEDURE — 82607 VITAMIN B-12: CPT

## 2024-05-01 PROCEDURE — 82043 UR ALBUMIN QUANTITATIVE: CPT

## 2024-05-01 PROCEDURE — 83090 ASSAY OF HOMOCYSTEINE: CPT

## 2024-05-01 PROCEDURE — 80061 LIPID PANEL: CPT

## 2024-05-01 PROCEDURE — 82570 ASSAY OF URINE CREATININE: CPT

## 2024-05-01 PROCEDURE — 99396 PREV VISIT EST AGE 40-64: CPT | Performed by: INTERNAL MEDICINE

## 2024-05-01 PROCEDURE — 83036 HEMOGLOBIN GLYCOSYLATED A1C: CPT

## 2024-05-01 PROCEDURE — 82306 VITAMIN D 25 HYDROXY: CPT

## 2024-05-01 RX ORDER — AMOXICILLIN AND CLAVULANATE POTASSIUM 875; 125 MG/1; MG/1
1 TABLET, FILM COATED ORAL 2 TIMES DAILY
Qty: 14 TABLET | Refills: 0 | Status: SHIPPED | OUTPATIENT
Start: 2024-05-01 | End: 2024-05-08

## 2024-05-01 NOTE — PROGRESS NOTES
Chief Complaint   Patient presents with    Annual Exam     fasting       History of Present Illness  49 y.o. male presents for wellness exam. He has acute issue of anal pain and swelling    Review of Systems   Constitutional:  Negative for diaphoresis and fever.   HENT:  Positive for postnasal drip.    Gastrointestinal:  Negative for abdominal pain.        Rectal pain but no drainage now.    Musculoskeletal:  Negative for back pain.   Psychiatric/Behavioral:  Negative for decreased concentration and dysphoric mood.      .    PMSFH:  The following portions of the patient's history were reviewed and updated as appropriate: allergies, current medications, past family history, past medical history, past social history, past surgical history and problem list.      Current Outpatient Medications:     Cholecalciferol (Vitamin D3) 50 MCG (2000 UT) capsule, Take 1 capsule by mouth Daily., Disp: 30 capsule, Rfl: 11    Cyanocobalamin (B-12) 1000 MCG tablet, Take  by mouth. Once daily, Disp: , Rfl:     dapagliflozin Propanediol (Farxiga) 10 MG tablet, Take 1 tablet by mouth once daily, Disp: 90 tablet, Rfl: 1    Dulaglutide (Trulicity) 0.75 MG/0.5ML solution pen-injector, Inject 0.75 mg under the skin into the appropriate area as directed 1 (One) Time Per Week., Disp: 0.5 mL, Rfl: 5    fluticasone (FLONASE) 50 MCG/ACT nasal spray, INSTILL 2 SPRAYS IN EACH NOSTRIL ONCE DAILY AS DIRECTED BY PROVIDER, Disp: 16 g, Rfl: 0    lisinopril (PRINIVIL,ZESTRIL) 20 MG tablet, Take 1 tablet by mouth once daily, Disp: 90 tablet, Rfl: 0    omeprazole (priLOSEC) 20 MG capsule, Take 1 capsule by mouth once daily, Disp: 90 capsule, Rfl: 0    Potassium Citrate ER (Urocit-K 15) 15 MEQ (1620 MG) tablet controlled-release, Take 15 mEq by mouth Every 12 (Twelve) Hours., Disp: 180 tablet, Rfl: 1    Psyllium (Konsyl-D) 52.3 % powder, Take with breakfast and supper, Disp: , Rfl:     rosuvastatin (Crestor) 10 MG tablet, Take 1 tablet by mouth Daily.,  "Disp: 90 tablet, Rfl: 1    tamsulosin (FLOMAX) 0.4 MG capsule 24 hr capsule, Take 1 capsule by mouth once daily, Disp: 90 capsule, Rfl: 0    venlafaxine XR (EFFEXOR-XR) 75 MG 24 hr capsule, TAKE 1 CAPSULE BY MOUTH ONCE DAILY WITH FOOD, Disp: 90 capsule, Rfl: 0    amoxicillin-clavulanate (AUGMENTIN) 875-125 MG per tablet, Take 1 tablet by mouth 2 (Two) Times a Day for 7 days., Disp: 14 tablet, Rfl: 0    VITALS:  /72   Pulse 82   Temp 97.8 °F (36.6 °C)   Ht 175.3 cm (69.02\")   Wt 128 kg (282 lb)   BMI 41.62 kg/m²     Physical Exam  Constitutional:       Appearance: Normal appearance.   HENT:      Right Ear: Tympanic membrane and ear canal normal.      Left Ear: Tympanic membrane and ear canal normal.   Eyes:      Conjunctiva/sclera: Conjunctivae normal.   Neck:      Vascular: No carotid bruit.   Cardiovascular:      Rate and Rhythm: Normal rate and regular rhythm.   Pulmonary:      Effort: Pulmonary effort is normal.   Abdominal:      General: Bowel sounds are normal.      Palpations: Abdomen is soft.      Comments: obese   Genitourinary:     Comments: No prostate nodules appreciated cystic nodule left buttocks   Neurological:      General: No focal deficit present.      Mental Status: He is alert and oriented to person, place, and time.   Psychiatric:         Mood and Affect: Mood normal.         LABS:    CMP:  Lab Results   Component Value Date    BUN 9 05/01/2024    CREATININE 0.84 05/01/2024    EGFRIFNONA 90 02/25/2022     05/01/2024    K 4.5 05/01/2024     05/01/2024    CALCIUM 9.4 05/01/2024    ALBUMIN 4.0 05/01/2024    BILITOT 0.5 05/01/2024    ALKPHOS 65 05/01/2024    AST 31 05/01/2024    ALT 30 05/01/2024     CBC:  Lab Results   Component Value Date    WBC 10.45 05/01/2024    RBC 5.26 05/01/2024    HGB 13.8 05/01/2024    HCT 42.8 05/01/2024    MCV 81.4 05/01/2024    MCH 26.2 (L) 05/01/2024    MCHC 32.2 05/01/2024    RDW 14.3 05/01/2024     05/01/2024     LIPID PANEL:  Lab Results "   Component Value Date    TRIG 98 05/01/2024    HDL 41 05/01/2024    VLDL 18 05/01/2024    LDL 93 05/01/2024    LDLHDL 2.23 05/01/2024     TSH:  Lab Results   Component Value Date    TSH 2.080 05/01/2024     HGBA1C (LAST 3):  Lab Results   Component Value Date    HGBA1C 5.80 (H) 05/01/2024    HGBA1C 5.70 (H) 10/11/2023    HGBA1C 6.10 (H) 03/14/2023     MICROALBUMIN SPOT URINE:  Lab Results   Component Value Date    MICROALBUR <1.2 05/01/2024     Procedures         ASSESSMENT/PLAN:  Diagnoses and all orders for this visit:    1. Preventative health care (Primary)    2. Type 2 diabetes mellitus with hyperglycemia, without long-term current use of insulin    3. Type 2 diabetes mellitus with morbid obesity  -     Hemoglobin A1c; Future    4. Sacrococcygeal pilonidal cyst with abscess  -     Ambulatory Referral to Colorectal Surgery  -     amoxicillin-clavulanate (AUGMENTIN) 875-125 MG per tablet; Take 1 tablet by mouth 2 (Two) Times a Day for 7 days.  Dispense: 14 tablet; Refill: 0    5. Morbid obesity due to excess calories    6. Hypertension, benign    7. Hyperlipidemia LDL goal <70  -     Homocysteine; Future  -     CBC & Differential; Future  -     Comprehensive Metabolic Panel; Future  -     Hemoglobin A1c; Future  -     Lipid Panel; Future  -     Microalbumin / Creatinine Urine Ratio - Urine, Clean Catch; Future  -     TSH Rfx On Abnormal To Free T4; Future    8. Vitamin D deficiency  -     Vitamin D,25-Hydroxy; Future    9. High serum vitamin B12  -     Folate; Future  -     Vitamin B12; Future    10. Screening PSA (prostate specific antigen)  -     PSA Screen; Future    11. Colon cancer screening        FOLLOW-UP:  Return in about 3 months (around 8/1/2024) for Recheck, Labs this visit.      Electronically signed by:    Austyn Carl MD

## 2024-05-02 DIAGNOSIS — E11.69 TYPE 2 DIABETES MELLITUS WITH MORBID OBESITY: ICD-10-CM

## 2024-05-02 DIAGNOSIS — E66.01 TYPE 2 DIABETES MELLITUS WITH MORBID OBESITY: ICD-10-CM

## 2024-05-02 RX ORDER — DAPAGLIFLOZIN 10 MG/1
1 TABLET, FILM COATED ORAL DAILY
Qty: 90 TABLET | Refills: 0 | Status: SHIPPED | OUTPATIENT
Start: 2024-05-02

## 2024-05-02 RX ORDER — LISINOPRIL 20 MG/1
TABLET ORAL
Qty: 90 TABLET | Refills: 0 | Status: SHIPPED | OUTPATIENT
Start: 2024-05-02

## 2024-05-02 RX ORDER — OMEPRAZOLE 20 MG/1
20 CAPSULE, DELAYED RELEASE ORAL DAILY
Qty: 90 CAPSULE | Refills: 0 | Status: SHIPPED | OUTPATIENT
Start: 2024-05-02

## 2024-05-02 RX ORDER — VENLAFAXINE HYDROCHLORIDE 75 MG/1
CAPSULE, EXTENDED RELEASE ORAL
Qty: 90 CAPSULE | Refills: 0 | Status: SHIPPED | OUTPATIENT
Start: 2024-05-02

## 2024-05-14 ENCOUNTER — TELEPHONE (OUTPATIENT)
Dept: INTERNAL MEDICINE | Facility: CLINIC | Age: 49
End: 2024-05-14

## 2024-05-20 ENCOUNTER — LAB (OUTPATIENT)
Dept: LAB | Facility: HOSPITAL | Age: 49
End: 2024-05-20
Payer: COMMERCIAL

## 2024-05-20 ENCOUNTER — OFFICE VISIT (OUTPATIENT)
Dept: INTERNAL MEDICINE | Facility: CLINIC | Age: 49
End: 2024-05-20
Payer: COMMERCIAL

## 2024-05-20 VITALS
WEIGHT: 280 LBS | BODY MASS INDEX: 41.47 KG/M2 | HEIGHT: 69 IN | SYSTOLIC BLOOD PRESSURE: 108 MMHG | HEART RATE: 76 BPM | DIASTOLIC BLOOD PRESSURE: 72 MMHG | TEMPERATURE: 98.2 F

## 2024-05-20 DIAGNOSIS — Z01.810 PREOP CARDIOVASCULAR EXAM: Primary | ICD-10-CM

## 2024-05-20 DIAGNOSIS — K60.3 PERIANAL FISTULA: ICD-10-CM

## 2024-05-20 DIAGNOSIS — I47.9 TACHYCARDIA, PAROXYSMAL: ICD-10-CM

## 2024-05-20 DIAGNOSIS — E66.01 MORBID OBESITY DUE TO EXCESS CALORIES: ICD-10-CM

## 2024-05-20 PROBLEM — K60.30 PERIANAL FISTULA: Status: ACTIVE | Noted: 2024-05-20

## 2024-05-20 PROCEDURE — 80048 BASIC METABOLIC PNL TOTAL CA: CPT

## 2024-05-20 PROCEDURE — 99214 OFFICE O/P EST MOD 30 MIN: CPT | Performed by: INTERNAL MEDICINE

## 2024-05-20 PROCEDURE — 93000 ELECTROCARDIOGRAM COMPLETE: CPT | Performed by: INTERNAL MEDICINE

## 2024-05-20 PROCEDURE — 83735 ASSAY OF MAGNESIUM: CPT

## 2024-05-20 RX ORDER — SEMAGLUTIDE 0.5 MG/.5ML
0.5 INJECTION, SOLUTION SUBCUTANEOUS
Qty: 2 ML | Refills: 3 | Status: SHIPPED | OUTPATIENT
Start: 2024-05-20

## 2024-05-20 RX ORDER — SEMAGLUTIDE 0.25 MG/.5ML
INJECTION, SOLUTION SUBCUTANEOUS
COMMUNITY
End: 2024-05-20

## 2024-05-20 NOTE — ASSESSMENT & PLAN NOTE
Acute issue of mildly fast heart rate and will check bmp and magnesium and hold on thyroid as was good 2 weeks ago. Get to Heart valve clinic

## 2024-05-20 NOTE — ASSESSMENT & PLAN NOTE
Patient's (Body mass index is 41.33 kg/m².) indicates that they are morbidly/severely obese (BMI > 40 or > 35 with obesity - related health condition) with health conditions that include obstructive sleep apnea, hypertension, diabetes mellitus, dyslipidemias, GERD, and osteoarthritis . Weight is improving with treatment. BMI  is above average; BMI management plan is completed. We discussed portion control and increasing exercise. Use the wegovy

## 2024-05-21 LAB
ANION GAP SERPL CALCULATED.3IONS-SCNC: 12.8 MMOL/L (ref 5–15)
BUN SERPL-MCNC: 7 MG/DL (ref 6–20)
BUN/CREAT SERPL: 6.9 (ref 7–25)
CALCIUM SPEC-SCNC: 9.6 MG/DL (ref 8.6–10.5)
CHLORIDE SERPL-SCNC: 104 MMOL/L (ref 98–107)
CO2 SERPL-SCNC: 23.2 MMOL/L (ref 22–29)
CREAT SERPL-MCNC: 1.01 MG/DL (ref 0.76–1.27)
EGFRCR SERPLBLD CKD-EPI 2021: 90 ML/MIN/1.73
GLUCOSE SERPL-MCNC: 93 MG/DL (ref 65–99)
MAGNESIUM SERPL-MCNC: 2.2 MG/DL (ref 1.6–2.6)
POTASSIUM SERPL-SCNC: 3.8 MMOL/L (ref 3.5–5.2)
SODIUM SERPL-SCNC: 140 MMOL/L (ref 136–145)

## 2024-05-24 ENCOUNTER — OFFICE VISIT (OUTPATIENT)
Dept: CARDIOLOGY | Facility: CLINIC | Age: 49
End: 2024-05-24
Payer: COMMERCIAL

## 2024-05-24 VITALS
HEART RATE: 88 BPM | WEIGHT: 280 LBS | HEIGHT: 69 IN | SYSTOLIC BLOOD PRESSURE: 124 MMHG | BODY MASS INDEX: 41.47 KG/M2 | DIASTOLIC BLOOD PRESSURE: 70 MMHG | OXYGEN SATURATION: 97 %

## 2024-05-24 DIAGNOSIS — E78.5 HYPERLIPIDEMIA, UNSPECIFIED HYPERLIPIDEMIA TYPE: Primary | ICD-10-CM

## 2024-05-24 RX ORDER — METOPROLOL SUCCINATE 100 MG/1
TABLET, EXTENDED RELEASE ORAL
Qty: 1 TABLET | Refills: 0 | Status: SHIPPED | OUTPATIENT
Start: 2024-05-24

## 2024-05-24 NOTE — PROGRESS NOTES
"Chief Complaint  Cardiac Clearance ([facility])      Subjective   History of Present Illness    Problem List  -Preop for anal fistula  -HTN  -EKG normal    MR. Alfaro is a 49 year old man being seen for preop.  No CV complaints.  Pretty active as high school .  ROS negative except for the above.           Objective   Vital Signs:  Vitals:    05/24/24 1459   BP: 124/70   Pulse: 88   SpO2: 97%     Estimated body mass index is 41.35 kg/m² as calculated from the following:    Height as of this encounter: 175.3 cm (69\").    Weight as of this encounter: 127 kg (280 lb).       Physical Exam  HENT:      Head: Normocephalic.   Eyes:      Extraocular Movements: Extraocular movements intact.   Cardiovascular:      Rate and Rhythm: Normal rate and regular rhythm.      Heart sounds: No murmur heard.     No gallop.   Pulmonary:      Breath sounds: Normal breath sounds.   Abdominal:      Palpations: Abdomen is soft.   Musculoskeletal:      Right lower leg: No edema.      Left lower leg: No edema.   Skin:     General: Skin is warm and dry.   Neurological:      General: No focal deficit present.      Mental Status: He is alert.   Psychiatric:         Mood and Affect: Mood normal.               Assessment   -Preop for anal fistula  -HTN  -EKG normal    Plan   -computer mis read his EKG, his EKG is normal.  Can proceed with surgery without further CV eval  -CT calcium score    Return in about 3 months (around 8/24/2024).  Sandeep Lea MD  05/24/2024 15:17 EDT     "

## 2024-05-27 RX ORDER — POTASSIUM CHLORIDE 750 MG/1
10 TABLET, EXTENDED RELEASE ORAL 2 TIMES DAILY
Qty: 90 TABLET | Refills: 1 | Status: SHIPPED | OUTPATIENT
Start: 2024-05-27

## 2024-06-06 ENCOUNTER — HOSPITAL ENCOUNTER (OUTPATIENT)
Dept: CT IMAGING | Facility: HOSPITAL | Age: 49
Discharge: HOME | End: 2024-06-06
Admitting: INTERNAL MEDICINE

## 2024-06-06 DIAGNOSIS — E78.5 HYPERLIPIDEMIA, UNSPECIFIED HYPERLIPIDEMIA TYPE: ICD-10-CM

## 2024-06-06 PROCEDURE — 75571 CT HRT W/O DYE W/CA TEST: CPT

## 2024-06-07 ENCOUNTER — TELEPHONE (OUTPATIENT)
Dept: CARDIOLOGY | Facility: CLINIC | Age: 49
End: 2024-06-07
Payer: COMMERCIAL

## 2024-06-07 DIAGNOSIS — E78.5 HYPERLIPIDEMIA LDL GOAL <70: Primary | ICD-10-CM

## 2024-06-07 NOTE — TELEPHONE ENCOUNTER
----- Message from Sandeep Lea sent at 6/7/2024  3:25 PM EDT -----  CAC 12.  Would suggest getting cmp, hscrp, lipid panel.  Will likely suggest lipid lowering therapy.

## 2024-06-07 NOTE — PROGRESS NOTES
CAC 12.  Would suggest getting cmp, hscrp, lipid panel.  Will likely suggest lipid lowering therapy.

## 2024-06-18 RX ORDER — VENLAFAXINE HYDROCHLORIDE 75 MG/1
CAPSULE, EXTENDED RELEASE ORAL
Qty: 90 CAPSULE | Refills: 0 | Status: SHIPPED | OUTPATIENT
Start: 2024-06-18

## 2024-06-18 RX ORDER — LISINOPRIL 20 MG/1
TABLET ORAL
Qty: 90 TABLET | Refills: 0 | Status: SHIPPED | OUTPATIENT
Start: 2024-06-18

## 2024-07-08 RX ORDER — FLUTICASONE PROPIONATE 50 MCG
SPRAY, SUSPENSION (ML) NASAL
Qty: 16 G | Refills: 0 | Status: SHIPPED | OUTPATIENT
Start: 2024-07-08

## 2024-07-09 DIAGNOSIS — E66.01 MORBID OBESITY DUE TO EXCESS CALORIES: ICD-10-CM

## 2024-07-09 RX ORDER — SEMAGLUTIDE 1 MG/.5ML
INJECTION, SOLUTION SUBCUTANEOUS
Qty: 4 ML | Refills: 0 | Status: SHIPPED | OUTPATIENT
Start: 2024-07-09

## 2024-07-26 ENCOUNTER — PATIENT MESSAGE (OUTPATIENT)
Dept: INTERNAL MEDICINE | Facility: CLINIC | Age: 49
End: 2024-07-26
Payer: COMMERCIAL

## 2024-07-26 NOTE — TELEPHONE ENCOUNTER
From: Sumanth Alfaro  To: Austyn Carl  Sent: 7/26/2024 11:42 AM EDT  Subject: Domperidone    Hi Dr. Carl,    As you know Dr. Hardeep Morillo is out until September. He has had my dad, Sea Alfaro, on Domperidone, a drug from [facility], for several years. Dr. Morillo former office said that until he gets into his new office, his patients have been having their GP prescribe these meds until he gets back into the office. Is this something you can do for him? Thanks

## 2024-07-29 DIAGNOSIS — E11.69 TYPE 2 DIABETES MELLITUS WITH MORBID OBESITY: ICD-10-CM

## 2024-07-29 DIAGNOSIS — E66.01 TYPE 2 DIABETES MELLITUS WITH MORBID OBESITY: ICD-10-CM

## 2024-07-29 RX ORDER — DAPAGLIFLOZIN 10 MG/1
1 TABLET, FILM COATED ORAL DAILY
Qty: 90 TABLET | Refills: 0 | Status: SHIPPED | OUTPATIENT
Start: 2024-07-29

## 2024-08-01 ENCOUNTER — OFFICE VISIT (OUTPATIENT)
Dept: INTERNAL MEDICINE | Facility: CLINIC | Age: 49
End: 2024-08-01
Payer: COMMERCIAL

## 2024-08-01 VITALS
WEIGHT: 277 LBS | TEMPERATURE: 98.2 F | DIASTOLIC BLOOD PRESSURE: 82 MMHG | SYSTOLIC BLOOD PRESSURE: 112 MMHG | BODY MASS INDEX: 41.03 KG/M2 | HEART RATE: 80 BPM | HEIGHT: 69 IN

## 2024-08-01 DIAGNOSIS — L05.01 SACROCOCCYGEAL PILONIDAL CYST WITH ABSCESS: ICD-10-CM

## 2024-08-01 DIAGNOSIS — E11.65 TYPE 2 DIABETES MELLITUS WITH HYPERGLYCEMIA, WITHOUT LONG-TERM CURRENT USE OF INSULIN: Primary | ICD-10-CM

## 2024-08-01 DIAGNOSIS — E66.01 MORBID OBESITY DUE TO EXCESS CALORIES: ICD-10-CM

## 2024-08-01 PROCEDURE — 99214 OFFICE O/P EST MOD 30 MIN: CPT | Performed by: INTERNAL MEDICINE

## 2024-08-01 RX ORDER — SEMAGLUTIDE 1.7 MG/.75ML
1.7 INJECTION, SOLUTION SUBCUTANEOUS WEEKLY
Qty: 0.75 ML | Refills: 2 | Status: SHIPPED | OUTPATIENT
Start: 2024-08-01

## 2024-08-01 NOTE — PROGRESS NOTES
Chief Complaint   Patient presents with    Diabetes    Obesity       History of Present Illness  49 y.o. male presents for follow up of his weight and rectal issues and blood sugar.    Review of Systems   Gastrointestinal:  Positive for nausea.        Rectal pain    Psychiatric/Behavioral:  Negative for dysphoric mood.      .    PMSFH:  The following portions of the patient's history were reviewed and updated as appropriate: allergies, current medications, past family history, past medical history, past social history, past surgical history and problem list.      Current Outpatient Medications:     Cholecalciferol (Vitamin D3) 50 MCG (2000 UT) capsule, Take 1 capsule by mouth Daily., Disp: 30 capsule, Rfl: 11    Cyanocobalamin (B-12) 1000 MCG tablet, Take  by mouth. Once daily, Disp: , Rfl:     dapagliflozin Propanediol 10 MG tablet, Take 1 tablet by mouth once daily, Disp: 90 tablet, Rfl: 0    fluticasone (FLONASE) 50 MCG/ACT nasal spray, USE 2 SPRAY(S) IN EACH NOSTRIL ONCE DAILY AS DIRECTED BY PROVIDER, Disp: 16 g, Rfl: 0    lisinopril (PRINIVIL,ZESTRIL) 20 MG tablet, Take 1 tablet by mouth once daily, Disp: 90 tablet, Rfl: 0    metoprolol succinate XL (TOPROL-XL) 100 MG 24 hr tablet, Take one table the night before or morning of CT scan, Disp: 1 tablet, Rfl: 0    omeprazole (priLOSEC) 20 MG capsule, Take 1 capsule by mouth once daily, Disp: 90 capsule, Rfl: 0    potassium chloride (KLOR-CON M10) 10 MEQ CR tablet, Take 1 tablet by mouth 2 (Two) Times a Day., Disp: 90 tablet, Rfl: 1    Potassium Citrate ER (Urocit-K 15) 15 MEQ (1620 MG) tablet controlled-release, Take 15 mEq by mouth Every 12 (Twelve) Hours., Disp: 180 tablet, Rfl: 1    Psyllium (Konsyl-D) 52.3 % powder, Take with breakfast and supper, Disp: , Rfl:     rosuvastatin (Crestor) 10 MG tablet, Take 1 tablet by mouth Daily., Disp: 90 tablet, Rfl: 1    tamsulosin (FLOMAX) 0.4 MG capsule 24 hr capsule, Take 1 capsule by mouth once daily, Disp: 90 capsule,  "Rfl: 0    venlafaxine XR (EFFEXOR-XR) 75 MG 24 hr capsule, TAKE 1 CAPSULE BY MOUTH ONCE DAILY WITH FOOD, Disp: 90 capsule, Rfl: 0    Semaglutide-Weight Management (Wegovy) 1.7 MG/0.75ML solution auto-injector, Inject 0.75 mL under the skin into the appropriate area as directed 1 (One) Time Per Week., Disp: 0.75 mL, Rfl: 2    VITALS:  /82   Pulse 80   Temp 98.2 °F (36.8 °C)   Ht 175.3 cm (69.02\")   Wt 126 kg (277 lb)   BMI 40.89 kg/m²     Physical Exam  Vitals and nursing note reviewed.   Constitutional:       Appearance: Normal appearance. He is well-developed.   HENT:      Head: Normocephalic.   Eyes:      Extraocular Movements: Extraocular movements intact.      Conjunctiva/sclera: Conjunctivae normal.   Cardiovascular:      Rate and Rhythm: Normal rate and regular rhythm.   Pulmonary:      Effort: Pulmonary effort is normal. No respiratory distress.      Breath sounds: Normal breath sounds.   Abdominal:      General: Bowel sounds are normal.      Palpations: Abdomen is soft.      Tenderness: There is no abdominal tenderness.      Comments: obese   Skin:     General: Skin is warm and dry.   Neurological:      General: No focal deficit present.      Mental Status: He is alert and oriented to person, place, and time.   Psychiatric:         Mood and Affect: Mood normal.         Behavior: Behavior normal.         LABS:    CMP:  Lab Results   Component Value Date    BUN 7 05/20/2024    CREATININE 1.01 05/20/2024    EGFRIFNONA 90 02/25/2022     05/20/2024    K 3.8 05/20/2024     05/20/2024    CALCIUM 9.6 05/20/2024    ALBUMIN 4.0 05/01/2024    BILITOT 0.5 05/01/2024    ALKPHOS 65 05/01/2024    AST 31 05/01/2024    ALT 30 05/01/2024     CBC:  Lab Results   Component Value Date    WBC 10.45 05/01/2024    RBC 5.26 05/01/2024    HGB 13.8 05/01/2024    HCT 42.8 05/01/2024    MCV 81.4 05/01/2024    MCH 26.2 (L) 05/01/2024    MCHC 32.2 05/01/2024    RDW 14.3 05/01/2024     05/01/2024     LIPID " PANEL:  Lab Results   Component Value Date    TRIG 98 05/01/2024    HDL 41 05/01/2024    VLDL 18 05/01/2024    LDL 93 05/01/2024    LDLHDL 2.23 05/01/2024     HGBA1C (LAST 3):  Lab Results   Component Value Date    HGBA1C 5.80 (H) 05/01/2024    HGBA1C 5.70 (H) 10/11/2023    HGBA1C 6.10 (H) 03/14/2023     Procedures         ASSESSMENT/PLAN:  Diagnoses and all orders for this visit:    1. Type 2 diabetes mellitus with hyperglycemia, without long-term current use of insulin (Primary)  Assessment & Plan:   Increase ozempic from 1mg to 1.7 mg       2. Morbid obesity due to excess calories  Assessment & Plan:  Patient's (Body mass index is 40.89 kg/m².) indicates that they are morbidly/severely obese (BMI > 40 or > 35 with obesity - related health condition) with health conditions that include hypertension, diabetes mellitus, dyslipidemias, GERD, and osteoarthritis . Weight is improving with treatment. BMI  is above average; BMI management plan is completed. We discussed portion control and increasing exercise.       3. Sacrococcygeal pilonidal cyst with abscess  Assessment & Plan:  He has had the procedure and getting adjusted per Dr Danielle       Other orders  -     Semaglutide-Weight Management (Wegovy) 1.7 MG/0.75ML solution auto-injector; Inject 0.75 mL under the skin into the appropriate area as directed 1 (One) Time Per Week.  Dispense: 0.75 mL; Refill: 2        FOLLOW-UP:  Return in about 3 months (around 11/1/2024) for Recheck.      Electronically signed by:    Austyn Carl MD

## 2024-08-02 NOTE — ASSESSMENT & PLAN NOTE
Patient's (Body mass index is 40.89 kg/m².) indicates that they are morbidly/severely obese (BMI > 40 or > 35 with obesity - related health condition) with health conditions that include hypertension, diabetes mellitus, dyslipidemias, GERD, and osteoarthritis . Weight is improving with treatment. BMI  is above average; BMI management plan is completed. We discussed portion control and increasing exercise.

## 2024-08-26 RX ORDER — POTASSIUM CHLORIDE 750 MG/1
TABLET, EXTENDED RELEASE ORAL 2 TIMES DAILY
Qty: 90 TABLET | Refills: 0 | Status: SHIPPED | OUTPATIENT
Start: 2024-08-26

## 2024-09-03 ENCOUNTER — TELEPHONE (OUTPATIENT)
Dept: INTERNAL MEDICINE | Facility: CLINIC | Age: 49
End: 2024-09-03

## 2024-09-03 NOTE — TELEPHONE ENCOUNTER
Caller: Sumanth Alfaro    Relationship: Self    Best call back number:[phone]    What is the best time to reach you: ANYTIME     Who are you requesting to speak with (clinical staff, provider,  specific staff member): CLINICAL STAFF      PATIENT STATED HE CALLED HIS INSURANCE AND THE CURRENT PRIOR AUTHORIZATION HAS  A NEW ONE IS NEEDING TO BE SENT IN. [phone] IS THE NUMBER TO PATIENTS INSURANCE.

## 2024-09-03 NOTE — TELEPHONE ENCOUNTER
Caller: Sumanht Alfaro    Relationship: Self    Best call back number: [phone]    Which medication are you concerned about: WEGOVY    Who prescribed you this medication: DR FINLEY    What are your concerns: PHARMACY STATES THEY NEED A PRIOR AUTHORIZATION FOR MEDICATION. PLEASE ADVISE

## 2024-09-04 ENCOUNTER — TELEPHONE (OUTPATIENT)
Dept: INTERNAL MEDICINE | Facility: CLINIC | Age: 49
End: 2024-09-04
Payer: COMMERCIAL

## 2024-09-04 NOTE — TELEPHONE ENCOUNTER
Caller: Sumanth Alfaro    Relationship: Self    Best call back number:  [phone]    Which medication are you concerned about: Semaglutide-Weight Management (Wegovy) 1.7 MG/0.75ML solution auto-injector     Who prescribed you this medication: DR FINLEY    When did you start taking this medication: ONGOING    What are your concerns: PATIENT HAS SEVERAL CONCERNS ABOUT GETTING THIS MEDICATION, AUTHORIZATIONS, AND THE ISSUES GETTING IT FROM THE PHARMACY    PLEASE CALL TO ADVISE

## 2024-09-04 NOTE — TELEPHONE ENCOUNTER
I called the patient.  He reached out to his insurance and was told the PA had  and we needed to call for another one.   The number he provided was [phone].

## 2024-09-04 NOTE — TELEPHONE ENCOUNTER
Spoke with pharmacy and notified them PA was approved through Cover My Meds 2 days ago. Pharmacist stated he was going to call pt.

## 2024-09-16 DIAGNOSIS — E66.01 TYPE 2 DIABETES MELLITUS WITH MORBID OBESITY: ICD-10-CM

## 2024-09-16 DIAGNOSIS — E11.69 TYPE 2 DIABETES MELLITUS WITH MORBID OBESITY: ICD-10-CM

## 2024-09-16 RX ORDER — DAPAGLIFLOZIN 10 MG/1
1 TABLET, FILM COATED ORAL DAILY
Qty: 90 TABLET | Refills: 0 | Status: SHIPPED | OUTPATIENT
Start: 2024-09-16

## 2024-09-17 ENCOUNTER — PATIENT MESSAGE (OUTPATIENT)
Dept: INTERNAL MEDICINE | Facility: CLINIC | Age: 49
End: 2024-09-17
Payer: COMMERCIAL

## 2024-09-24 RX ORDER — FLUTICASONE PROPIONATE 50 UG/1
2 SPRAY, METERED NASAL DAILY
Qty: 16 G | Refills: 0 | Status: SHIPPED | OUTPATIENT
Start: 2024-09-24

## 2024-10-09 RX ORDER — POTASSIUM CHLORIDE 750 MG/1
TABLET, EXTENDED RELEASE ORAL 2 TIMES DAILY
Qty: 180 TABLET | Refills: 0 | Status: SHIPPED | OUTPATIENT
Start: 2024-10-09

## 2024-10-22 RX ORDER — SEMAGLUTIDE 1.7 MG/.75ML
INJECTION, SOLUTION SUBCUTANEOUS
Qty: 4 ML | Refills: 2 | Status: SHIPPED | OUTPATIENT
Start: 2024-10-22

## 2024-10-22 NOTE — TELEPHONE ENCOUNTER
Rx Refill Note  Requested Prescriptions     Pending Prescriptions Disp Refills    Wegovy 1.7 MG/0.75ML solution auto-injector [Pharmacy Med Name: Wegovy 1.7 MG/0.75ML Subcutaneous Solution Auto-injector] 4 mL 0     Sig: INJECT 0.75ML UNDER THE SKIN INTO THE APPROPRIATE AREA AS DIRECTED ONCE A WEEK      Last office visit with prescribing clinician: 8/1/2024   Last telemedicine visit with prescribing clinician: Visit date not found   Next office visit with prescribing clinician: 11/1/2024                         Would you like a call back once the refill request has been completed: [] Yes [] No    If the office needs to give you a call back, can they leave a voicemail: [] Yes [] No    Nakia Moreira RN  10/22/24, 11:15 EDT

## 2024-11-01 ENCOUNTER — LAB (OUTPATIENT)
Dept: LAB | Facility: HOSPITAL | Age: 49
End: 2024-11-01
Payer: COMMERCIAL

## 2024-11-01 ENCOUNTER — OFFICE VISIT (OUTPATIENT)
Dept: INTERNAL MEDICINE | Facility: CLINIC | Age: 49
End: 2024-11-01
Payer: COMMERCIAL

## 2024-11-01 VITALS
TEMPERATURE: 96.8 F | WEIGHT: 271 LBS | DIASTOLIC BLOOD PRESSURE: 76 MMHG | BODY MASS INDEX: 40.14 KG/M2 | SYSTOLIC BLOOD PRESSURE: 118 MMHG | HEIGHT: 69 IN | HEART RATE: 76 BPM

## 2024-11-01 DIAGNOSIS — E78.5 HYPERLIPIDEMIA LDL GOAL <70: ICD-10-CM

## 2024-11-01 DIAGNOSIS — E11.69 TYPE 2 DIABETES MELLITUS WITH MORBID OBESITY: Primary | ICD-10-CM

## 2024-11-01 DIAGNOSIS — E66.01 TYPE 2 DIABETES MELLITUS WITH MORBID OBESITY: ICD-10-CM

## 2024-11-01 DIAGNOSIS — E11.69 TYPE 2 DIABETES MELLITUS WITH MORBID OBESITY: ICD-10-CM

## 2024-11-01 DIAGNOSIS — E66.01 MORBID OBESITY DUE TO EXCESS CALORIES: ICD-10-CM

## 2024-11-01 DIAGNOSIS — F33.0 MILD EPISODE OF RECURRENT MAJOR DEPRESSIVE DISORDER: ICD-10-CM

## 2024-11-01 DIAGNOSIS — E66.01 TYPE 2 DIABETES MELLITUS WITH MORBID OBESITY: Primary | ICD-10-CM

## 2024-11-01 LAB
ALBUMIN SERPL-MCNC: 4 G/DL (ref 3.5–5.2)
ALBUMIN UR-MCNC: <1.2 MG/DL
ALBUMIN/GLOB SERPL: 1.2 G/DL
ALP SERPL-CCNC: 77 U/L (ref 39–117)
ALT SERPL W P-5'-P-CCNC: 25 U/L (ref 1–41)
ANION GAP SERPL CALCULATED.3IONS-SCNC: 10 MMOL/L (ref 5–15)
AST SERPL-CCNC: 22 U/L (ref 1–40)
BASOPHILS # BLD AUTO: 0.07 10*3/MM3 (ref 0–0.2)
BASOPHILS NFR BLD AUTO: 0.7 % (ref 0–1.5)
BILIRUB SERPL-MCNC: 0.7 MG/DL (ref 0–1.2)
BUN SERPL-MCNC: 7 MG/DL (ref 6–20)
BUN/CREAT SERPL: 7.8 (ref 7–25)
CALCIUM SPEC-SCNC: 9.2 MG/DL (ref 8.6–10.5)
CHLORIDE SERPL-SCNC: 101 MMOL/L (ref 98–107)
CO2 SERPL-SCNC: 26 MMOL/L (ref 22–29)
CREAT SERPL-MCNC: 0.9 MG/DL (ref 0.76–1.27)
CREAT UR-MCNC: 236.2 MG/DL
DEPRECATED RDW RBC AUTO: 41.7 FL (ref 37–54)
EGFRCR SERPLBLD CKD-EPI 2021: 103.4 ML/MIN/1.73
EOSINOPHIL # BLD AUTO: 0.24 10*3/MM3 (ref 0–0.4)
EOSINOPHIL NFR BLD AUTO: 2.5 % (ref 0.3–6.2)
ERYTHROCYTE [DISTWIDTH] IN BLOOD BY AUTOMATED COUNT: 14.2 % (ref 12.3–15.4)
GLOBULIN UR ELPH-MCNC: 3.3 GM/DL
GLUCOSE SERPL-MCNC: 89 MG/DL (ref 65–99)
HBA1C MFR BLD: 5.6 % (ref 4.8–5.6)
HCT VFR BLD AUTO: 43.1 % (ref 37.5–51)
HGB BLD-MCNC: 14.5 G/DL (ref 13–17.7)
IMM GRANULOCYTES # BLD AUTO: 0.05 10*3/MM3 (ref 0–0.05)
IMM GRANULOCYTES NFR BLD AUTO: 0.5 % (ref 0–0.5)
LYMPHOCYTES # BLD AUTO: 3.33 10*3/MM3 (ref 0.7–3.1)
LYMPHOCYTES NFR BLD AUTO: 34 % (ref 19.6–45.3)
MCH RBC QN AUTO: 27.6 PG (ref 26.6–33)
MCHC RBC AUTO-ENTMCNC: 33.6 G/DL (ref 31.5–35.7)
MCV RBC AUTO: 82.1 FL (ref 79–97)
MICROALBUMIN/CREAT UR: NORMAL MG/G{CREAT}
MONOCYTES # BLD AUTO: 0.58 10*3/MM3 (ref 0.1–0.9)
MONOCYTES NFR BLD AUTO: 5.9 % (ref 5–12)
NEUTROPHILS NFR BLD AUTO: 5.51 10*3/MM3 (ref 1.7–7)
NEUTROPHILS NFR BLD AUTO: 56.4 % (ref 42.7–76)
NRBC BLD AUTO-RTO: 0 /100 WBC (ref 0–0.2)
PLATELET # BLD AUTO: 344 10*3/MM3 (ref 140–450)
PMV BLD AUTO: 9 FL (ref 6–12)
POTASSIUM SERPL-SCNC: 3.8 MMOL/L (ref 3.5–5.2)
PROT SERPL-MCNC: 7.3 G/DL (ref 6–8.5)
RBC # BLD AUTO: 5.25 10*6/MM3 (ref 4.14–5.8)
SODIUM SERPL-SCNC: 137 MMOL/L (ref 136–145)
TSH SERPL DL<=0.05 MIU/L-ACNC: 1.91 UIU/ML (ref 0.27–4.2)
WBC NRBC COR # BLD AUTO: 9.78 10*3/MM3 (ref 3.4–10.8)

## 2024-11-01 PROCEDURE — 80050 GENERAL HEALTH PANEL: CPT

## 2024-11-01 PROCEDURE — 83036 HEMOGLOBIN GLYCOSYLATED A1C: CPT

## 2024-11-01 PROCEDURE — 82570 ASSAY OF URINE CREATININE: CPT

## 2024-11-01 PROCEDURE — 82043 UR ALBUMIN QUANTITATIVE: CPT

## 2024-11-01 RX ORDER — SEMAGLUTIDE 2.4 MG/.75ML
2.4 INJECTION, SOLUTION SUBCUTANEOUS WEEKLY
Qty: 0.75 ML | Refills: 3 | Status: SHIPPED | OUTPATIENT
Start: 2024-11-01

## 2024-11-01 NOTE — ASSESSMENT & PLAN NOTE
Patient's depression is a recurrent episode that is mild without psychosis. Depression is in partial remission and stable.    Plan:   Continue current medication therapy     Followup in 6 months.

## 2024-11-01 NOTE — ASSESSMENT & PLAN NOTE
Patient's (Body mass index is 40 kg/m².) indicates that they are morbidly/severely obese (BMI > 40 or > 35 with obesity - related health condition) with health conditions that include hypertension, diabetes mellitus, dyslipidemias, GERD, and osteoarthritis . Weight is improving with treatment. BMI  is above average; BMI management plan is completed. We discussed portion control and increasing exercise.

## 2024-11-01 NOTE — ASSESSMENT & PLAN NOTE
Patient's (Body mass index is 40 kg/m².) indicates that they are morbidly/severely obese (BMI > 40 or > 35 with obesity - related health condition) with health conditions that include hypertension, diabetes mellitus, dyslipidemias, and osteoarthritis . Weight is improving with treatment. BMI  is above average; BMI management plan is completed. We discussed portion control and increasing exercise.

## 2024-11-01 NOTE — PROGRESS NOTES
Chief Complaint   Patient presents with    Diabetes       History of Present Illness  49 y.o. male presents for follow up on blood sugar and weight    Review of Systems   Constitutional:  Negative for chills and fever.   Respiratory:  Negative for cough and shortness of breath.    Cardiovascular:  Negative for chest pain and palpitations.   Gastrointestinal:  Negative for abdominal pain, nausea and vomiting.   Skin:  Negative for rash.   Neurological:  Negative for dizziness, light-headedness and headaches.   Psychiatric/Behavioral:  Negative for decreased concentration and dysphoric mood.    All other systems reviewed and are negative.    .    PMSFH:  The following portions of the patient's history were reviewed and updated as appropriate: allergies, current medications, past family history, past medical history, past social history, past surgical history and problem list.      Current Outpatient Medications:     Cholecalciferol (Vitamin D3) 50 MCG (2000 UT) capsule, Take 1 capsule by mouth Daily., Disp: 30 capsule, Rfl: 11    Cyanocobalamin (B-12) 1000 MCG tablet, Take  by mouth. Once daily, Disp: , Rfl:     Farxiga 10 MG tablet, Take 1 tablet by mouth once daily, Disp: 90 tablet, Rfl: 0    fluticasone (FLONASE) 50 MCG/ACT nasal spray, Use 2 spray(s) in each nostril once daily, Disp: 16 g, Rfl: 0    lisinopril (PRINIVIL,ZESTRIL) 20 MG tablet, Take 1 tablet by mouth once daily, Disp: 90 tablet, Rfl: 0    omeprazole (priLOSEC) 20 MG capsule, Take 1 capsule by mouth once daily, Disp: 90 capsule, Rfl: 0    potassium chloride (KLOR-CON M10) 10 MEQ CR tablet, Take 1 tablet by mouth twice daily, Disp: 180 tablet, Rfl: 0    Potassium Citrate ER (Urocit-K 15) 15 MEQ (1620 MG) tablet controlled-release, Take 15 mEq by mouth Every 12 (Twelve) Hours., Disp: 180 tablet, Rfl: 1    Psyllium (Konsyl-D) 52.3 % powder, Take with breakfast and supper, Disp: , Rfl:     rosuvastatin (Crestor) 10 MG tablet, Take 1 tablet by mouth  "Daily., Disp: 90 tablet, Rfl: 1    tamsulosin (FLOMAX) 0.4 MG capsule 24 hr capsule, Take 1 capsule by mouth once daily, Disp: 90 capsule, Rfl: 0    venlafaxine XR (EFFEXOR-XR) 75 MG 24 hr capsule, TAKE 1 CAPSULE BY MOUTH ONCE DAILY WITH FOOD, Disp: 90 capsule, Rfl: 0    Semaglutide-Weight Management (Wegovy) 2.4 MG/0.75ML solution auto-injector, Inject 2.4 mg under the skin into the appropriate area as directed 1 (One) Time Per Week., Disp: 0.75 mL, Rfl: 3    VITALS:  /76   Pulse 76   Temp 96.8 °F (36 °C)   Ht 175.3 cm (69.02\")   Wt 123 kg (271 lb)   BMI 40.00 kg/m²     Physical Exam  Vitals and nursing note reviewed.   Constitutional:       Appearance: Normal appearance. He is well-developed.   HENT:      Head: Normocephalic.   Eyes:      Extraocular Movements: Extraocular movements intact.      Conjunctiva/sclera: Conjunctivae normal.   Cardiovascular:      Rate and Rhythm: Normal rate and regular rhythm.      Heart sounds: Normal heart sounds.   Pulmonary:      Effort: Pulmonary effort is normal. No respiratory distress.      Breath sounds: Normal breath sounds.   Abdominal:      General: Bowel sounds are normal.      Palpations: Abdomen is soft.      Tenderness: There is no abdominal tenderness.      Comments: Obese    Skin:     General: Skin is warm and dry.   Neurological:      General: No focal deficit present.      Mental Status: He is alert and oriented to person, place, and time.   Psychiatric:         Mood and Affect: Mood normal.         Behavior: Behavior normal.         LABS:    CMP:  Lab Results   Component Value Date    BUN 7 05/20/2024    CREATININE 1.01 05/20/2024    EGFRIFNONA 90 02/25/2022     05/20/2024    K 3.8 05/20/2024     05/20/2024    CALCIUM 9.6 05/20/2024    ALBUMIN 4.0 05/01/2024    BILITOT 0.5 05/01/2024    ALKPHOS 65 05/01/2024    AST 31 05/01/2024    ALT 30 05/01/2024     CBC:  Lab Results   Component Value Date    WBC 10.45 05/01/2024    RBC 5.26 05/01/2024 "    HGB 13.8 05/01/2024    HCT 42.8 05/01/2024    MCV 81.4 05/01/2024    MCH 26.2 (L) 05/01/2024    MCHC 32.2 05/01/2024    RDW 14.3 05/01/2024     05/01/2024     LIPID PANEL:  Lab Results   Component Value Date    TRIG 98 05/01/2024    HDL 41 05/01/2024    VLDL 18 05/01/2024    LDL 93 05/01/2024    LDLHDL 2.23 05/01/2024     HGBA1C (LAST 3):  Lab Results   Component Value Date    HGBA1C 5.80 (H) 05/01/2024    HGBA1C 5.70 (H) 10/11/2023    HGBA1C 6.10 (H) 03/14/2023     MICROALBUMIN SPOT URINE:  Lab Results   Component Value Date    MICROALBUR <1.2 05/01/2024     Procedures         ASSESSMENT/PLAN:  Diagnoses and all orders for this visit:    1. Type 2 diabetes mellitus with morbid obesity (Primary)  Assessment & Plan:  Patient's (Body mass index is 40 kg/m².) indicates that they are morbidly/severely obese (BMI > 40 or > 35 with obesity - related health condition) with health conditions that include hypertension, diabetes mellitus, dyslipidemias, and osteoarthritis . Weight is improving with treatment. BMI  is above average; BMI management plan is completed. We discussed portion control and increasing exercise.     Orders:  -     Hemoglobin A1c; Future  -     Microalbumin / Creatinine Urine Ratio - Urine, Clean Catch; Future  -     Semaglutide-Weight Management (Wegovy) 2.4 MG/0.75ML solution auto-injector; Inject 2.4 mg under the skin into the appropriate area as directed 1 (One) Time Per Week.  Dispense: 0.75 mL; Refill: 3    2. Mild episode of recurrent major depressive disorder  Assessment & Plan:  Patient's depression is a recurrent episode that is mild without psychosis. Depression is in partial remission and stable.    Plan:   Continue current medication therapy     Followup in 6 months.       3. Morbid obesity due to excess calories  Assessment & Plan:  Patient's (Body mass index is 40 kg/m².) indicates that they are morbidly/severely obese (BMI > 40 or > 35 with obesity - related health condition)  with health conditions that include hypertension, diabetes mellitus, dyslipidemias, GERD, and osteoarthritis . Weight is improving with treatment. BMI  is above average; BMI management plan is completed. We discussed portion control and increasing exercise.     Orders:  -     Semaglutide-Weight Management (Wegovy) 2.4 MG/0.75ML solution auto-injector; Inject 2.4 mg under the skin into the appropriate area as directed 1 (One) Time Per Week.  Dispense: 0.75 mL; Refill: 3    4. Hyperlipidemia LDL goal <70  Assessment & Plan:   Follow labs    Orders:  -     CBC & Differential; Future  -     Comprehensive Metabolic Panel; Future  -     Cancel: Lipid Panel; Future  -     TSH Rfx On Abnormal To Free T4; Future        FOLLOW-UP:  Return in about 3 months (around 2/1/2025) for Recheck.      Electronically signed by:    Austyn Carl MD

## 2024-12-02 NOTE — TELEPHONE ENCOUNTER
Rx Refill Note  Requested Prescriptions     Pending Prescriptions Disp Refills    omeprazole (priLOSEC) 20 MG capsule [Pharmacy Med Name: Omeprazole 20 MG Oral Capsule Delayed Release] 90 capsule 0     Sig: Take 1 capsule by mouth once daily      Last office visit with prescribing clinician: 11/1/2024   Last telemedicine visit with prescribing clinician: Visit date not found   Next office visit with prescribing clinician: 3/4/2025                         Would you like a call back once the refill request has been completed: [] Yes [] No    If the office needs to give you a call back, can they leave a voicemail: [] Yes [] No    Jerri Wood MA  12/02/24, 09:26 EST

## 2024-12-09 RX ORDER — LISINOPRIL 20 MG/1
TABLET ORAL
Qty: 90 TABLET | Refills: 0 | Status: SHIPPED | OUTPATIENT
Start: 2024-12-09

## 2024-12-09 RX ORDER — VENLAFAXINE HYDROCHLORIDE 75 MG/1
CAPSULE, EXTENDED RELEASE ORAL
Qty: 90 CAPSULE | Refills: 0 | Status: SHIPPED | OUTPATIENT
Start: 2024-12-09

## 2024-12-12 RX ORDER — FLUTICASONE PROPIONATE 50 MCG
2 SPRAY, SUSPENSION (ML) NASAL DAILY
Qty: 16 G | Refills: 5 | Status: SHIPPED | OUTPATIENT
Start: 2024-12-12

## 2024-12-12 NOTE — TELEPHONE ENCOUNTER
Rx Refill Note  Requested Prescriptions     Pending Prescriptions Disp Refills    fluticasone (FLONASE) 50 MCG/ACT nasal spray [Pharmacy Med Name: Fluticasone Propionate 50 MCG/ACT Nasal Suspension] 16 g 0     Sig: Use 2 spray(s) in each nostril once daily      Last office visit with prescribing clinician: 11/1/2024   Last telemedicine visit with prescribing clinician: Visit date not found   Next office visit with prescribing clinician: 3/4/2025                         Would you like a call back once the refill request has been completed: [] Yes [] No    If the office needs to give you a call back, can they leave a voicemail: [] Yes [] No    Guerline Monae LPN  12/12/24, 14:07 EST

## 2025-01-01 ENCOUNTER — PATIENT ROUNDING (BHMG ONLY) (OUTPATIENT)
Dept: URGENT CARE | Facility: CLINIC | Age: 50
End: 2025-01-01
Payer: COMMERCIAL

## 2025-01-08 RX ORDER — POTASSIUM CHLORIDE 750 MG/1
TABLET, EXTENDED RELEASE ORAL 2 TIMES DAILY
Qty: 180 TABLET | Refills: 0 | Status: SHIPPED | OUTPATIENT
Start: 2025-01-08

## 2025-01-08 NOTE — TELEPHONE ENCOUNTER
Rx Refill Note  Requested Prescriptions     Pending Prescriptions Disp Refills    potassium chloride (KLOR-CON M10) 10 MEQ CR tablet [Pharmacy Med Name: Potassium Chloride [place] ER 10 MEQ Oral Tablet Extended Release] 180 tablet 0     Sig: Take 1 tablet by mouth twice daily      Last office visit with prescribing clinician: 11/1/2024   Last telemedicine visit with prescribing clinician: Visit date not found   Next office visit with prescribing clinician: 3/4/2025                         Would you like a call back once the refill request has been completed: [] Yes [] No    If the office needs to give you a call back, can they leave a voicemail: [] Yes [] No    Jerri Wood MA  01/08/25, 08:38 EST

## 2025-02-03 ENCOUNTER — TELEPHONE (OUTPATIENT)
Dept: INTERNAL MEDICINE | Facility: CLINIC | Age: 50
End: 2025-02-03
Payer: COMMERCIAL

## 2025-02-03 NOTE — TELEPHONE ENCOUNTER
Advised patient via my chart that the insurance company denied the prior authorization for the medication dapagliflozin 10 mg tablet. However I started an appeal to see if I can get this decision overturned . I also advised patient that it can take between 24-72 hrs for a response back from the insurance company but I will let him know once I do.

## 2025-02-14 DIAGNOSIS — E11.69 TYPE 2 DIABETES MELLITUS WITH MORBID OBESITY: ICD-10-CM

## 2025-02-14 DIAGNOSIS — E66.01 MORBID OBESITY DUE TO EXCESS CALORIES: ICD-10-CM

## 2025-02-14 DIAGNOSIS — E66.01 TYPE 2 DIABETES MELLITUS WITH MORBID OBESITY: ICD-10-CM

## 2025-02-14 RX ORDER — SEMAGLUTIDE 2.4 MG/.75ML
INJECTION, SOLUTION SUBCUTANEOUS
Qty: 4 ML | Refills: 0 | Status: SHIPPED | OUTPATIENT
Start: 2025-02-14

## 2025-02-24 RX ORDER — OMEPRAZOLE 20 MG/1
20 CAPSULE, DELAYED RELEASE ORAL DAILY
Qty: 90 CAPSULE | Refills: 0 | Status: SHIPPED | OUTPATIENT
Start: 2025-02-24

## 2025-03-04 ENCOUNTER — OFFICE VISIT (OUTPATIENT)
Dept: INTERNAL MEDICINE | Facility: CLINIC | Age: 50
End: 2025-03-04
Payer: COMMERCIAL

## 2025-03-04 VITALS
WEIGHT: 262 LBS | TEMPERATURE: 98.1 F | HEART RATE: 98 BPM | HEIGHT: 69 IN | BODY MASS INDEX: 38.8 KG/M2 | SYSTOLIC BLOOD PRESSURE: 122 MMHG | DIASTOLIC BLOOD PRESSURE: 80 MMHG

## 2025-03-04 DIAGNOSIS — E11.65 TYPE 2 DIABETES MELLITUS WITH HYPERGLYCEMIA, WITHOUT LONG-TERM CURRENT USE OF INSULIN: Primary | ICD-10-CM

## 2025-03-04 DIAGNOSIS — R14.0 BLOATING: ICD-10-CM

## 2025-03-04 DIAGNOSIS — E66.01 MORBID OBESITY DUE TO EXCESS CALORIES: ICD-10-CM

## 2025-03-04 DIAGNOSIS — E11.69 TYPE 2 DIABETES MELLITUS WITH MORBID OBESITY: ICD-10-CM

## 2025-03-04 DIAGNOSIS — R19.7 DIARRHEA, UNSPECIFIED TYPE: ICD-10-CM

## 2025-03-04 DIAGNOSIS — E66.01 TYPE 2 DIABETES MELLITUS WITH MORBID OBESITY: ICD-10-CM

## 2025-03-04 RX ORDER — FLUTICASONE PROPIONATE 50 MCG
2 SPRAY, SUSPENSION (ML) NASAL DAILY
Qty: 16 G | Refills: 5 | Status: SHIPPED | OUTPATIENT
Start: 2025-03-04

## 2025-03-04 NOTE — ASSESSMENT & PLAN NOTE
Patient's (Body mass index is 38.67 kg/m².) indicates that they are morbidly/severely obese (BMI > 40 or > 35 with obesity - related health condition) with health conditions that include hypertension, diabetes mellitus, dyslipidemias, GERD, and osteoarthritis . Weight is improving with treatment. BMI  is above average; BMI management plan is completed. We discussed portion control and increasing exercise.

## 2025-03-04 NOTE — ASSESSMENT & PLAN NOTE
Patient's (Body mass index is 38.67 kg/m².) indicates that they are morbidly/severely obese (BMI > 40 or > 35 with obesity - related health condition) with health conditions that include hypertension, diabetes mellitus, dyslipidemias, GERD, and osteoarthritis . Weight is unchanged. BMI  is above average; BMI management plan is completed. We discussed portion control and increasing exercise.  Reduce bad carbs and increase exercise and keep on wegovy and jardiance

## 2025-03-04 NOTE — PROGRESS NOTES
Chief Complaint   Patient presents with    Diarrhea     Started yesterday       Weight Management  Weight:  Decreased  Weight change (lbs):  5  Weight loss treatment:  Portion control and prescription medications  Treatment barriers:  Adherence to diet  Physical activity tolerance:  Improved  Energy level:  Unchanged    49 y.o. male presents for follow up of weight and some loose stools and mild bloating since yesterday     Review of Systems   Constitutional:  Negative for chills and fever.   Respiratory:  Negative for cough and shortness of breath.    Cardiovascular:  Negative for chest pain and palpitations.   Gastrointestinal:  Positive for abdominal distention, constipation and diarrhea. Negative for abdominal pain, blood in stool, nausea and vomiting.   Skin:  Negative for rash.   Neurological:  Negative for dizziness, light-headedness and headaches.   Psychiatric/Behavioral:  Negative for decreased concentration and dysphoric mood.    All other systems reviewed and are negative.    .    PMSFH:  The following portions of the patient's history were reviewed and updated as appropriate: allergies, current medications, past family history, past medical history, past social history, past surgical history and problem list.      Current Outpatient Medications:     Cholecalciferol (Vitamin D3) 50 MCG (2000 UT) capsule, Take 1 capsule by mouth Daily., Disp: 30 capsule, Rfl: 11    Cyanocobalamin (B-12) 1000 MCG tablet, Take  by mouth. Once daily, Disp: , Rfl:     Farxiga 10 MG tablet, Take 1 tablet by mouth once daily, Disp: 90 tablet, Rfl: 0    fluticasone (FLONASE) 50 MCG/ACT nasal spray, Administer 2 sprays into the nostril(s) as directed by provider Daily., Disp: 16 g, Rfl: 5    lisinopril (PRINIVIL,ZESTRIL) 20 MG tablet, Take 1 tablet by mouth once daily, Disp: 90 tablet, Rfl: 0    omeprazole (priLOSEC) 20 MG capsule, Take 1 capsule by mouth once daily, Disp: 90 capsule, Rfl: 0    potassium chloride (KLOR-CON M10)  "10 MEQ CR tablet, Take 1 tablet by mouth twice daily, Disp: 180 tablet, Rfl: 0    Potassium Citrate ER (Urocit-K 15) 15 MEQ (1620 MG) tablet controlled-release, Take 15 mEq by mouth Every 12 (Twelve) Hours., Disp: 180 tablet, Rfl: 1    Psyllium (Konsyl-D) 52.3 % powder, Take with breakfast and supper, Disp: , Rfl:     rosuvastatin (Crestor) 10 MG tablet, Take 1 tablet by mouth Daily., Disp: 90 tablet, Rfl: 1    tamsulosin (FLOMAX) 0.4 MG capsule 24 hr capsule, Take 1 capsule by mouth once daily, Disp: 90 capsule, Rfl: 0    venlafaxine XR (EFFEXOR-XR) 75 MG 24 hr capsule, TAKE 1 CAPSULE BY MOUTH ONCE DAILY WITH FOOD, Disp: 90 capsule, Rfl: 0    Wegovy 2.4 MG/0.75ML solution auto-injector, INJECT 2.4MG UNDER THE SKIN INTO THE APPROPRIATE AREA AS DIRTECTED ONCE A WEEK, Disp: 4 mL, Rfl: 0    VITALS:  /80   Pulse 98   Temp 98.1 °F (36.7 °C)   Ht 175.3 cm (69.02\")   Wt 119 kg (262 lb)   BMI 38.67 kg/m²     Physical Exam  HENT:      Head: Normocephalic and atraumatic.   Cardiovascular:      Rate and Rhythm: Normal rate and regular rhythm.   Pulmonary:      Breath sounds: No wheezing or rales.   Abdominal:      General: Bowel sounds are normal.      Palpations: Abdomen is soft.      Tenderness: There is no abdominal tenderness. There is no guarding.      Comments: Obese    Neurological:      General: No focal deficit present.      Mental Status: He is oriented to person, place, and time.   Psychiatric:         Mood and Affect: Mood normal.         Behavior: Behavior normal.         LABS:    CMP:  Lab Results   Component Value Date    BUN 7 11/01/2024    CREATININE 0.90 11/01/2024    EGFRIFNONA 90 02/25/2022     11/01/2024    K 3.8 11/01/2024     11/01/2024    CALCIUM 9.2 11/01/2024    ALBUMIN 4.0 11/01/2024    BILITOT 0.7 11/01/2024    ALKPHOS 77 11/01/2024    AST 22 11/01/2024    ALT 25 11/01/2024     CBC:  Lab Results   Component Value Date    WBC 9.78 11/01/2024    RBC 5.25 11/01/2024    HGB 14.5 " 11/01/2024    HCT 43.1 11/01/2024    MCV 82.1 11/01/2024    MCH 27.6 11/01/2024    MCHC 33.6 11/01/2024    RDW 14.2 11/01/2024     11/01/2024     LIPID PANEL:  Lab Results   Component Value Date    TRIG 98 05/01/2024    HDL 41 05/01/2024    VLDL 18 05/01/2024    LDL 93 05/01/2024    LDLHDL 2.23 05/01/2024     HGBA1C (LAST 3):  Lab Results   Component Value Date    HGBA1C 5.60 11/01/2024    HGBA1C 5.80 (H) 05/01/2024    HGBA1C 5.70 (H) 10/11/2023     Procedures         ASSESSMENT/PLAN:  Diagnoses and all orders for this visit:    1. Type 2 diabetes mellitus with hyperglycemia, without long-term current use of insulin (Primary)  Assessment & Plan:   Use wegovy and jardiance       2. Type 2 diabetes mellitus with morbid obesity  Assessment & Plan:  Patient's (Body mass index is 38.67 kg/m².) indicates that they are morbidly/severely obese (BMI > 40 or > 35 with obesity - related health condition) with health conditions that include hypertension, diabetes mellitus, dyslipidemias, GERD, and osteoarthritis . Weight is unchanged. BMI  is above average; BMI management plan is completed. We discussed portion control and increasing exercise.  Reduce bad carbs and increase exercise and keep on wegovy and jardiance       3. Morbid obesity due to excess calories  Assessment & Plan:  Patient's (Body mass index is 38.67 kg/m².) indicates that they are morbidly/severely obese (BMI > 40 or > 35 with obesity - related health condition) with health conditions that include hypertension, diabetes mellitus, dyslipidemias, GERD, and osteoarthritis . Weight is improving with treatment. BMI  is above average; BMI management plan is completed. We discussed portion control and increasing exercise.       4. Bloating  Comments:  If conintues will return in couple weeks and if worsens return sooner    5. Diarrhea, unspecified type  Comments:  Hold miralax    Other orders  -     fluticasone (FLONASE) 50 MCG/ACT nasal spray; Administer 2  sprays into the nostril(s) as directed by provider Daily.  Dispense: 16 g; Refill: 5        FOLLOW-UP:  Return in about 3 months (around 6/4/2025) for Recheck.      Electronically signed by:    Austyn Carl MD

## 2025-03-05 RX ORDER — VENLAFAXINE HYDROCHLORIDE 75 MG/1
CAPSULE, EXTENDED RELEASE ORAL
Qty: 90 CAPSULE | Refills: 0 | Status: SHIPPED | OUTPATIENT
Start: 2025-03-05

## 2025-03-05 RX ORDER — LISINOPRIL 20 MG/1
TABLET ORAL
Qty: 90 TABLET | Refills: 0 | Status: SHIPPED | OUTPATIENT
Start: 2025-03-05

## 2025-03-09 DIAGNOSIS — E11.69 TYPE 2 DIABETES MELLITUS WITH MORBID OBESITY: ICD-10-CM

## 2025-03-09 DIAGNOSIS — E66.01 TYPE 2 DIABETES MELLITUS WITH MORBID OBESITY: ICD-10-CM

## 2025-03-09 DIAGNOSIS — E66.01 MORBID OBESITY DUE TO EXCESS CALORIES: ICD-10-CM

## 2025-03-10 RX ORDER — SEMAGLUTIDE 2.4 MG/.75ML
INJECTION, SOLUTION SUBCUTANEOUS
Qty: 4 ML | Refills: 0 | Status: SHIPPED | OUTPATIENT
Start: 2025-03-10

## 2025-03-10 NOTE — TELEPHONE ENCOUNTER
Rx Refill Note  Requested Prescriptions     Pending Prescriptions Disp Refills    Wegovy 2.4 MG/0.75ML solution auto-injector [Pharmacy Med Name: Wegovy 2.4 MG/0.75ML Subcutaneous Solution Auto-injector] 4 mL 0     Sig: INJECT 2.4 MG SUBCUTANEOUSLY INTO THE APPROPRIATE AREA AS DIRECTED ONCE A WEEK      Last office visit with prescribing clinician: 3/4/2025   Last telemedicine visit with prescribing clinician: Visit date not found   Next office visit with prescribing clinician: 5/9/2025                         Would you like a call back once the refill request has been completed: [] Yes [] No    If the office needs to give you a call back, can they leave a voicemail: [] Yes [] No    Guerline Monae LPN  03/10/25, 08:31 EDT

## 2025-04-05 DIAGNOSIS — E11.69 TYPE 2 DIABETES MELLITUS WITH MORBID OBESITY: ICD-10-CM

## 2025-04-05 DIAGNOSIS — E66.01 MORBID OBESITY DUE TO EXCESS CALORIES: ICD-10-CM

## 2025-04-05 DIAGNOSIS — E66.01 TYPE 2 DIABETES MELLITUS WITH MORBID OBESITY: ICD-10-CM

## 2025-04-07 ENCOUNTER — TELEPHONE (OUTPATIENT)
Dept: INTERNAL MEDICINE | Facility: CLINIC | Age: 50
End: 2025-04-07
Payer: COMMERCIAL

## 2025-04-07 RX ORDER — POTASSIUM CHLORIDE 750 MG/1
TABLET, EXTENDED RELEASE ORAL 2 TIMES DAILY
Qty: 180 TABLET | Refills: 0 | Status: SHIPPED | OUTPATIENT
Start: 2025-04-07

## 2025-04-07 RX ORDER — SEMAGLUTIDE 2.4 MG/.75ML
INJECTION, SOLUTION SUBCUTANEOUS
Qty: 4 ML | Refills: 0 | Status: SHIPPED | OUTPATIENT
Start: 2025-04-07

## 2025-04-07 NOTE — TELEPHONE ENCOUNTER
Advised patient via my mendez that I have received a prior authorization request from the pharmacy for the medication WEGOVY pen injector . I submitted the auth to the insurance company and once I have a response back I I will let him  know as well.

## 2025-04-09 ENCOUNTER — TELEPHONE (OUTPATIENT)
Dept: INTERNAL MEDICINE | Facility: CLINIC | Age: 50
End: 2025-04-09
Payer: COMMERCIAL

## 2025-04-09 NOTE — TELEPHONE ENCOUNTER
Advised patient via my chart that  that I have received a denial letter from the insurance company regarding the WEGOVY pen injector. . I have started an appeal to try and see if I can get this overturned , however  it will take his insurance company's clinical review department up to 30 days for them to respond with an answer, but once I hear from them I will let him know as well.   A copy of this letter will be scanned into the chart .

## 2025-04-22 ENCOUNTER — PATIENT MESSAGE (OUTPATIENT)
Dept: INTERNAL MEDICINE | Facility: CLINIC | Age: 50
End: 2025-04-22
Payer: COMMERCIAL

## 2025-04-22 DIAGNOSIS — E66.01 MORBID OBESITY DUE TO EXCESS CALORIES: Primary | ICD-10-CM

## 2025-04-29 ENCOUNTER — TELEPHONE (OUTPATIENT)
Dept: INTERNAL MEDICINE | Facility: CLINIC | Age: 50
End: 2025-04-29
Payer: COMMERCIAL

## 2025-04-29 NOTE — TELEPHONE ENCOUNTER
Advised patient via my chart that I have received an approval letter from the insurance company regarding the WEGOVY pen injector. The approval is from 04/01/25 to 04/29/26. A copy of this letter will be scanned into the chart .

## 2025-05-09 ENCOUNTER — OFFICE VISIT (OUTPATIENT)
Dept: INTERNAL MEDICINE | Facility: CLINIC | Age: 50
End: 2025-05-09
Payer: COMMERCIAL

## 2025-05-09 VITALS
WEIGHT: 262 LBS | TEMPERATURE: 97.7 F | HEART RATE: 92 BPM | DIASTOLIC BLOOD PRESSURE: 80 MMHG | HEIGHT: 69 IN | SYSTOLIC BLOOD PRESSURE: 122 MMHG | BODY MASS INDEX: 38.8 KG/M2

## 2025-05-09 DIAGNOSIS — I47.9 TACHYCARDIA, PAROXYSMAL: ICD-10-CM

## 2025-05-09 DIAGNOSIS — E55.9 VITAMIN D DEFICIENCY: ICD-10-CM

## 2025-05-09 DIAGNOSIS — E66.01 MORBID OBESITY DUE TO EXCESS CALORIES: ICD-10-CM

## 2025-05-09 DIAGNOSIS — R94.31 ABNORMAL EKG: ICD-10-CM

## 2025-05-09 DIAGNOSIS — I10 HYPERTENSION, BENIGN: ICD-10-CM

## 2025-05-09 DIAGNOSIS — F32.A DEPRESSIVE DISORDER: ICD-10-CM

## 2025-05-09 DIAGNOSIS — E78.5 HYPERLIPIDEMIA LDL GOAL <70: ICD-10-CM

## 2025-05-09 DIAGNOSIS — E11.69 TYPE 2 DIABETES MELLITUS WITH MORBID OBESITY: ICD-10-CM

## 2025-05-09 DIAGNOSIS — Z12.5 SCREENING PSA (PROSTATE SPECIFIC ANTIGEN): ICD-10-CM

## 2025-05-09 DIAGNOSIS — E66.01 TYPE 2 DIABETES MELLITUS WITH MORBID OBESITY: ICD-10-CM

## 2025-05-09 DIAGNOSIS — Z00.00 PREVENTATIVE HEALTH CARE: Primary | ICD-10-CM

## 2025-05-09 RX ORDER — SEMAGLUTIDE 2.4 MG/.75ML
2.5 INJECTION, SOLUTION SUBCUTANEOUS WEEKLY
COMMUNITY
Start: 2025-05-07

## 2025-05-09 NOTE — ASSESSMENT & PLAN NOTE
His mood is good overall and proceed with labs Get eye exam next week. Age-appropriate Counseling:  Discussed preventative medicine issues with patient including regular exercise, healthy diet, stress reduction, adequate sleep and recommended age-appropriate screening studies.  Immunizations reviewed.

## 2025-05-09 NOTE — ASSESSMENT & PLAN NOTE
Episodic potential fast heart rate or extra beats. Proceed with potassium, TSH abd magnesium and see Cardiology

## 2025-05-09 NOTE — PROGRESS NOTES
Chief Complaint   Patient presents with    Annual Exam     Not fasting       History of Present Illness  50 y.o. male presents for wellness exam. He has had EKG is mildly worse     Review of Systems   Constitutional:  Negative for chills and fever.   Respiratory:  Negative for cough and shortness of breath.    Cardiovascular:  Negative for chest pain and palpitations.   Gastrointestinal:  Positive for constipation. Negative for abdominal pain, nausea and vomiting.   Skin:  Negative for rash.   Neurological:  Negative for dizziness, light-headedness and headaches.   Psychiatric/Behavioral:  Negative for decreased concentration and dysphoric mood. The patient is not nervous/anxious.    All other systems reviewed and are negative.    .    PMSFH:  The following portions of the patient's history were reviewed and updated as appropriate: allergies, current medications, past family history, past medical history, past social history, past surgical history and problem list.      Current Outpatient Medications:     Cholecalciferol (Vitamin D3) 50 MCG (2000 UT) capsule, Take 1 capsule by mouth Daily., Disp: 30 capsule, Rfl: 11    Cyanocobalamin (B-12) 1000 MCG tablet, Take  by mouth. Once daily, Disp: , Rfl:     Farxiga 10 MG tablet, Take 1 tablet by mouth once daily, Disp: 90 tablet, Rfl: 0    fluticasone (FLONASE) 50 MCG/ACT nasal spray, Administer 2 sprays into the nostril(s) as directed by provider Daily., Disp: 16 g, Rfl: 5    lisinopril (PRINIVIL,ZESTRIL) 20 MG tablet, Take 1 tablet by mouth once daily, Disp: 90 tablet, Rfl: 0    omeprazole (priLOSEC) 20 MG capsule, Take 1 capsule by mouth once daily, Disp: 90 capsule, Rfl: 0    potassium chloride (KLOR-CON M10) 10 MEQ CR tablet, Take 1 tablet by mouth twice daily, Disp: 180 tablet, Rfl: 0    Potassium Citrate ER (Urocit-K 15) 15 MEQ (1620 MG) tablet controlled-release, Take 15 mEq by mouth Every 12 (Twelve) Hours., Disp: 180 tablet, Rfl: 1    Psyllium (Konsyl-D) 52.3 %  "powder, Take with breakfast and supper, Disp: , Rfl:     rosuvastatin (Crestor) 10 MG tablet, Take 1 tablet by mouth Daily., Disp: 90 tablet, Rfl: 1    tamsulosin (FLOMAX) 0.4 MG capsule 24 hr capsule, Take 1 capsule by mouth once daily, Disp: 90 capsule, Rfl: 0    venlafaxine XR (EFFEXOR-XR) 75 MG 24 hr capsule, TAKE 1 CAPSULE BY MOUTH ONCE DAILY WITH FOOD, Disp: 90 capsule, Rfl: 0    Wegovy 2.4 MG/0.75ML solution auto-injector, Inject 2.5 mg under the skin into the appropriate area as directed 1 (One) Time Per Week., Disp: , Rfl:     VITALS:  /80   Pulse 92   Temp 97.7 °F (36.5 °C)   Ht 175.3 cm (69.02\")   Wt 119 kg (262 lb)   BMI 38.67 kg/m²     Physical Exam  Vitals and nursing note reviewed.   Constitutional:       Appearance: Normal appearance. He is well-developed.   HENT:      Head: Normocephalic.      Right Ear: Tympanic membrane and ear canal normal.      Left Ear: Tympanic membrane and ear canal normal.   Eyes:      Extraocular Movements: Extraocular movements intact.      Conjunctiva/sclera: Conjunctivae normal.   Neck:      Vascular: No carotid bruit.   Cardiovascular:      Rate and Rhythm: Normal rate and regular rhythm.   Pulmonary:      Effort: Pulmonary effort is normal. No respiratory distress.      Breath sounds: Normal breath sounds.   Abdominal:      General: Bowel sounds are normal.      Palpations: Abdomen is soft.      Tenderness: There is no abdominal tenderness.      Comments: Obese    Genitourinary:     Prostate: Normal.   Musculoskeletal:         General: No swelling.   Skin:     General: Skin is warm and dry.   Neurological:      General: No focal deficit present.      Mental Status: He is alert and oriented to person, place, and time.   Psychiatric:         Mood and Affect: Mood normal.         Behavior: Behavior normal.         LABS:    CMP:  Lab Results   Component Value Date    BUN 7 11/01/2024    CREATININE 0.90 11/01/2024    EGFRIFNONA 90 02/25/2022     11/01/2024 "    K 3.8 11/01/2024     11/01/2024    CALCIUM 9.2 11/01/2024    ALBUMIN 4.0 11/01/2024    BILITOT 0.7 11/01/2024    ALKPHOS 77 11/01/2024    AST 22 11/01/2024    ALT 25 11/01/2024     CBC:  Lab Results   Component Value Date    WBC 9.78 11/01/2024    RBC 5.25 11/01/2024    HGB 14.5 11/01/2024    HCT 43.1 11/01/2024    MCV 82.1 11/01/2024    MCH 27.6 11/01/2024    MCHC 33.6 11/01/2024    RDW 14.2 11/01/2024     11/01/2024     LIPID PANEL:  Lab Results   Component Value Date    TRIG 98 05/01/2024    HDL 41 05/01/2024    VLDL 18 05/01/2024    LDL 93 05/01/2024    LDLHDL 2.23 05/01/2024     TSH:  Lab Results   Component Value Date    TSH 1.910 11/01/2024     HGBA1C (LAST 3):  Lab Results   Component Value Date    HGBA1C 5.60 11/01/2024    HGBA1C 5.80 (H) 05/01/2024    HGBA1C 5.70 (H) 10/11/2023     MICROALBUMIN SPOT URINE:  Lab Results   Component Value Date    MICROALBUR <1.2 11/01/2024       ECG 12 Lead    Date/Time: 5/9/2025 1:12 PM  Performed by: Austyn Carl MD    Authorized by: Austyn Carl MD  Comparison: compared with previous ECG from 5/20/2024  Comparison to previous ECG: Mildly worse   Rhythm: sinus rhythm  Rate: normal  Conduction: 1st degree AV block  Comments: Lead III with Q wave and non specific ST wave abnormality                ASSESSMENT/PLAN:  Diagnoses and all orders for this visit:    1. Preventative health care (Primary)  Assessment & Plan:  His mood is good overall and proceed with labs Get eye exam next week. Age-appropriate Counseling:  Discussed preventative medicine issues with patient including regular exercise, healthy diet, stress reduction, adequate sleep and recommended age-appropriate screening studies.  Immunizations reviewed.          2. Type 2 diabetes mellitus with morbid obesity  Assessment & Plan:  Patient's (Body mass index is 38.67 kg/m².) indicates that they are morbidly/severely obese (BMI > 40 or > 35 with obesity - related health condition) with  health conditions that include hypertension, diabetes mellitus, dyslipidemias, GERD, and osteoarthritis . Weight is improving with treatment. BMI  is above average; BMI management plan is completed. We discussed portion control and increasing exercise.     Orders:  -     Hemoglobin A1c; Future  -     Microalbumin / Creatinine Urine Ratio - Urine, Clean Catch; Future    3. Hyperlipidemia LDL goal <70  Assessment & Plan:   Lipid abnormalities are stable    Plan:  Continue same medication/s without change.      Discussed medication dosage, use, side effects, and goals of treatment in detail.    Counseled patient on lifestyle modifications to help control hyperlipidemia.     Patient Treatment Goals:   LDL goal is less than 70    Followup in 3 months.    Orders:  -     CBC & Differential; Future  -     Comprehensive Metabolic Panel; Future  -     TSH Rfx On Abnormal To Free T4; Future    4. Morbid obesity due to excess calories  Assessment & Plan:  Patient's (Body mass index is 38.67 kg/m².) indicates that they are morbidly/severely obese (BMI > 40 or > 35 with obesity - related health condition) with health conditions that include hypertension, diabetes mellitus, dyslipidemias, GERD, and osteoarthritis . Weight is improving with treatment. BMI  is above average; BMI management plan is completed. We discussed portion control and increasing exercise.       5. Vitamin D deficiency  Assessment & Plan:  Follow labs     Orders:  -     Vitamin D,25-Hydroxy; Future    6. Depressive disorder  Assessment & Plan:  Patient's depression is a recurrent episode that is mild without psychosis. Depression is in partial remission and stable.    Plan:   Continue current medication therapy     Followup in 3 months.       7. Screening PSA (prostate specific antigen)  -     PSA Screen; Future    8. Hypertension, benign  Assessment & Plan:  Hypertension is stable and controlled  Continue current treatment regimen.  Dietary sodium  restriction.  Weight loss.  Blood pressure will be reassessed in 3 months.    Orders:  -     Lipid Panel; Future  -     Ambulatory Referral to Cardiology    9. Tachycardia, paroxysmal  Assessment & Plan:  Episodic potential fast heart rate or extra beats. Proceed with potassium, TSH abd magnesium and see Cardiology     Orders:  -     Magnesium; Future  -     Ambulatory Referral to Cardiology    10. Abnormal EKG  Assessment & Plan:  Acute issue of Q wave  lead III and and low voltage and 1 degree AV block.     Orders:  -     Ambulatory Referral to Cardiology    Other orders  -     ECG 12 Lead        FOLLOW-UP:  Return in about 4 months (around 9/9/2025) for Recheck, Labs this visit.      Electronically signed by:    Austyn Carl MD

## 2025-05-13 ENCOUNTER — LAB (OUTPATIENT)
Dept: LAB | Facility: HOSPITAL | Age: 50
End: 2025-05-13
Payer: COMMERCIAL

## 2025-05-13 DIAGNOSIS — Z12.5 SCREENING PSA (PROSTATE SPECIFIC ANTIGEN): ICD-10-CM

## 2025-05-13 DIAGNOSIS — E11.69 TYPE 2 DIABETES MELLITUS WITH MORBID OBESITY: ICD-10-CM

## 2025-05-13 DIAGNOSIS — E66.01 TYPE 2 DIABETES MELLITUS WITH MORBID OBESITY: ICD-10-CM

## 2025-05-13 DIAGNOSIS — E78.5 HYPERLIPIDEMIA LDL GOAL <70: ICD-10-CM

## 2025-05-13 DIAGNOSIS — I10 HYPERTENSION, BENIGN: ICD-10-CM

## 2025-05-13 DIAGNOSIS — E55.9 VITAMIN D DEFICIENCY: ICD-10-CM

## 2025-05-13 DIAGNOSIS — I47.9 TACHYCARDIA, PAROXYSMAL: ICD-10-CM

## 2025-05-13 LAB
25(OH)D3 SERPL-MCNC: 26.6 NG/ML (ref 30–100)
ALBUMIN SERPL-MCNC: 4.1 G/DL (ref 3.5–5.2)
ALBUMIN UR-MCNC: 1.6 MG/DL
ALBUMIN/GLOB SERPL: 1.2 G/DL
ALP SERPL-CCNC: 77 U/L (ref 39–117)
ALT SERPL W P-5'-P-CCNC: 19 U/L (ref 1–41)
ANION GAP SERPL CALCULATED.3IONS-SCNC: 8.8 MMOL/L (ref 5–15)
AST SERPL-CCNC: 20 U/L (ref 1–40)
BASOPHILS # BLD AUTO: 0.09 10*3/MM3 (ref 0–0.2)
BASOPHILS NFR BLD AUTO: 0.7 % (ref 0–1.5)
BILIRUB SERPL-MCNC: 0.5 MG/DL (ref 0–1.2)
BUN SERPL-MCNC: 8 MG/DL (ref 6–20)
BUN/CREAT SERPL: 8.1 (ref 7–25)
CALCIUM SPEC-SCNC: 9.6 MG/DL (ref 8.6–10.5)
CHLORIDE SERPL-SCNC: 101 MMOL/L (ref 98–107)
CHOLEST SERPL-MCNC: 138 MG/DL (ref 0–200)
CO2 SERPL-SCNC: 26.2 MMOL/L (ref 22–29)
CREAT SERPL-MCNC: 0.99 MG/DL (ref 0.76–1.27)
CREAT UR-MCNC: 340.3 MG/DL
CRP SERPL-MCNC: 0.21 MG/DL (ref 0.01–0.5)
DEPRECATED RDW RBC AUTO: 43.4 FL (ref 37–54)
EGFRCR SERPLBLD CKD-EPI 2021: 91.7 ML/MIN/1.73
EOSINOPHIL # BLD AUTO: 1.12 10*3/MM3 (ref 0–0.4)
EOSINOPHIL NFR BLD AUTO: 9.2 % (ref 0.3–6.2)
ERYTHROCYTE [DISTWIDTH] IN BLOOD BY AUTOMATED COUNT: 14.3 % (ref 12.3–15.4)
GLOBULIN UR ELPH-MCNC: 3.5 GM/DL
GLUCOSE SERPL-MCNC: 87 MG/DL (ref 65–99)
HBA1C MFR BLD: 5.6 % (ref 4.8–5.6)
HCT VFR BLD AUTO: 43.5 % (ref 37.5–51)
HDLC SERPL-MCNC: 35 MG/DL (ref 40–60)
HGB BLD-MCNC: 14.3 G/DL (ref 13–17.7)
IMM GRANULOCYTES # BLD AUTO: 0.03 10*3/MM3 (ref 0–0.05)
IMM GRANULOCYTES NFR BLD AUTO: 0.2 % (ref 0–0.5)
LDLC SERPL CALC-MCNC: 85 MG/DL (ref 0–100)
LDLC/HDLC SERPL: 2.41 {RATIO}
LYMPHOCYTES # BLD AUTO: 4.38 10*3/MM3 (ref 0.7–3.1)
LYMPHOCYTES NFR BLD AUTO: 35.9 % (ref 19.6–45.3)
MAGNESIUM SERPL-MCNC: 2.2 MG/DL (ref 1.6–2.6)
MCH RBC QN AUTO: 28 PG (ref 26.6–33)
MCHC RBC AUTO-ENTMCNC: 32.9 G/DL (ref 31.5–35.7)
MCV RBC AUTO: 85.3 FL (ref 79–97)
MICROALBUMIN/CREAT UR: 4.7 MG/G (ref 0–29)
MONOCYTES # BLD AUTO: 0.79 10*3/MM3 (ref 0.1–0.9)
MONOCYTES NFR BLD AUTO: 6.5 % (ref 5–12)
NEUTROPHILS NFR BLD AUTO: 47.5 % (ref 42.7–76)
NEUTROPHILS NFR BLD AUTO: 5.8 10*3/MM3 (ref 1.7–7)
NRBC BLD AUTO-RTO: 0 /100 WBC (ref 0–0.2)
PLATELET # BLD AUTO: 374 10*3/MM3 (ref 140–450)
PMV BLD AUTO: 9 FL (ref 6–12)
POTASSIUM SERPL-SCNC: 4.1 MMOL/L (ref 3.5–5.2)
PROT SERPL-MCNC: 7.6 G/DL (ref 6–8.5)
PSA SERPL-MCNC: 0.67 NG/ML (ref 0–4)
RBC # BLD AUTO: 5.1 10*6/MM3 (ref 4.14–5.8)
SODIUM SERPL-SCNC: 136 MMOL/L (ref 136–145)
TRIGL SERPL-MCNC: 93 MG/DL (ref 0–150)
TSH SERPL DL<=0.05 MIU/L-ACNC: 2.74 UIU/ML (ref 0.27–4.2)
VLDLC SERPL-MCNC: 18 MG/DL (ref 5–40)
WBC NRBC COR # BLD AUTO: 12.21 10*3/MM3 (ref 3.4–10.8)

## 2025-05-13 PROCEDURE — 80050 GENERAL HEALTH PANEL: CPT

## 2025-05-13 PROCEDURE — 36415 COLL VENOUS BLD VENIPUNCTURE: CPT

## 2025-05-13 PROCEDURE — 82570 ASSAY OF URINE CREATININE: CPT

## 2025-05-13 PROCEDURE — 82043 UR ALBUMIN QUANTITATIVE: CPT

## 2025-05-13 PROCEDURE — 80061 LIPID PANEL: CPT

## 2025-05-13 PROCEDURE — 82306 VITAMIN D 25 HYDROXY: CPT

## 2025-05-13 PROCEDURE — G0103 PSA SCREENING: HCPCS

## 2025-05-13 PROCEDURE — 86141 C-REACTIVE PROTEIN HS: CPT

## 2025-05-13 PROCEDURE — 83735 ASSAY OF MAGNESIUM: CPT

## 2025-05-13 PROCEDURE — 83036 HEMOGLOBIN GLYCOSYLATED A1C: CPT

## 2025-05-16 ENCOUNTER — OFFICE VISIT (OUTPATIENT)
Dept: CARDIOLOGY | Facility: CLINIC | Age: 50
End: 2025-05-16
Payer: COMMERCIAL

## 2025-05-16 VITALS
WEIGHT: 260.4 LBS | DIASTOLIC BLOOD PRESSURE: 80 MMHG | OXYGEN SATURATION: 97 % | HEART RATE: 99 BPM | BODY MASS INDEX: 38.57 KG/M2 | HEIGHT: 69 IN | SYSTOLIC BLOOD PRESSURE: 124 MMHG

## 2025-05-16 DIAGNOSIS — E78.5 HYPERLIPIDEMIA LDL GOAL <70: Primary | ICD-10-CM

## 2025-05-16 PROCEDURE — 99214 OFFICE O/P EST MOD 30 MIN: CPT | Performed by: INTERNAL MEDICINE

## 2025-05-16 RX ORDER — ROSUVASTATIN CALCIUM 20 MG/1
20 TABLET, COATED ORAL DAILY
Qty: 90 TABLET | Refills: 3 | Status: SHIPPED | OUTPATIENT
Start: 2025-05-16

## 2025-05-16 NOTE — PROGRESS NOTES
"Chief Complaint  Follow-up (3 month follow up)      Subjective   History of Present Illness    Problem List  -CAC 12  -HTN  -EKG normal    MR. Alfaor is a 49 year old man being seen for preop.  No CV complaints.  Pretty active as high school .  ROS negative except for the above.      Update 5/16/25  Discussed testing, no complaints.           Objective   Vital Signs:  Vitals:    05/16/25 1407   BP: 124/80   Pulse: 99   SpO2: 97%     Estimated body mass index is 38.44 kg/m² as calculated from the following:    Height as of this encounter: 175.3 cm (69.02\").    Weight as of this encounter: 118 kg (260 lb 6.4 oz).       Physical Exam  HENT:      Head: Normocephalic.   Eyes:      Extraocular Movements: Extraocular movements intact.   Cardiovascular:      Rate and Rhythm: Normal rate and regular rhythm.      Heart sounds: No murmur heard.     No gallop.   Pulmonary:      Breath sounds: Normal breath sounds.   Abdominal:      Palpations: Abdomen is soft.   Musculoskeletal:      Right lower leg: No edema.      Left lower leg: No edema.   Skin:     General: Skin is warm and dry.   Neurological:      General: No focal deficit present.      Mental Status: He is alert.   Psychiatric:         Mood and Affect: Mood normal.               Assessment   -CAC 12  -HTN  -EKG normal    Plan   -increase Crestor to 20 mg.  Blood work in 3 months.      Return in about 1 year (around 5/16/2026).  Sandeep Lea MD      "

## 2025-05-27 RX ORDER — OMEPRAZOLE 20 MG/1
20 CAPSULE, DELAYED RELEASE ORAL DAILY
Qty: 90 CAPSULE | Refills: 0 | Status: SHIPPED | OUTPATIENT
Start: 2025-05-27

## 2025-05-27 NOTE — TELEPHONE ENCOUNTER
Rx Refill Note  Requested Prescriptions     Pending Prescriptions Disp Refills    omeprazole (priLOSEC) 20 MG capsule [Pharmacy Med Name: Omeprazole 20 MG Oral Capsule Delayed Release] 90 capsule 0     Sig: Take 1 capsule by mouth once daily      Last office visit with prescribing clinician: 5/9/2025   Last telemedicine visit with prescribing clinician: Visit date not found   Next office visit with prescribing clinician: 9/9/2025                         Would you like a call back once the refill request has been completed: [] Yes [] No    If the office needs to give you a call back, can they leave a voicemail: [] Yes [] No    Jerri Wood MA  05/27/25, 09:05 EDT

## 2025-06-06 DIAGNOSIS — E11.69 TYPE 2 DIABETES MELLITUS WITH OTHER SPECIFIED COMPLICATION: ICD-10-CM

## 2025-06-06 RX ORDER — SEMAGLUTIDE 2.4 MG/.75ML
2.4 INJECTION, SOLUTION SUBCUTANEOUS WEEKLY
Qty: 3 ML | Refills: 3 | Status: SHIPPED | OUTPATIENT
Start: 2025-06-06

## 2025-06-09 RX ORDER — VENLAFAXINE HYDROCHLORIDE 75 MG/1
75 CAPSULE, EXTENDED RELEASE ORAL DAILY
Qty: 90 CAPSULE | Refills: 0 | Status: SHIPPED | OUTPATIENT
Start: 2025-06-09

## 2025-06-09 RX ORDER — LISINOPRIL 20 MG/1
20 TABLET ORAL DAILY
Qty: 90 TABLET | Refills: 0 | Status: SHIPPED | OUTPATIENT
Start: 2025-06-09

## 2025-06-10 DIAGNOSIS — E66.01 TYPE 2 DIABETES MELLITUS WITH MORBID OBESITY: ICD-10-CM

## 2025-06-10 DIAGNOSIS — E11.69 TYPE 2 DIABETES MELLITUS WITH MORBID OBESITY: ICD-10-CM

## 2025-06-10 RX ORDER — DAPAGLIFLOZIN 10 MG/1
1 TABLET, FILM COATED ORAL DAILY
Qty: 90 TABLET | Refills: 1 | Status: SHIPPED | OUTPATIENT
Start: 2025-06-10

## 2025-06-10 NOTE — TELEPHONE ENCOUNTER
Rx Refill Note  Requested Prescriptions     Pending Prescriptions Disp Refills    dapagliflozin Propanediol 10 MG tablet [Pharmacy Med Name: Dapagliflozin Propanediol 10 MG Oral Tablet] 90 tablet 0     Sig: Take 1 tablet by mouth once daily      Last office visit with prescribing clinician: 5/9/2025   Last telemedicine visit with prescribing clinician: Visit date not found   Next office visit with prescribing clinician: 9/9/2025                         Would you like a call back once the refill request has been completed: [] Yes [] No    If the office needs to give you a call back, can they leave a voicemail: [] Yes [] No    Ellie Hernandez MA  06/10/25, 10:06 EDT

## 2025-06-23 ENCOUNTER — PATIENT MESSAGE (OUTPATIENT)
Dept: INTERNAL MEDICINE | Facility: CLINIC | Age: 50
End: 2025-06-23
Payer: COMMERCIAL

## 2025-06-23 DIAGNOSIS — E66.01 TYPE 2 DIABETES MELLITUS WITH MORBID OBESITY: ICD-10-CM

## 2025-06-23 DIAGNOSIS — E11.69 TYPE 2 DIABETES MELLITUS WITH MORBID OBESITY: ICD-10-CM

## 2025-06-24 NOTE — TELEPHONE ENCOUNTER
Hi doc, patient is requesting a PA for this medication but I don't see an updated script in his chart, please advise

## 2025-07-03 ENCOUNTER — TELEPHONE (OUTPATIENT)
Dept: INTERNAL MEDICINE | Facility: CLINIC | Age: 50
End: 2025-07-03
Payer: COMMERCIAL

## 2025-07-13 RX ORDER — DAPAGLIFLOZIN 10 MG/1
1 TABLET, FILM COATED ORAL DAILY
Qty: 90 TABLET | Refills: 1 | Status: SHIPPED | OUTPATIENT
Start: 2025-07-13

## 2025-07-24 ENCOUNTER — HOSPITAL ENCOUNTER (OUTPATIENT)
Dept: CARDIOLOGY | Facility: HOSPITAL | Age: 50
Discharge: HOME | End: 2025-07-24
Admitting: INTERNAL MEDICINE
Payer: COMMERCIAL

## 2025-07-24 VITALS
WEIGHT: 260.14 LBS | SYSTOLIC BLOOD PRESSURE: 143 MMHG | BODY MASS INDEX: 38.53 KG/M2 | HEIGHT: 69 IN | DIASTOLIC BLOOD PRESSURE: 90 MMHG

## 2025-07-24 DIAGNOSIS — E78.5 HYPERLIPIDEMIA LDL GOAL <70: ICD-10-CM

## 2025-07-24 LAB
AORTIC DIMENSIONLESS INDEX: 0.96 (DI)
ASCENDING AORTA: 3.4 CM
AV MEAN PRESS GRAD SYS DOP V1V2: 2.7 MMHG
AV VMAX SYS DOP: 111 CM/SEC
BH CV ECHO LEFT VENTRICLE GLOBAL LONGITUDINAL STRAIN: -19.8 %
BH CV ECHO MEAS - 2D AUTO EF SIEMENS: 61.7 %
BH CV ECHO MEAS - AO MAX PG: 4.9 MMHG
BH CV ECHO MEAS - AO ROOT DIAM: 3.8 CM
BH CV ECHO MEAS - AO V2 VTI: 20.8 CM
BH CV ECHO MEAS - AVA(I,D): 4.3 CM2
BH CV ECHO MEAS - IVS/LVPW: 1 CM
BH CV ECHO MEAS - IVSD: 0.7 CM
BH CV ECHO MEAS - LA DIMENSION: 3.9 CM
BH CV ECHO MEAS - LAT PEAK E' VEL: 11.8 CM/SEC
BH CV ECHO MEAS - LV MAX PG: 3.8 MMHG
BH CV ECHO MEAS - LV MEAN PG: 1.9 MMHG
BH CV ECHO MEAS - LV V1 MAX: 97.1 CM/SEC
BH CV ECHO MEAS - LV V1 VTI: 19.9 CM
BH CV ECHO MEAS - LVIDD: 4.3 CM
BH CV ECHO MEAS - LVIDS: 2.9 CM
BH CV ECHO MEAS - LVOT AREA: 4.5 CM2
BH CV ECHO MEAS - LVOT DIAM: 2.4 CM
BH CV ECHO MEAS - LVPWD: 0.7 CM
BH CV ECHO MEAS - MED PEAK E' VEL: 10.7 CM/SEC
BH CV ECHO MEAS - MV DEC SLOPE: 347 CM/SEC2
BH CV ECHO MEAS - MV MAX PG: 1.99 MMHG
BH CV ECHO MEAS - MV MEAN PG: 1.3 MMHG
BH CV ECHO MEAS - MV P1/2T: 59 MSEC
BH CV ECHO MEAS - MV V2 VTI: 16.7 CM
BH CV ECHO MEAS - MVA(P1/2T): 3.7 CM2
BH CV ECHO MEAS - MVA(VTI): 5.4 CM2
BH CV ECHO MEAS - PA ACC TIME: 0.14 SEC
BH CV ECHO MEAS - PA V2 MAX: 106.1 CM/SEC
BH CV ECHO MEAS - SV(LVOT): 90 ML
BH CV ECHO MEAS - SVI(LVOT): 39 ML/M2
BH CV ECHO MEAS - TAPSE (>1.6): 1.87 CM
BH CV VAS BP RIGHT ARM: NORMAL MMHG
BH CV XLRA - RV BASE: 3.3 CM
BH CV XLRA - RV LENGTH: 7.4 CM
BH CV XLRA - RV MID: 2.5 CM
BH CV XLRA - TDI S': 11.2 CM/SEC
IVRT: 89 MS
LEFT ATRIUM VOLUME INDEX: 13.7 ML/M2

## 2025-07-24 PROCEDURE — 93306 TTE W/DOPPLER COMPLETE: CPT

## 2025-07-24 PROCEDURE — 25010000002 SULFUR HEXAFLUORIDE MICROSPH 60.7-25 MG RECONSTITUTED SUSPENSION: Performed by: INTERNAL MEDICINE

## 2025-07-24 PROCEDURE — 93356 MYOCRD STRAIN IMG SPCKL TRCK: CPT

## 2025-07-24 RX ADMIN — SULFUR HEXAFLUORIDE 5 ML: KIT at 12:04

## 2025-07-25 ENCOUNTER — RESULTS FOLLOW-UP (OUTPATIENT)
Dept: CARDIOLOGY | Facility: CLINIC | Age: 50
End: 2025-07-25
Payer: COMMERCIAL

## 2025-07-25 NOTE — PROGRESS NOTES
Ejection fraction normal.  We may consider repeating at some point given strain pattern in non specific pattern but overall normal.

## 2025-08-12 RX ORDER — POTASSIUM CHLORIDE 750 MG/1
TABLET, EXTENDED RELEASE ORAL 2 TIMES DAILY
Qty: 180 TABLET | Refills: 0 | Status: SHIPPED | OUTPATIENT
Start: 2025-08-12

## 2025-08-20 RX ORDER — OMEPRAZOLE 20 MG/1
20 CAPSULE, DELAYED RELEASE ORAL DAILY
Qty: 90 CAPSULE | Refills: 0 | Status: SHIPPED | OUTPATIENT
Start: 2025-08-20